# Patient Record
Sex: MALE | Race: BLACK OR AFRICAN AMERICAN | NOT HISPANIC OR LATINO
[De-identification: names, ages, dates, MRNs, and addresses within clinical notes are randomized per-mention and may not be internally consistent; named-entity substitution may affect disease eponyms.]

---

## 2017-09-05 ENCOUNTER — APPOINTMENT (OUTPATIENT)
Dept: CARDIOTHORACIC SURGERY | Facility: CLINIC | Age: 57
End: 2017-09-05

## 2017-09-05 DIAGNOSIS — J90 PLEURAL EFFUSION, NOT ELSEWHERE CLASSIFIED: ICD-10-CM

## 2017-09-19 ENCOUNTER — APPOINTMENT (OUTPATIENT)
Dept: CARDIOTHORACIC SURGERY | Facility: CLINIC | Age: 57
End: 2017-09-19

## 2017-09-19 ENCOUNTER — APPOINTMENT (OUTPATIENT)
Dept: CARDIOTHORACIC SURGERY | Facility: CLINIC | Age: 57
End: 2017-09-19
Payer: COMMERCIAL

## 2017-09-26 ENCOUNTER — APPOINTMENT (OUTPATIENT)
Dept: CARDIOTHORACIC SURGERY | Facility: CLINIC | Age: 57
End: 2017-09-26
Payer: COMMERCIAL

## 2017-10-02 ENCOUNTER — FORM ENCOUNTER (OUTPATIENT)
Age: 57
End: 2017-10-02

## 2017-10-03 ENCOUNTER — OUTPATIENT (OUTPATIENT)
Dept: OUTPATIENT SERVICES | Facility: HOSPITAL | Age: 57
LOS: 1 days | End: 2017-10-03
Payer: COMMERCIAL

## 2017-10-03 ENCOUNTER — APPOINTMENT (OUTPATIENT)
Dept: CARDIOTHORACIC SURGERY | Facility: CLINIC | Age: 57
End: 2017-10-03
Payer: COMMERCIAL

## 2017-10-03 VITALS
DIASTOLIC BLOOD PRESSURE: 84 MMHG | BODY MASS INDEX: 26.65 KG/M2 | RESPIRATION RATE: 19 BRPM | SYSTOLIC BLOOD PRESSURE: 147 MMHG | TEMPERATURE: 97.5 F | HEART RATE: 73 BPM | OXYGEN SATURATION: 99 % | WEIGHT: 170 LBS

## 2017-10-03 DIAGNOSIS — I34.9 NONRHEUMATIC MITRAL VALVE DISORDER, UNSPECIFIED: ICD-10-CM

## 2017-10-03 DIAGNOSIS — Z98.890 OTHER SPECIFIED POSTPROCEDURAL STATES: Chronic | ICD-10-CM

## 2017-10-03 PROCEDURE — 99214 OFFICE O/P EST MOD 30 MIN: CPT

## 2017-10-03 PROCEDURE — 93306 TTE W/DOPPLER COMPLETE: CPT | Mod: 26

## 2017-10-03 PROCEDURE — 93306 TTE W/DOPPLER COMPLETE: CPT

## 2017-10-03 RX ORDER — ATORVASTATIN CALCIUM 20 MG/1
20 TABLET, FILM COATED ORAL
Qty: 30 | Refills: 3 | Status: ACTIVE | COMMUNITY

## 2017-10-18 ENCOUNTER — NON-APPOINTMENT (OUTPATIENT)
Age: 57
End: 2017-10-18

## 2017-10-18 ENCOUNTER — APPOINTMENT (OUTPATIENT)
Dept: CARDIOLOGY | Facility: CLINIC | Age: 57
End: 2017-10-18
Payer: MEDICAID

## 2017-10-18 VITALS
OXYGEN SATURATION: 98 % | HEART RATE: 83 BPM | WEIGHT: 174 LBS | DIASTOLIC BLOOD PRESSURE: 60 MMHG | SYSTOLIC BLOOD PRESSURE: 120 MMHG | HEIGHT: 67 IN | BODY MASS INDEX: 27.31 KG/M2

## 2017-10-18 PROCEDURE — 93000 ELECTROCARDIOGRAM COMPLETE: CPT

## 2017-10-18 PROCEDURE — 99204 OFFICE O/P NEW MOD 45 MIN: CPT

## 2017-11-02 ENCOUNTER — APPOINTMENT (OUTPATIENT)
Dept: CARDIOLOGY | Facility: CLINIC | Age: 57
End: 2017-11-02
Payer: MEDICAID

## 2017-11-02 PROCEDURE — 93015 CV STRESS TEST SUPVJ I&R: CPT

## 2017-11-06 ENCOUNTER — APPOINTMENT (OUTPATIENT)
Dept: CARDIOLOGY | Facility: CLINIC | Age: 57
End: 2017-11-06
Payer: MEDICAID

## 2017-11-06 PROCEDURE — 93224 XTRNL ECG REC UP TO 48 HRS: CPT

## 2017-11-08 ENCOUNTER — NON-APPOINTMENT (OUTPATIENT)
Age: 57
End: 2017-11-08

## 2017-11-21 ENCOUNTER — APPOINTMENT (OUTPATIENT)
Dept: CARDIOLOGY | Facility: CLINIC | Age: 57
End: 2017-11-21
Payer: MEDICAID

## 2017-11-21 ENCOUNTER — NON-APPOINTMENT (OUTPATIENT)
Age: 57
End: 2017-11-21

## 2017-11-21 VITALS
BODY MASS INDEX: 26.84 KG/M2 | WEIGHT: 171 LBS | SYSTOLIC BLOOD PRESSURE: 120 MMHG | HEIGHT: 67 IN | DIASTOLIC BLOOD PRESSURE: 70 MMHG | HEART RATE: 79 BPM | OXYGEN SATURATION: 98 %

## 2017-11-21 PROCEDURE — 93000 ELECTROCARDIOGRAM COMPLETE: CPT

## 2017-11-21 PROCEDURE — 99214 OFFICE O/P EST MOD 30 MIN: CPT

## 2017-12-04 ENCOUNTER — INPATIENT (INPATIENT)
Facility: HOSPITAL | Age: 57
LOS: 0 days | Discharge: ANOTHER HOSPITAL | End: 2017-12-05
Attending: HOSPITALIST | Admitting: HOSPITALIST
Payer: COMMERCIAL

## 2017-12-04 VITALS
DIASTOLIC BLOOD PRESSURE: 91 MMHG | RESPIRATION RATE: 20 BRPM | HEART RATE: 118 BPM | HEIGHT: 67 IN | SYSTOLIC BLOOD PRESSURE: 109 MMHG | TEMPERATURE: 98 F | WEIGHT: 171.08 LBS | OXYGEN SATURATION: 99 %

## 2017-12-04 DIAGNOSIS — Z98.890 OTHER SPECIFIED POSTPROCEDURAL STATES: Chronic | ICD-10-CM

## 2017-12-04 LAB
BASOPHILS # BLD AUTO: 0.1 K/UL — SIGNIFICANT CHANGE UP (ref 0–0.2)
BASOPHILS NFR BLD AUTO: 1.8 % — SIGNIFICANT CHANGE UP (ref 0–2)
D DIMER BLD IA.RAPID-MCNC: 164 NG/ML DDU — SIGNIFICANT CHANGE UP
EOSINOPHIL # BLD AUTO: 0.2 K/UL — SIGNIFICANT CHANGE UP (ref 0–0.5)
EOSINOPHIL NFR BLD AUTO: 3.1 % — SIGNIFICANT CHANGE UP (ref 0–6)
HCT VFR BLD CALC: 40.6 % — SIGNIFICANT CHANGE UP (ref 39–50)
HGB BLD-MCNC: 13.3 G/DL — SIGNIFICANT CHANGE UP (ref 13–17)
LACTATE SERPL-SCNC: 0.7 MMOL/L — SIGNIFICANT CHANGE UP (ref 0.7–2)
LYMPHOCYTES # BLD AUTO: 2.5 K/UL — SIGNIFICANT CHANGE UP (ref 1–3.3)
LYMPHOCYTES # BLD AUTO: 45.8 % — HIGH (ref 13–44)
MCHC RBC-ENTMCNC: 27.8 PG — SIGNIFICANT CHANGE UP (ref 27–34)
MCHC RBC-ENTMCNC: 32.8 GM/DL — SIGNIFICANT CHANGE UP (ref 32–36)
MCV RBC AUTO: 84.9 FL — SIGNIFICANT CHANGE UP (ref 80–100)
MONOCYTES # BLD AUTO: 0.5 K/UL — SIGNIFICANT CHANGE UP (ref 0–0.9)
MONOCYTES NFR BLD AUTO: 9.7 % — SIGNIFICANT CHANGE UP (ref 2–14)
NEUTROPHILS # BLD AUTO: 2.1 K/UL — SIGNIFICANT CHANGE UP (ref 1.8–7.4)
NEUTROPHILS NFR BLD AUTO: 39.6 % — LOW (ref 43–77)
PLATELET # BLD AUTO: 201 K/UL — SIGNIFICANT CHANGE UP (ref 150–400)
RBC # BLD: 4.78 M/UL — SIGNIFICANT CHANGE UP (ref 4.2–5.8)
RBC # FLD: 13.2 % — SIGNIFICANT CHANGE UP (ref 11–15)
WBC # BLD: 5.4 K/UL — SIGNIFICANT CHANGE UP (ref 3.8–10.5)
WBC # FLD AUTO: 5.4 K/UL — SIGNIFICANT CHANGE UP (ref 3.8–10.5)

## 2017-12-04 PROCEDURE — 99285 EMERGENCY DEPT VISIT HI MDM: CPT | Mod: 25

## 2017-12-04 PROCEDURE — 71010: CPT | Mod: 26

## 2017-12-04 NOTE — ED ADULT NURSE NOTE - NSHISCREENINGQ1_ED_A_ED
Graft Donor Site Bandage (Optional-Leave Blank If You Don't Want In Note): Steri-strips and a pressure bandage were applied to the donor site. No

## 2017-12-04 NOTE — ED PROVIDER NOTE - OBJECTIVE STATEMENT
Pertinent PMH/PSH/FHx/SHx and Review of Systems contained within:  57M hx htn, valve repair mitral and tricuspid last year pw tachycardia and palpitations. this afternoon without exertion pt developed tachycardia. no cp, sob, nausea, vomiting. took an extra extended release metop 50. doesn't feel symptoms now  Fh and Sh not otherwise contributory  ROS otherwise negative

## 2017-12-04 NOTE — ED PROVIDER NOTE - PHYSICAL EXAMINATION
Gen: Alert, NAD  Head: NC, AT   Eyes: PERRL, EOMI, normal lids/conjunctiva  ENT: normal hearing, patent oropharynx without erythema/exudate, uvula midline  Neck: supple, no tenderness, Trachea midline  Pulm: Bilateral BS, normal resp effort, no wheeze/stridor/retractions  CV: tachycardia, no M/R/G, 2+ radial and dp pulses bl, no edema  Abd: soft, NT/ND, +BS, no hepatosplenomegaly  Mskel: extremities x4 with normal ROM and no joint effusions. no ctl spine ttp.   Skin: no rash, no bruising   Neuro: AAOx3, no sensory/motor deficits, CN 2-12 intact

## 2017-12-04 NOTE — ED PROVIDER NOTE - MEDICAL DECISION MAKING DETAILS
patient pw palpitations. patient pw palpitations. ekg shows sinus tach with twave inversions laterally. no abnormal intervals. suspect pe vs valve damage vs acs. will likely need admission for echo. patient pw palpitations. ekg shows sinus tach with twave inversions laterally. no abnormal intervals. suspect pe vs valve damage vs acs. will likely need admission for echo. trop elevated slightly, suspect type 2 event. will give asa and iv metop. will admit. consult blanco in the AM

## 2017-12-04 NOTE — ED ADULT NURSE NOTE - OBJECTIVE STATEMENT
Pt c/o feeling palpitations earlier today. Hx HTN, Cardiac (mitral & tricuspid) valve repair last year. Pt denies SOB, N/V, dizziness. Denies heavy lifting. Denies feeling palpitations at this time. -120 at rest. No distress noted.

## 2017-12-04 NOTE — ED PROVIDER NOTE - PMH
Atrial fibrillation    BPH (benign prostatic hyperplasia)    Hernia, femoral    Mitral insufficiency    Systolic CHF, acute on chronic

## 2017-12-05 ENCOUNTER — INPATIENT (INPATIENT)
Facility: HOSPITAL | Age: 57
LOS: 1 days | Discharge: HOME | DRG: 274 | End: 2017-12-07
Attending: INTERNAL MEDICINE | Admitting: INTERNAL MEDICINE
Payer: COMMERCIAL

## 2017-12-05 VITALS
TEMPERATURE: 98 F | HEART RATE: 105 BPM | OXYGEN SATURATION: 98 % | DIASTOLIC BLOOD PRESSURE: 69 MMHG | SYSTOLIC BLOOD PRESSURE: 111 MMHG | HEIGHT: 67 IN | WEIGHT: 167.55 LBS | RESPIRATION RATE: 18 BRPM

## 2017-12-05 VITALS
DIASTOLIC BLOOD PRESSURE: 85 MMHG | HEART RATE: 120 BPM | TEMPERATURE: 98 F | OXYGEN SATURATION: 96 % | RESPIRATION RATE: 18 BRPM | SYSTOLIC BLOOD PRESSURE: 126 MMHG

## 2017-12-05 DIAGNOSIS — Z98.890 OTHER SPECIFIED POSTPROCEDURAL STATES: Chronic | ICD-10-CM

## 2017-12-05 DIAGNOSIS — I49.9 CARDIAC ARRHYTHMIA, UNSPECIFIED: ICD-10-CM

## 2017-12-05 DIAGNOSIS — I34.0 NONRHEUMATIC MITRAL (VALVE) INSUFFICIENCY: ICD-10-CM

## 2017-12-05 DIAGNOSIS — N40.1 BENIGN PROSTATIC HYPERPLASIA WITH LOWER URINARY TRACT SYMPTOMS: ICD-10-CM

## 2017-12-05 DIAGNOSIS — R00.2 PALPITATIONS: ICD-10-CM

## 2017-12-05 DIAGNOSIS — R74.8 ABNORMAL LEVELS OF OTHER SERUM ENZYMES: ICD-10-CM

## 2017-12-05 LAB
ALBUMIN SERPL ELPH-MCNC: 3.8 G/DL — SIGNIFICANT CHANGE UP (ref 3.3–5)
ALP SERPL-CCNC: 69 U/L — SIGNIFICANT CHANGE UP (ref 40–120)
ALT FLD-CCNC: 26 U/L — SIGNIFICANT CHANGE UP (ref 12–78)
ANION GAP SERPL CALC-SCNC: 7 MMOL/L — SIGNIFICANT CHANGE UP (ref 5–17)
ANION GAP SERPL CALC-SCNC: 9 MMOL/L — SIGNIFICANT CHANGE UP (ref 5–17)
APPEARANCE UR: CLEAR — SIGNIFICANT CHANGE UP
APTT BLD: 146.2 SEC — CRITICAL HIGH (ref 27.5–37.4)
APTT BLD: 33.3 SEC — SIGNIFICANT CHANGE UP (ref 27.5–37.4)
APTT BLD: 57.3 SEC — HIGH (ref 27.5–37.4)
APTT BLD: > 200 SEC (ref 27.5–37.4)
AST SERPL-CCNC: 21 U/L — SIGNIFICANT CHANGE UP (ref 15–37)
BACTERIA # UR AUTO: ABNORMAL
BASOPHILS # BLD AUTO: 0.1 K/UL — SIGNIFICANT CHANGE UP (ref 0–0.2)
BASOPHILS NFR BLD AUTO: 1.5 % — SIGNIFICANT CHANGE UP (ref 0–2)
BILIRUB SERPL-MCNC: 1 MG/DL — SIGNIFICANT CHANGE UP (ref 0.2–1.2)
BILIRUB UR-MCNC: NEGATIVE — SIGNIFICANT CHANGE UP
BUN SERPL-MCNC: 17 MG/DL — SIGNIFICANT CHANGE UP (ref 7–23)
BUN SERPL-MCNC: 18 MG/DL — SIGNIFICANT CHANGE UP (ref 7–23)
CALCIUM SERPL-MCNC: 8.3 MG/DL — LOW (ref 8.5–10.1)
CALCIUM SERPL-MCNC: 8.7 MG/DL — SIGNIFICANT CHANGE UP (ref 8.5–10.1)
CHLORIDE SERPL-SCNC: 106 MMOL/L — SIGNIFICANT CHANGE UP (ref 96–108)
CHLORIDE SERPL-SCNC: 107 MMOL/L — SIGNIFICANT CHANGE UP (ref 96–108)
CK MB CFR SERPL CALC: 3.4 NG/ML — SIGNIFICANT CHANGE UP (ref 0.5–3.6)
CO2 SERPL-SCNC: 27 MMOL/L — SIGNIFICANT CHANGE UP (ref 22–31)
CO2 SERPL-SCNC: 28 MMOL/L — SIGNIFICANT CHANGE UP (ref 22–31)
COLOR SPEC: YELLOW — SIGNIFICANT CHANGE UP
CREAT SERPL-MCNC: 1.14 MG/DL — SIGNIFICANT CHANGE UP (ref 0.5–1.3)
CREAT SERPL-MCNC: 1.2 MG/DL — SIGNIFICANT CHANGE UP (ref 0.5–1.3)
DIFF PNL FLD: ABNORMAL
EOSINOPHIL # BLD AUTO: 0.3 K/UL — SIGNIFICANT CHANGE UP (ref 0–0.5)
EOSINOPHIL NFR BLD AUTO: 4.6 % — SIGNIFICANT CHANGE UP (ref 0–6)
GLUCOSE SERPL-MCNC: 97 MG/DL — SIGNIFICANT CHANGE UP (ref 70–99)
GLUCOSE SERPL-MCNC: 99 MG/DL — SIGNIFICANT CHANGE UP (ref 70–99)
GLUCOSE UR QL: NEGATIVE MG/DL — SIGNIFICANT CHANGE UP
HCT VFR BLD CALC: 38 % — LOW (ref 39–50)
HCT VFR BLD CALC: 40 % — SIGNIFICANT CHANGE UP (ref 39–50)
HGB BLD-MCNC: 12.3 G/DL — LOW (ref 13–17)
HGB BLD-MCNC: 13.2 G/DL — SIGNIFICANT CHANGE UP (ref 13–17)
INR BLD: 1.06 RATIO — SIGNIFICANT CHANGE UP (ref 0.88–1.16)
INR BLD: 1.2 RATIO — HIGH (ref 0.88–1.16)
KETONES UR-MCNC: NEGATIVE — SIGNIFICANT CHANGE UP
LEUKOCYTE ESTERASE UR-ACNC: NEGATIVE — SIGNIFICANT CHANGE UP
LYMPHOCYTES # BLD AUTO: 3.8 K/UL — HIGH (ref 1–3.3)
LYMPHOCYTES # BLD AUTO: 58.2 % — HIGH (ref 13–44)
MAGNESIUM SERPL-MCNC: 2.4 MG/DL — SIGNIFICANT CHANGE UP (ref 1.6–2.6)
MCHC RBC-ENTMCNC: 27.8 PG — SIGNIFICANT CHANGE UP (ref 27–34)
MCHC RBC-ENTMCNC: 28.3 PG — SIGNIFICANT CHANGE UP (ref 27–34)
MCHC RBC-ENTMCNC: 32.3 GM/DL — SIGNIFICANT CHANGE UP (ref 32–36)
MCHC RBC-ENTMCNC: 32.9 GM/DL — SIGNIFICANT CHANGE UP (ref 32–36)
MCV RBC AUTO: 85.9 FL — SIGNIFICANT CHANGE UP (ref 80–100)
MCV RBC AUTO: 86 FL — SIGNIFICANT CHANGE UP (ref 80–100)
MONOCYTES # BLD AUTO: 0.4 K/UL — SIGNIFICANT CHANGE UP (ref 0–0.9)
MONOCYTES NFR BLD AUTO: 5.9 % — SIGNIFICANT CHANGE UP (ref 2–14)
NEUTROPHILS # BLD AUTO: 2 K/UL — SIGNIFICANT CHANGE UP (ref 1.8–7.4)
NEUTROPHILS NFR BLD AUTO: 29.8 % — LOW (ref 43–77)
NITRITE UR-MCNC: NEGATIVE — SIGNIFICANT CHANGE UP
PH UR: 7 — SIGNIFICANT CHANGE UP (ref 5–8)
PLATELET # BLD AUTO: 194 K/UL — SIGNIFICANT CHANGE UP (ref 150–400)
PLATELET # BLD AUTO: 194 K/UL — SIGNIFICANT CHANGE UP (ref 150–400)
POTASSIUM SERPL-MCNC: 3.6 MMOL/L — SIGNIFICANT CHANGE UP (ref 3.5–5.3)
POTASSIUM SERPL-MCNC: 4.3 MMOL/L — SIGNIFICANT CHANGE UP (ref 3.5–5.3)
POTASSIUM SERPL-SCNC: 3.6 MMOL/L — SIGNIFICANT CHANGE UP (ref 3.5–5.3)
POTASSIUM SERPL-SCNC: 4.3 MMOL/L — SIGNIFICANT CHANGE UP (ref 3.5–5.3)
PROT SERPL-MCNC: 7.9 GM/DL — SIGNIFICANT CHANGE UP (ref 6–8.3)
PROT UR-MCNC: NEGATIVE MG/DL — SIGNIFICANT CHANGE UP
PROTHROM AB SERPL-ACNC: 11.6 SEC — SIGNIFICANT CHANGE UP (ref 9.8–12.7)
PROTHROM AB SERPL-ACNC: 13 SEC — HIGH (ref 9.8–12.7)
RBC # BLD: 4.42 M/UL — SIGNIFICANT CHANGE UP (ref 4.2–5.8)
RBC # BLD: 4.65 M/UL — SIGNIFICANT CHANGE UP (ref 4.2–5.8)
RBC # FLD: 13.7 % — SIGNIFICANT CHANGE UP (ref 11–15)
RBC # FLD: 13.8 % — SIGNIFICANT CHANGE UP (ref 11–15)
RBC CASTS # UR COMP ASSIST: ABNORMAL /HPF (ref 0–4)
SODIUM SERPL-SCNC: 142 MMOL/L — SIGNIFICANT CHANGE UP (ref 135–145)
SODIUM SERPL-SCNC: 142 MMOL/L — SIGNIFICANT CHANGE UP (ref 135–145)
SP GR SPEC: 1.01 — SIGNIFICANT CHANGE UP (ref 1.01–1.02)
TROPONIN I SERPL-MCNC: 0.03 NG/ML — SIGNIFICANT CHANGE UP (ref 0.01–0.04)
TROPONIN I SERPL-MCNC: 0.06 NG/ML — HIGH (ref 0.01–0.04)
TROPONIN I SERPL-MCNC: 0.07 NG/ML — HIGH (ref 0.01–0.04)
UROBILINOGEN FLD QL: NEGATIVE MG/DL — SIGNIFICANT CHANGE UP
WBC # BLD: 5.8 K/UL — SIGNIFICANT CHANGE UP (ref 3.8–10.5)
WBC # BLD: 6.5 K/UL — SIGNIFICANT CHANGE UP (ref 3.8–10.5)
WBC # FLD AUTO: 5.8 K/UL — SIGNIFICANT CHANGE UP (ref 3.8–10.5)
WBC # FLD AUTO: 6.5 K/UL — SIGNIFICANT CHANGE UP (ref 3.8–10.5)
WBC UR QL: SIGNIFICANT CHANGE UP

## 2017-12-05 PROCEDURE — 99223 1ST HOSP IP/OBS HIGH 75: CPT

## 2017-12-05 PROCEDURE — 93613 INTRACARDIAC EPHYS 3D MAPG: CPT

## 2017-12-05 PROCEDURE — 93656 COMPRE EP EVAL ABLTJ ATR FIB: CPT

## 2017-12-05 PROCEDURE — 93010 ELECTROCARDIOGRAM REPORT: CPT | Mod: 76

## 2017-12-05 PROCEDURE — 93655 ICAR CATH ABLTJ DSCRT ARRHYT: CPT

## 2017-12-05 PROCEDURE — 93662 INTRACARDIAC ECG (ICE): CPT | Mod: 26

## 2017-12-05 PROCEDURE — 93010 ELECTROCARDIOGRAM REPORT: CPT

## 2017-12-05 PROCEDURE — 12345: CPT | Mod: NC

## 2017-12-05 RX ORDER — ASPIRIN/CALCIUM CARB/MAGNESIUM 324 MG
81 TABLET ORAL DAILY
Qty: 0 | Refills: 0 | Status: DISCONTINUED | OUTPATIENT
Start: 2017-12-05 | End: 2017-12-07

## 2017-12-05 RX ORDER — TAMSULOSIN HYDROCHLORIDE 0.4 MG/1
0.4 CAPSULE ORAL AT BEDTIME
Qty: 0 | Refills: 0 | Status: DISCONTINUED | OUTPATIENT
Start: 2017-12-05 | End: 2017-12-05

## 2017-12-05 RX ORDER — METOPROLOL TARTRATE 50 MG
25 TABLET ORAL
Qty: 0 | Refills: 0 | Status: DISCONTINUED | OUTPATIENT
Start: 2017-12-05 | End: 2017-12-05

## 2017-12-05 RX ORDER — ACETAMINOPHEN 500 MG
650 TABLET ORAL EVERY 6 HOURS
Qty: 0 | Refills: 0 | Status: DISCONTINUED | OUTPATIENT
Start: 2017-12-05 | End: 2017-12-07

## 2017-12-05 RX ORDER — ASPIRIN/CALCIUM CARB/MAGNESIUM 324 MG
81 TABLET ORAL DAILY
Qty: 0 | Refills: 0 | Status: DISCONTINUED | OUTPATIENT
Start: 2017-12-06 | End: 2017-12-05

## 2017-12-05 RX ORDER — HEPARIN SODIUM 5000 [USP'U]/ML
0 INJECTION INTRAVENOUS; SUBCUTANEOUS
Qty: 0 | Refills: 0 | COMMUNITY
Start: 2017-12-05

## 2017-12-05 RX ORDER — HEPARIN SODIUM 5000 [USP'U]/ML
6500 INJECTION INTRAVENOUS; SUBCUTANEOUS EVERY 6 HOURS
Qty: 0 | Refills: 0 | Status: DISCONTINUED | OUTPATIENT
Start: 2017-12-05 | End: 2017-12-06

## 2017-12-05 RX ORDER — TAMSULOSIN HYDROCHLORIDE 0.4 MG/1
1 CAPSULE ORAL
Qty: 0 | Refills: 0 | DISCHARGE
Start: 2017-12-05

## 2017-12-05 RX ORDER — ATORVASTATIN CALCIUM 80 MG/1
20 TABLET, FILM COATED ORAL AT BEDTIME
Qty: 0 | Refills: 0 | Status: DISCONTINUED | OUTPATIENT
Start: 2017-12-05 | End: 2017-12-05

## 2017-12-05 RX ORDER — ASPIRIN/CALCIUM CARB/MAGNESIUM 324 MG
1 TABLET ORAL
Qty: 0 | Refills: 0 | COMMUNITY
Start: 2017-12-05

## 2017-12-05 RX ORDER — PANTOPRAZOLE SODIUM 20 MG/1
40 TABLET, DELAYED RELEASE ORAL
Qty: 0 | Refills: 0 | Status: DISCONTINUED | OUTPATIENT
Start: 2017-12-05 | End: 2017-12-07

## 2017-12-05 RX ORDER — AMLODIPINE BESYLATE 2.5 MG/1
1 TABLET ORAL
Qty: 0 | Refills: 0 | COMMUNITY

## 2017-12-05 RX ORDER — HEPARIN SODIUM 5000 [USP'U]/ML
1200 INJECTION INTRAVENOUS; SUBCUTANEOUS
Qty: 25000 | Refills: 0 | Status: DISCONTINUED | OUTPATIENT
Start: 2017-12-05 | End: 2017-12-05

## 2017-12-05 RX ORDER — HEPARIN SODIUM 5000 [USP'U]/ML
6500 INJECTION INTRAVENOUS; SUBCUTANEOUS EVERY 6 HOURS
Qty: 0 | Refills: 0 | Status: DISCONTINUED | OUTPATIENT
Start: 2017-12-05 | End: 2017-12-05

## 2017-12-05 RX ORDER — METOPROLOL TARTRATE 50 MG
25 TABLET ORAL EVERY 12 HOURS
Qty: 0 | Refills: 0 | Status: DISCONTINUED | OUTPATIENT
Start: 2017-12-05 | End: 2017-12-07

## 2017-12-05 RX ORDER — TAMSULOSIN HYDROCHLORIDE 0.4 MG/1
0.4 CAPSULE ORAL AT BEDTIME
Qty: 0 | Refills: 0 | Status: DISCONTINUED | OUTPATIENT
Start: 2017-12-05 | End: 2017-12-07

## 2017-12-05 RX ORDER — HEPARIN SODIUM 5000 [USP'U]/ML
900 INJECTION INTRAVENOUS; SUBCUTANEOUS
Qty: 25000 | Refills: 0 | Status: DISCONTINUED | OUTPATIENT
Start: 2017-12-05 | End: 2017-12-06

## 2017-12-05 RX ORDER — ASPIRIN/CALCIUM CARB/MAGNESIUM 324 MG
325 TABLET ORAL ONCE
Qty: 0 | Refills: 0 | Status: COMPLETED | OUTPATIENT
Start: 2017-12-05 | End: 2017-12-05

## 2017-12-05 RX ORDER — INFLUENZA VIRUS VACCINE 15; 15; 15; 15 UG/.5ML; UG/.5ML; UG/.5ML; UG/.5ML
0.5 SUSPENSION INTRAMUSCULAR ONCE
Qty: 0 | Refills: 0 | Status: DISCONTINUED | OUTPATIENT
Start: 2017-12-05 | End: 2017-12-05

## 2017-12-05 RX ORDER — METOPROLOL TARTRATE 50 MG
1 TABLET ORAL
Qty: 0 | Refills: 0 | COMMUNITY
Start: 2017-12-05

## 2017-12-05 RX ORDER — SODIUM CHLORIDE 9 MG/ML
1000 INJECTION INTRAMUSCULAR; INTRAVENOUS; SUBCUTANEOUS ONCE
Qty: 0 | Refills: 0 | Status: COMPLETED | OUTPATIENT
Start: 2017-12-05 | End: 2017-12-05

## 2017-12-05 RX ORDER — HEPARIN SODIUM 5000 [USP'U]/ML
6500 INJECTION INTRAVENOUS; SUBCUTANEOUS ONCE
Qty: 0 | Refills: 0 | Status: COMPLETED | OUTPATIENT
Start: 2017-12-05 | End: 2017-12-05

## 2017-12-05 RX ORDER — ATORVASTATIN CALCIUM 80 MG/1
20 TABLET, FILM COATED ORAL AT BEDTIME
Qty: 0 | Refills: 0 | Status: DISCONTINUED | OUTPATIENT
Start: 2017-12-05 | End: 2017-12-07

## 2017-12-05 RX ORDER — METOPROLOL TARTRATE 50 MG
5 TABLET ORAL ONCE
Qty: 0 | Refills: 0 | Status: COMPLETED | OUTPATIENT
Start: 2017-12-05 | End: 2017-12-05

## 2017-12-05 RX ORDER — HEPARIN SODIUM 5000 [USP'U]/ML
3000 INJECTION INTRAVENOUS; SUBCUTANEOUS EVERY 6 HOURS
Qty: 0 | Refills: 0 | Status: DISCONTINUED | OUTPATIENT
Start: 2017-12-05 | End: 2017-12-06

## 2017-12-05 RX ORDER — INFLUENZA VIRUS VACCINE 15; 15; 15; 15 UG/.5ML; UG/.5ML; UG/.5ML; UG/.5ML
0 SUSPENSION INTRAMUSCULAR
Qty: 0 | Refills: 0 | COMMUNITY
Start: 2017-12-05

## 2017-12-05 RX ORDER — HEPARIN SODIUM 5000 [USP'U]/ML
INJECTION INTRAVENOUS; SUBCUTANEOUS
Qty: 25000 | Refills: 0 | Status: DISCONTINUED | OUTPATIENT
Start: 2017-12-05 | End: 2017-12-05

## 2017-12-05 RX ORDER — ATORVASTATIN CALCIUM 80 MG/1
1 TABLET, FILM COATED ORAL
Qty: 0 | Refills: 0 | DISCHARGE
Start: 2017-12-05

## 2017-12-05 RX ORDER — HEPARIN SODIUM 5000 [USP'U]/ML
12 INJECTION INTRAVENOUS; SUBCUTANEOUS
Qty: 0 | Refills: 0 | COMMUNITY
Start: 2017-12-05

## 2017-12-05 RX ORDER — FINASTERIDE 5 MG/1
5 TABLET, FILM COATED ORAL DAILY
Qty: 0 | Refills: 0 | Status: DISCONTINUED | OUTPATIENT
Start: 2017-12-05 | End: 2017-12-07

## 2017-12-05 RX ORDER — INFLUENZA VIRUS VACCINE 15; 15; 15; 15 UG/.5ML; UG/.5ML; UG/.5ML; UG/.5ML
0.5 SUSPENSION INTRAMUSCULAR ONCE
Qty: 0 | Refills: 0 | Status: COMPLETED | OUTPATIENT
Start: 2017-12-05 | End: 2017-12-07

## 2017-12-05 RX ORDER — METOPROLOL TARTRATE 50 MG
1 TABLET ORAL
Qty: 0 | Refills: 0 | COMMUNITY

## 2017-12-05 RX ORDER — HEPARIN SODIUM 5000 [USP'U]/ML
3000 INJECTION INTRAVENOUS; SUBCUTANEOUS EVERY 6 HOURS
Qty: 0 | Refills: 0 | Status: DISCONTINUED | OUTPATIENT
Start: 2017-12-05 | End: 2017-12-05

## 2017-12-05 RX ORDER — PANTOPRAZOLE SODIUM 20 MG/1
40 TABLET, DELAYED RELEASE ORAL
Qty: 0 | Refills: 0 | Status: DISCONTINUED | OUTPATIENT
Start: 2017-12-05 | End: 2017-12-05

## 2017-12-05 RX ORDER — PANTOPRAZOLE SODIUM 20 MG/1
1 TABLET, DELAYED RELEASE ORAL
Qty: 0 | Refills: 0 | DISCHARGE
Start: 2017-12-05

## 2017-12-05 RX ADMIN — TAMSULOSIN HYDROCHLORIDE 0.4 MILLIGRAM(S): 0.4 CAPSULE ORAL at 21:27

## 2017-12-05 RX ADMIN — HEPARIN SODIUM 1200 UNIT(S)/HR: 5000 INJECTION INTRAVENOUS; SUBCUTANEOUS at 07:43

## 2017-12-05 RX ADMIN — PANTOPRAZOLE SODIUM 40 MILLIGRAM(S): 20 TABLET, DELAYED RELEASE ORAL at 06:14

## 2017-12-05 RX ADMIN — HEPARIN SODIUM 0 UNIT(S)/HR: 5000 INJECTION INTRAVENOUS; SUBCUTANEOUS at 14:32

## 2017-12-05 RX ADMIN — HEPARIN SODIUM 900 UNIT(S)/HR: 5000 INJECTION INTRAVENOUS; SUBCUTANEOUS at 15:36

## 2017-12-05 RX ADMIN — Medication 325 MILLIGRAM(S): at 00:58

## 2017-12-05 RX ADMIN — HEPARIN SODIUM 3000 UNIT(S): 5000 INJECTION INTRAVENOUS; SUBCUTANEOUS at 21:38

## 2017-12-05 RX ADMIN — HEPARIN SODIUM 6500 UNIT(S): 5000 INJECTION INTRAVENOUS; SUBCUTANEOUS at 02:41

## 2017-12-05 RX ADMIN — ATORVASTATIN CALCIUM 20 MILLIGRAM(S): 80 TABLET, FILM COATED ORAL at 21:27

## 2017-12-05 RX ADMIN — HEPARIN SODIUM 900 UNIT(S)/HR: 5000 INJECTION INTRAVENOUS; SUBCUTANEOUS at 21:33

## 2017-12-05 RX ADMIN — SODIUM CHLORIDE 1000 MILLILITER(S): 9 INJECTION INTRAMUSCULAR; INTRAVENOUS; SUBCUTANEOUS at 00:57

## 2017-12-05 RX ADMIN — Medication 25 MILLIGRAM(S): at 05:36

## 2017-12-05 RX ADMIN — Medication 5 MILLIGRAM(S): at 00:59

## 2017-12-05 RX ADMIN — HEPARIN SODIUM 1400 UNIT(S)/HR: 5000 INJECTION INTRAVENOUS; SUBCUTANEOUS at 02:41

## 2017-12-05 RX ADMIN — Medication 25 MILLIGRAM(S): at 17:08

## 2017-12-05 NOTE — DISCHARGE NOTE ADULT - CARE PROVIDER_API CALL
Bridger Floyd), Cardiovascular Disease; Internal Medicine; Nuclear Cardiology  [address] [place] [address]  Phone: [phone]  Fax: [phone]

## 2017-12-05 NOTE — H&P ADULT - ASSESSMENT
56 y/o man with PMH atrial fibrillation, recent history of mitral and tricuspid valve repair, presents with palpitations, but no CP, SOB or dizziness. He has mild elevation of troponin.  Pt will be admitted to telemetry, on beta blockers and anticoagulation.

## 2017-12-05 NOTE — H&P CARDIOLOGY - HISTORY OF PRESENT ILLNESS
56 y/o Man PMH htn, valve repair of mitral and tricuspid last year on coumadin, admitted to [facility] for tachycardia and palpitations, and found to be Aflutter and treated with BB an extra extended release metoprolol 50 mg, which relieved symptoms and started Heparin drip. Now is transferred to [facility] for Aflutter ablation by Dr. Holguin A

## 2017-12-05 NOTE — H&P ADULT - NSHPREVIEWOFSYSTEMS_GEN_ALL_CORE
REVIEW OF SYSTEMS:  CONSTITUTIONAL: No fever, weight loss, or fatigue  EYES: No eye pain, visual disturbances, or discharge  ENMT:  No difficulty hearing, tinnitus, vertigo; No sinus or throat pain  NECK: No pain or stiffness  RESPIRATORY: No cough, wheezing, chills or hemoptysis; No shortness of breath  CARDIOVASCULAR: No chest pain, +palpitations, no dizziness, or leg swelling  GASTROINTESTINAL: No abdominal or epigastric pain. No nausea, vomiting, or hematemesis; No diarrhea or constipation. No melena or hematochezia.  GENITOURINARY: No dysuria, frequency, hematuria, or incontinence  NEUROLOGICAL: No headaches, memory loss, loss of strength, numbness, or tremors  SKIN: No itching, burning, rashes, or lesions   LYMPH NODES: No enlarged glands  ENDOCRINE: No heat or cold intolerance; No hair loss  MUSCULOSKELETAL: No joint pain or swelling; No muscle, back, or extremity pain  PSYCHIATRIC: No depression, anxiety, mood swings, or difficulty sleeping  HEME/LYMPH: No easy bruising, or bleeding gums  ALLERGY AND IMMUNOLOGIC: No hives or eczema

## 2017-12-05 NOTE — DISCHARGE NOTE ADULT - CARE PLAN
Principal Discharge DX:	Atrial fibrillation  Goal:	transfer for EPS  Instructions for follow-up, activity and diet:	c/w meds

## 2017-12-05 NOTE — CONSULT NOTE ADULT - ASSESSMENT
56 y/o Man PMH HTN, chronic systolic heart failure with LVEF 35-40%, MV and TV repair with UTE clip and MAZE 2016;    Admitted overnight with tachycardia and palpitations; found to be in aflutter.  Currently rate-controlled.  HD stable.    Transfer to NS/EPS for evaluation for ablation.  -cont heparin gtt and metoprolol

## 2017-12-05 NOTE — CONSULT NOTE ADULT - SUBJECTIVE AND OBJECTIVE BOX
CARDIOLOGY CONSULT NOTE    Patient is a 57y Male with a known history of :  Mitral valve insufficiency, unspecified etiology (I34.0)  Benign prostatic hyperplasia with lower urinary tract symptoms, symptom details unspecified (N40.1)  Troponin level elevated (R74.8)  Palpitations (R00.2)    HPI:  58 y/o Man PMH HTN, chronic systolic heart failure with LVEF 35-40%, KEVIN 2016 with EF 40%, with chordal rupture, moderate flail motion of posterior MV leaflet with severe MV regurgitation, and TR... s/p minimally invasive MV repair (#32 ring), TV repair (#28 ring), UTE ligation with atriclip, and extensive left sided MAZE.  Cardiac cath at that time with no evidence of CAD.      Admitted overnight with tachycardia and palpitations; denied CP, SOB, dizziness, nausea or diaphoresis. Symptoms began yesterday afternoon at rest, no recent travel or leg swelling. Pt  took an extra extended release metoprolol 50 mg, which relieved symptoms.  Currently resting comfortably in bed.        REVIEW OF SYSTEMS:    CONSTITUTIONAL: No fever, weight loss, or fatigue  EYES: No eye pain, visual disturbances, or discharge  ENMT:  No difficulty hearing, tinnitus, vertigo; No sinus or throat pain  NECK: No pain or stiffness  BREASTS: No pain, masses, or nipple discharge  RESPIRATORY: No cough, wheezing, chills or hemoptysis; No shortness of breath  CARDIOVASCULAR: No chest pain, or leg swelling; +palpitations  GASTROINTESTINAL: No abdominal or epigastric pain. No nausea, vomiting, or hematemesis; No diarrhea or constipation. No melena or hematochezia.  GENITOURINARY: No dysuria, frequency, hematuria, or incontinence  NEUROLOGICAL: No headaches, memory loss, loss of strength, numbness, or tremors  SKIN: No itching, burning, rashes, or lesions   LYMPH NODES: No enlarged glands  ENDOCRINE: No heat or cold intolerance; No hair loss  MUSCULOSKELETAL: No joint pain or swelling; No muscle, back, or extremity pain  PSYCHIATRIC: No depression, anxiety, mood swings, or difficulty sleeping  HEME/LYMPH: No easy bruising, or bleeding gums  ALLERGY AND IMMUNOLOGIC: No hives or eczema    MEDICATIONS  (STANDING):  atorvastatin 20 milliGRAM(s) Oral at bedtime  heparin  Infusion. 1200 Unit(s)/Hr (12 mL/Hr) IV Continuous <Continuous>  influenza   Vaccine 0.5 milliLiter(s) IntraMuscular once  metoprolol     tartrate 25 milliGRAM(s) Oral two times a day  pantoprazole    Tablet 40 milliGRAM(s) Oral before breakfast  tamsulosin 0.4 milliGRAM(s) Oral at bedtime    MEDICATIONS  (PRN):  heparin  Injectable 6500 Unit(s) IV Push every 6 hours PRN For aPTT less than 40  heparin  Injectable 3000 Unit(s) IV Push every 6 hours PRN For aPTT between 40 - 57      ALLERGIES: Allergy Status Unknown      FAMILY HISTORY:  No pertinent family history in first degree relatives      PHYSICAL EXAMINATION:  -----------------------------  T(C): 36.6 (12-05-17 @ 12:38), Max: 37 (12-05-17 @ 05:33)  HR: 89 (12-05-17 @ 12:38) (65 - 118)  BP: 106/71 (12-05-17 @ 12:38) (106/71 - 120/78)  RR: 18 (12-05-17 @ 12:38) (16 - 20)  SpO2: 99% (12-05-17 @ 12:38) (99% - 100%)  Wt(kg): --    12-05 @ 07:01  -  12-05 @ 12:58  --------------------------------------------------------  IN:  Total IN: 0 mL    OUT:    Voided: 300 mL  Total OUT: 300 mL    Total NET: -300 mL        Height (cm): 170.18 (12-05 @ 05:33)  Weight (kg): 78.5 (12-05 @ 05:33)  BMI (kg/m2): 27.1 (12-05 @ 05:33)  BSA (m2): 1.9 (12-05 @ 05:33)    Constitutional: well developed, normal appearance, well groomed, well nourished, no deformities and no acute distress.   Eyes: the conjunctiva exhibited no abnormalities and the eyelids demonstrated no xanthelasmas.   HEENT: normal oral mucosa, no oral pallor and no oral cyanosis.   Neck: normal jugular venous A waves present, normal jugular venous V waves present and no jugular venous horne A waves.   Pulmonary: no respiratory distress, normal respiratory rhythm and effort, no accessory muscle use and lungs were clear to auscultation bilaterally.   Cardiovascular: tachy irregular  Abdomen: soft, non-tender, no hepato-splenomegaly and no abdominal mass palpated.   Musculoskeletal: the gait could not be assessed..   Extremities: no clubbing of the fingernails, no localized cyanosis, no petechial hemorrhages and no ischemic changes.   Skin: normal skin color and pigmentation, no rash, no venous stasis, no skin lesions, no skin ulcer and no xanthoma was observed.   Psychiatric: oriented to person, place, and time, the affect was normal, the mood was normal and not feeling anxious.     LABS:   --------  12-05    142  |  107  |  18  ----------------------------<  97  4.3   |  28  |  1.14    Ca    8.3<L>      05 Dec 2017 05:05  Mg     2.4     12-04    TPro  7.9  /  Alb  3.8  /  TBili  1.0  /  DBili  x   /  AST  21  /  ALT  26  /  AlkPhos  69  12-04                         13.2   5.8   )-----------( 194      ( 05 Dec 2017 10:14 )             40.0     PT/INR - ( 05 Dec 2017 03:03 )   PT: 11.6 sec;   INR: 1.06 ratio         PTT - ( 05 Dec 2017 06:24 )  PTT:> 200 sec    12-05 @ 10:14 CPK total:--, CKMB --, Troponin I - .029 ng/mL  12-05 @ 05:05 CPK total:--, CKMB --, Troponin I - .057 ng/mL<H>  12-04 @ 23:31 CPK total:--, CKMB --, Troponin I - .074 ng/mL<H>

## 2017-12-05 NOTE — H&P ADULT - NSHPLABSRESULTS_GEN_ALL_CORE
LABS:                        13.3   5.4   )-----------( 201      ( 04 Dec 2017 23:31 )             40.6     12-04    142  |  106  |  17  ----------------------------<  99  3.6   |  27  |  1.20    Ca    8.7      04 Dec 2017 23:31  Mg     2.4     12-04    TPro  7.9  /  Alb  3.8  /  TBili  1.0  /  DBili  x   /  AST  21  /  ALT  26  /  AlkPhos  69  12-04  Troponin I, Serum: .074:  ng/mL (17 @ 23:31)      Urinalysis Basic - ( 05 Dec 2017 00:06 )    Color: Yellow / Appearance: Clear / S.010 / pH: x  Gluc: x / Ketone: Negative  / Bili: Negative / Urobili: Negative mg/dL   Blood: x / Protein: Negative mg/dL / Nitrite: Negative   Leuk Esterase: Negative / RBC: 6-10 /HPF / WBC 0-2   Sq Epi: x / Non Sq Epi: x / Bacteria: Occasional      CAPILLARY BLOOD GLUCOSE          RADIOLOGY & ADDITIONAL TESTS:  CXR: enlarged heart, no infiltrate or PVC  Imaging Personally Reviewed:  [x ] YES  [ ] NO  EKG: supraventricular tachycardia at 119, LVH

## 2017-12-05 NOTE — CONSULT NOTE ADULT - SUBJECTIVE AND OBJECTIVE BOX
Date of Admission: 12/5/17     Patient is a 57y old  Male who presents with a chief complaint of palpitations	    HISTORY OF PRESENT ILLNESS:     58 yo gentleman w/PMHx HTN, chronic systolic heart failure with LVEF 35-40%, MV leaflet with severe MV regurgitation, and TR s/p minimally invasive MV/TV repair and UTE ligation with atriclip, and extensive left sided MAZE presented to [facility] w/palpitationsfound to have evidence of atach transferred for EP evaluation and possible atrial ablation. He presented with palpitations/heart racing at rest without any associated CP, SOB, lightheadedness or nausea/vomiting. He checked his pulse at that time and thought he was tachycardic. At that time he took an additional Toprol 50mg with symptom improvement. He currently feels well at rest and is in good spirits denying any complaints at this time.     Allergies: NKDA     MEDICATIONS:    Home Meds:   Aspirin 81mg qd  Toprol 50mg qd   Finasteride 5mg qd   Atorvastatin 20mg qd  Amlodipine 5mg qd     aspirin enteric coated 81 milliGRAM(s) Oral daily  heparin  Infusion. 900 Unit(s)/Hr IV Continuous <Continuous>  heparin  Injectable 6500 Unit(s) IV Push every 6 hours PRN  heparin  Injectable 3000 Unit(s) IV Push every 6 hours PRN  metoprolol     tartrate 25 milliGRAM(s) Oral every 12 hours  tamsulosin 0.4 milliGRAM(s) Oral at bedtime        acetaminophen   Tablet. 650 milliGRAM(s) Oral every 6 hours PRN    pantoprazole    Tablet 40 milliGRAM(s) Oral before breakfast    atorvastatin 20 milliGRAM(s) Oral at bedtime  finasteride 5 milliGRAM(s) Oral daily    influenza   Vaccine 0.5 milliLiter(s) IntraMuscular once      PAST MEDICAL & SURGICAL HISTORY:  Hernia, femoral  Atrial fibrillation  BPH (benign prostatic hyperplasia)  Mitral insufficiency  Systolic CHF, acute on chronic  H/O tricuspid valve repair  H/O mitral valve repair      FAMILY HISTORY:  HTN--Mother and Father      SOCIAL HISTORY:  Nonsmoker; Denies alcohol use       REVIEW OF SYSTEMS:    CONSTITUTIONAL: No weakness, fevers or chills  EYES/ENT: No visual changes;  No dysphagia  NECK: No pain or stiffness  RESPIRATORY: No cough, wheezing, hemoptysis; No shortness of breath  CARDIOVASCULAR: No chest pain, No lower extremity edema  GASTROINTESTINAL: No abdominal or epigastric pain. No nausea, vomiting, or hematemesis; No diarrhea or constipation. No melena or hematochezia.  BACK: No back pain  GENITOURINARY: No dysuria, frequency or hematuria  NEUROLOGICAL: No numbness or weakness  SKIN: No itching, burning, rashes, or lesions   All other review of systems is negative unless indicated above.      PHYSICAL EXAM:  T(C): 36.4 (12-05-17 @ 19:59), Max: 37 (12-05-17 @ 05:33)  HR: 105 (12-05-17 @ 19:59) (65 - 120)  BP: 111/69 (12-05-17 @ 19:59) (106/71 - 126/85)  RR: 18 (12-05-17 @ 19:59) (16 - 20)  SpO2: 98% (12-05-17 @ 19:59) (96% - 100%)  Wt(kg): --  I&O's Summary    05 Dec 2017 07:01  -  05 Dec 2017 21:09  --------------------------------------------------------  IN: 120 mL / OUT: 300 mL / NET: -180 mL        Appearance: Well appearing, pleasant, in no distress   HEENT:   Normal oral mucosa, PERRL, EOMI	  Lymphatic: No lymphadenopathy  Cardiovascular:  Normal S1 S2, No JVD, No murmurs, No edema  Respiratory: Lungs clear to auscultation	  Psychiatry: A & O x 3, Mood & affect appropriate  Gastrointestinal:  Soft, Non-tender, + BS	  Skin: No rashes, No ecchymoses, No cyanosis	  Neurologic: Non-focal  Extremities: Normal range of motion, No clubbing, cyanosis or edema  Vascular: Peripheral pulses palpable 2+ bilaterally        LABS:	 	    CBC Full  -  ( 05 Dec 2017 10:14 )  WBC Count : 5.8 K/uL  Hemoglobin : 13.2 g/dL  Hematocrit : 40.0 %  Platelet Count - Automated : 194 K/uL  Mean Cell Volume : 85.9 fl  Mean Cell Hemoglobin : 28.3 pg  Mean Cell Hemoglobin Concentration : 32.9 gm/dL  Auto Neutrophil # : x  Auto Lymphocyte # : x  Auto Monocyte # : x  Auto Eosinophil # : x  Auto Basophil # : x  Auto Neutrophil % : x  Auto Lymphocyte % : x  Auto Monocyte % : x  Auto Eosinophil % : x  Auto Basophil % : x    12-05    142  |  107  |  18  ----------------------------<  97  4.3   |  28  |  1.14  12-04    142  |  106  |  17  ----------------------------<  99  3.6   |  27  |  1.20    Ca    8.3<L>      05 Dec 2017 05:05  Ca    8.7      04 Dec 2017 23:31  Mg     2.4     12-04    TPro  7.9  /  Alb  3.8  /  TBili  1.0  /  DBili  x   /  AST  21  /  ALT  26  /  AlkPhos  69  12-04    CARDIAC MARKERS:  Troponin I, Serum: .029 ng/mL (12-05 @ 10:14)  Troponin I, Serum: .057 ng/mL (12-05 @ 05:05)  Troponin I, Serum: .074 ng/mL (12-04 @ 23:31)     	    ECG:  	    RADIOLOGY:      PREVIOUS DIAGNOSTIC TESTING:    [ ] Echocardiogram:  [ ]  Catheterization:  [ ] Stress Test:  	  	  ASSESSMENT/PLAN: Date of Admission: 12/5/17     Patient is a 57y old  Male who presents with a chief complaint of palpitations	    HISTORY OF PRESENT ILLNESS:     58 yo gentleman w/PMHx HTN, chronic systolic heart failure with LVEF 35-40%, MV leaflet with severe MV regurgitation, and TR s/p minimally invasive MV/TV repair and UTE ligation with atriclip, and extensive left sided MAZE presented to [facility] w/palpitationsfound to have evidence of atach transferred for EP evaluation and possible atrial ablation. He presented with palpitations/heart racing at rest without any associated CP, SOB, lightheadedness or nausea/vomiting. He checked his pulse at that time and thought he was tachycardic. At that time he took an additional Toprol 50mg with symptom improvement. He currently feels well at rest and is in good spirits denying any complaints at this time. No recent medication changes, fevers/chills, recent travel or sick contacts.     Allergies: NKDA     MEDICATIONS:    Home Meds:   Aspirin 81mg qd  Toprol 50mg qd   Finasteride 5mg qd   Atorvastatin 20mg qd  Amlodipine 5mg qd     aspirin enteric coated 81 milliGRAM(s) Oral daily  heparin  Infusion. 900 Unit(s)/Hr IV Continuous <Continuous>  heparin  Injectable 6500 Unit(s) IV Push every 6 hours PRN  heparin  Injectable 3000 Unit(s) IV Push every 6 hours PRN  metoprolol     tartrate 25 milliGRAM(s) Oral every 12 hours  tamsulosin 0.4 milliGRAM(s) Oral at bedtime        acetaminophen   Tablet. 650 milliGRAM(s) Oral every 6 hours PRN    pantoprazole    Tablet 40 milliGRAM(s) Oral before breakfast    atorvastatin 20 milliGRAM(s) Oral at bedtime  finasteride 5 milliGRAM(s) Oral daily    influenza   Vaccine 0.5 milliLiter(s) IntraMuscular once      PAST MEDICAL & SURGICAL HISTORY:  Hernia, femoral  Atrial fibrillation  BPH (benign prostatic hyperplasia)  Mitral insufficiency  Systolic CHF, acute on chronic  H/O tricuspid valve repair  H/O mitral valve repair      FAMILY HISTORY:  HTN--Mother and Father      SOCIAL HISTORY:  Nonsmoker; Denies alcohol use       REVIEW OF SYSTEMS:    CONSTITUTIONAL: No weakness, fevers or chills  EYES/ENT: No visual changes;  No dysphagia  NECK: No pain or stiffness  RESPIRATORY: No cough, wheezing, hemoptysis; No shortness of breath  CARDIOVASCULAR: No chest pain, No lower extremity edema  GASTROINTESTINAL: No abdominal or epigastric pain. No nausea, vomiting, or hematemesis; No diarrhea or constipation. No melena or hematochezia.  BACK: No back pain  GENITOURINARY: No dysuria, frequency or hematuria  NEUROLOGICAL: No numbness or weakness  SKIN: No itching, burning, rashes, or lesions   All other review of systems is negative unless indicated above.      PHYSICAL EXAM:  T(C): 36.4 (12-05-17 @ 19:59), Max: 37 (12-05-17 @ 05:33)  HR: 105 (12-05-17 @ 19:59) (65 - 120)  BP: 111/69 (12-05-17 @ 19:59) (106/71 - 126/85)  RR: 18 (12-05-17 @ 19:59) (16 - 20)  SpO2: 98% (12-05-17 @ 19:59) (96% - 100%)  Wt(kg): --  I&O's Summary    05 Dec 2017 07:01  -  05 Dec 2017 21:09  --------------------------------------------------------  IN: 120 mL / OUT: 300 mL / NET: -180 mL        Appearance: Well appearing, pleasant, in no distress   HEENT:   Normal oral mucosa, PERRL, EOMI	  Lymphatic: No lymphadenopathy  Cardiovascular:  Normal S1 S2, No JVD, No murmurs, No edema  Respiratory: Lungs clear to auscultation	  Psychiatry: A & O x 3, Mood & affect appropriate  Gastrointestinal:  Soft, Non-tender, + BS	  Skin: No rashes, No ecchymoses, No cyanosis	  Neurologic: Non-focal  Extremities: Normal range of motion, No clubbing, cyanosis or edema  Vascular: Peripheral pulses palpable 2+ bilaterally        LABS:	 	    CBC Full  -  ( 05 Dec 2017 10:14 )  WBC Count : 5.8 K/uL  Hemoglobin : 13.2 g/dL  Hematocrit : 40.0 %  Platelet Count - Automated : 194 K/uL  Mean Cell Volume : 85.9 fl  Mean Cell Hemoglobin : 28.3 pg  Mean Cell Hemoglobin Concentration : 32.9 gm/dL      12-05    142  |  107  |  18  ----------------------------<  97  4.3   |  28  |  1.14  12-04    142  |  106  |  17  ----------------------------<  99  3.6   |  27  |  1.20    Ca    8.3<L>      05 Dec 2017 05:05  Ca    8.7      04 Dec 2017 23:31  Mg     2.4     12-04    TPro  7.9  /  Alb  3.8  /  TBili  1.0  /  DBili  x   /  AST  21  /  ALT  26  /  AlkPhos  69  12-04    CARDIAC MARKERS:  Troponin I, Serum: .029 ng/mL (12-05 @ 10:14)  Troponin I, Serum: .057 ng/mL (12-05 @ 05:05)  Troponin I, Serum: .074 ng/mL (12-04 @ 23:31)     	    ECG:  	Atrial flutter HR 97    PREVIOUS DIAGNOSTIC TESTING:    [ ] Echocardiogram: LA Size 5.0  Left Ventricle  Normal left ventricular size and wall thickness.  The left ventricular wall motion is normal.  The left ventricular ejection fraction is estimated to be 50-55%  The left ventricular ejection fraction is 54%.  Left Atrium  Unable to determine LV diastolic dysfunction in view of mitral ring.  The left atrium is severely dilated.  The interatrial septum is intact with no color Doppler evidence for  interatrial shunting  Right Atrium  The right atrium is severely dilated.  Right Ventricle  The right ventricle is borderline dilated.  The right ventricular systolic function is normal.  Aortic Valve  There is mild aortic valve thickening.  The aortic valve is trileaflet.  There is mild aortic regurgitation.  No hemodynamically significant valvular aortic stenosis.  Mitral Valve  An annuloplasty ring is noted in the mitral position.  There is mild mitral valve thickening.  There is mild to moderate mitral regurgitation.  The mean pressure gradient is 4 mmHg.  Tricuspid Valve  An annuloplasty ring is noted in the tricuspid position.  There is mild tricuspid regurgitation.  The mean gradient across the tricuspid valve is 2 mmHg  Pulmonic Valve  Structurally normal pulmonic valve.  No pulmonic regurgitation noted.  Arteries and Venous System  No aortic root dilatation.  The inferior vena cava is normal in size (<2.1 cm) with normal inspiratory  collapse (>50%) consistent with normal right atrial pressure.  Pericardium / Pleura  There is no pericardial effusion.      [ ]  Catheterization: Per [facility] chart review, patient underwent cath in 2016 prior to cardiac surgery with no obstructive CAD    	  ASSESSMENT/PLAN: 	  57M w/HTN, chronic systolic heart failure with LVEF 35-40%, MV leaflet with severe MV regurgitation, and TR s/p minimally invasive MV/TV repair and UTE ligation with atriclip, and extensive left sided MAZE p/w palpitations to an OSH found to atrial flutter  transferred for EP evaluation for aflutter ablation.     #Aflutter--CHADSVASC 2 (HTN and CHF)  - on heparin gtt for AC  - Lopressor 25mg BID and will uptitrate as needed; Currently rate controlled  - Telemetry  - check TTE  - Plan for consideration for AF ablation; will d/w Dr. Holguin in am     #Chronic systolic heart failure; euvolemic   - Monitor volume status   - c/w BB   - c/w ASA/statin     #HTN:  c/w antihypertensives       [id number]

## 2017-12-05 NOTE — DISCHARGE NOTE ADULT - HOSPITAL COURSE
58 y/o Man PMH HTN, chronic systolic heart failure with LVEF 35-40%, MV and TV repair with UTE clip and MAZE 2016;    Admitted overnight with tachycardia and palpitations; found to be in aflutter.  Currently rate-controlled.  HD stable.    Transfer to NS/EPS for evaluation for ablation.  -cont heparin gtt and metoprolol

## 2017-12-05 NOTE — DISCHARGE NOTE ADULT - MEDICATION SUMMARY - MEDICATIONS TO TAKE
I will START or STAY ON the medications listed below when I get home from the hospital:    finasteride 5 mg oral tablet  -- 1 tab(s) by mouth once a day  -- Indication: For Bph    aspirin 81 mg oral delayed release tablet  -- 1 tab(s) by mouth once a day  -- Indication: For cad    tamsulosin 0.4 mg oral capsule  -- 1 cap(s) by mouth once a day (at bedtime)  -- Indication: For Benign prostatic hyperp    heparin 100 units/mL-D5% intravenous solution  -- 12 milliliter(s) intravenous   -- Indication: For aflutter    heparin  -- Indication: For aflutter     heparin  -- Indication: For alfutter     atorvastatin 20 mg oral tablet  -- 1 tab(s) by mouth once a day (at bedtime)  -- Indication: For H/O mitral valve repair    metoprolol tartrate 25 mg oral tablet  -- 1 tab(s) by mouth 2 times a day  -- Indication: For aflutter     influenza virus vaccine, inactivated  -- Indication: For flu     pantoprazole 40 mg oral delayed release tablet  -- 1 tab(s) by mouth once a day (before a meal)  -- Indication: For italia d

## 2017-12-05 NOTE — H&P ADULT - HISTORY OF PRESENT ILLNESS
58 y/o Man PMH htn, valve repair of mitral and tricuspid last year, presents with  tachycardia and palpitations, but no CP, SOB, dizziness, nausea or diaphoresis. Symptoms began this afternoon at rest and without exertion, no recent travel or leg swelling. Pt  took an extra extended release metoprolol 50 mg, which relieved symptoms.

## 2017-12-05 NOTE — DISCHARGE NOTE ADULT - MEDICATION SUMMARY - MEDICATIONS TO STOP TAKING
I will STOP taking the medications listed below when I get home from the hospital:    Coumadin 5 mg oral tablet  -- 1 tab(s) by mouth once a day    amLODIPine 5 mg oral tablet  -- 1 tab(s) by mouth once a day

## 2017-12-05 NOTE — H&P ADULT - NSHPPHYSICALEXAM_GEN_ALL_CORE
T(C): 36.7 (05 Dec 2017 00:57), Max: 36.7 (05 Dec 2017 00:57)  T(F): 98 (05 Dec 2017 00:57), Max: 98 (05 Dec 2017 00:57)  HR: 65 (05 Dec 2017 01:50) (65 - 118)  BP: 109/76 (05 Dec 2017 01:50) (109/76 - 116/90)  BP(mean): --  RR: 16 (05 Dec 2017 01:50) (16 - 20)  SpO2: 100% (05 Dec 2017 01:50) (99% - 100%)    PHYSICAL EXAM:  GENERAL: NAD, well-groomed, well-developed  HEAD:  Atraumatic, Normocephalic  EYES: EOMI, PERRLA, conjunctiva and sclera clear  ENMT: No tonsillar erythema, exudates, or enlargement; Moist mucous membranes,  No lesions  NECK: Supple, No JVD, Normal thyroid  NERVOUS SYSTEM:  Alert & Oriented X3, Good concentration; Motor Strength 5/5 B/L upper and lower extremities; DTRs 2+ intact and symmetric  CHEST/LUNG: Clear to percussion bilaterally; No rales, rhonchi, wheezing, or rubs  HEART: Regular rate and rhythm, tachycardic; 2/6 holosystolic murmur, no rubs, or gallops  ABDOMEN: Soft, Nontender, Nondistended; Bowel sounds present  EXTREMITIES:  2+ Peripheral Pulses, No clubbing, cyanosis, or edema  LYMPH: No lymphadenopathy noted  SKIN: No rashes or lesions

## 2017-12-06 ENCOUNTER — TRANSCRIPTION ENCOUNTER (OUTPATIENT)
Age: 57
End: 2017-12-06

## 2017-12-06 LAB
ALBUMIN SERPL ELPH-MCNC: 3.4 G/DL — SIGNIFICANT CHANGE UP (ref 3.3–5)
ALP SERPL-CCNC: 40 U/L — SIGNIFICANT CHANGE UP (ref 40–120)
ALT FLD-CCNC: 15 U/L RC — SIGNIFICANT CHANGE UP (ref 10–45)
ANION GAP SERPL CALC-SCNC: 13 MMOL/L — SIGNIFICANT CHANGE UP (ref 5–17)
ANION GAP SERPL CALC-SCNC: 14 MMOL/L — SIGNIFICANT CHANGE UP (ref 5–17)
APTT BLD: 72.9 SEC — HIGH (ref 27.5–37.4)
APTT BLD: 76.4 SEC — HIGH (ref 27.5–37.4)
APTT BLD: > 200 SEC (ref 27.5–37.4)
AST SERPL-CCNC: 25 U/L — SIGNIFICANT CHANGE UP (ref 10–40)
BILIRUB SERPL-MCNC: 1.4 MG/DL — HIGH (ref 0.2–1.2)
BUN SERPL-MCNC: 15 MG/DL — SIGNIFICANT CHANGE UP (ref 7–23)
BUN SERPL-MCNC: 17 MG/DL — SIGNIFICANT CHANGE UP (ref 7–23)
CALCIUM SERPL-MCNC: 8.4 MG/DL — SIGNIFICANT CHANGE UP (ref 8.4–10.5)
CALCIUM SERPL-MCNC: 9 MG/DL — SIGNIFICANT CHANGE UP (ref 8.4–10.5)
CHLORIDE SERPL-SCNC: 102 MMOL/L — SIGNIFICANT CHANGE UP (ref 96–108)
CHLORIDE SERPL-SCNC: 107 MMOL/L — SIGNIFICANT CHANGE UP (ref 96–108)
CO2 SERPL-SCNC: 22 MMOL/L — SIGNIFICANT CHANGE UP (ref 22–31)
CO2 SERPL-SCNC: 26 MMOL/L — SIGNIFICANT CHANGE UP (ref 22–31)
CREAT SERPL-MCNC: 1.29 MG/DL — SIGNIFICANT CHANGE UP (ref 0.5–1.3)
CREAT SERPL-MCNC: 1.36 MG/DL — HIGH (ref 0.5–1.3)
CULTURE RESULTS: NO GROWTH — SIGNIFICANT CHANGE UP
GLUCOSE SERPL-MCNC: 70 MG/DL — SIGNIFICANT CHANGE UP (ref 70–99)
GLUCOSE SERPL-MCNC: 84 MG/DL — SIGNIFICANT CHANGE UP (ref 70–99)
HCT VFR BLD CALC: 39.5 % — SIGNIFICANT CHANGE UP (ref 39–50)
HCT VFR BLD CALC: 39.9 % — SIGNIFICANT CHANGE UP (ref 39–50)
HGB BLD-MCNC: 12.8 G/DL — LOW (ref 13–17)
HGB BLD-MCNC: 13.3 G/DL — SIGNIFICANT CHANGE UP (ref 13–17)
INR BLD: 1.41 RATIO — HIGH (ref 0.88–1.16)
MAGNESIUM SERPL-MCNC: 1.8 MG/DL — SIGNIFICANT CHANGE UP (ref 1.6–2.6)
MAGNESIUM SERPL-MCNC: 2 MG/DL — SIGNIFICANT CHANGE UP (ref 1.6–2.6)
MCHC RBC-ENTMCNC: 29 PG — SIGNIFICANT CHANGE UP (ref 27–34)
MCHC RBC-ENTMCNC: 29.4 PG — SIGNIFICANT CHANGE UP (ref 27–34)
MCHC RBC-ENTMCNC: 32.4 GM/DL — SIGNIFICANT CHANGE UP (ref 32–36)
MCHC RBC-ENTMCNC: 33.3 GM/DL — SIGNIFICANT CHANGE UP (ref 32–36)
MCV RBC AUTO: 88.5 FL — SIGNIFICANT CHANGE UP (ref 80–100)
MCV RBC AUTO: 89.5 FL — SIGNIFICANT CHANGE UP (ref 80–100)
PHOSPHATE SERPL-MCNC: 4.2 MG/DL — SIGNIFICANT CHANGE UP (ref 2.5–4.5)
PLATELET # BLD AUTO: 172 K/UL — SIGNIFICANT CHANGE UP (ref 150–400)
PLATELET # BLD AUTO: 183 K/UL — SIGNIFICANT CHANGE UP (ref 150–400)
POTASSIUM SERPL-MCNC: 3.7 MMOL/L — SIGNIFICANT CHANGE UP (ref 3.5–5.3)
POTASSIUM SERPL-MCNC: 3.9 MMOL/L — SIGNIFICANT CHANGE UP (ref 3.5–5.3)
POTASSIUM SERPL-SCNC: 3.7 MMOL/L — SIGNIFICANT CHANGE UP (ref 3.5–5.3)
POTASSIUM SERPL-SCNC: 3.9 MMOL/L — SIGNIFICANT CHANGE UP (ref 3.5–5.3)
PROT SERPL-MCNC: 6.1 G/DL — SIGNIFICANT CHANGE UP (ref 6–8.3)
PROTHROM AB SERPL-ACNC: 15.5 SEC — HIGH (ref 9.8–12.7)
RBC # BLD: 4.41 M/UL — SIGNIFICANT CHANGE UP (ref 4.2–5.8)
RBC # BLD: 4.5 M/UL — SIGNIFICANT CHANGE UP (ref 4.2–5.8)
RBC # FLD: 13.2 % — SIGNIFICANT CHANGE UP (ref 10.3–14.5)
RBC # FLD: 13.3 % — SIGNIFICANT CHANGE UP (ref 10.3–14.5)
SODIUM SERPL-SCNC: 141 MMOL/L — SIGNIFICANT CHANGE UP (ref 135–145)
SODIUM SERPL-SCNC: 143 MMOL/L — SIGNIFICANT CHANGE UP (ref 135–145)
SPECIMEN SOURCE: SIGNIFICANT CHANGE UP
WBC # BLD: 10.1 K/UL — SIGNIFICANT CHANGE UP (ref 3.8–10.5)
WBC # BLD: 5.6 K/UL — SIGNIFICANT CHANGE UP (ref 3.8–10.5)
WBC # FLD AUTO: 10.1 K/UL — SIGNIFICANT CHANGE UP (ref 3.8–10.5)
WBC # FLD AUTO: 5.6 K/UL — SIGNIFICANT CHANGE UP (ref 3.8–10.5)

## 2017-12-06 PROCEDURE — 93312 ECHO TRANSESOPHAGEAL: CPT | Mod: 26

## 2017-12-06 PROCEDURE — 93010 ELECTROCARDIOGRAM REPORT: CPT

## 2017-12-06 PROCEDURE — 93306 TTE W/DOPPLER COMPLETE: CPT | Mod: 26

## 2017-12-06 PROCEDURE — 99233 SBSQ HOSP IP/OBS HIGH 50: CPT

## 2017-12-06 PROCEDURE — 76376 3D RENDER W/INTRP POSTPROCES: CPT | Mod: 26

## 2017-12-06 RX ORDER — HEPARIN SODIUM 5000 [USP'U]/ML
900 INJECTION INTRAVENOUS; SUBCUTANEOUS
Qty: 25000 | Refills: 0 | Status: DISCONTINUED | OUTPATIENT
Start: 2017-12-07 | End: 2017-12-07

## 2017-12-06 RX ORDER — POTASSIUM CHLORIDE 20 MEQ
20 PACKET (EA) ORAL ONCE
Qty: 0 | Refills: 0 | Status: COMPLETED | OUTPATIENT
Start: 2017-12-06 | End: 2017-12-06

## 2017-12-06 RX ORDER — HEPARIN SODIUM 5000 [USP'U]/ML
6500 INJECTION INTRAVENOUS; SUBCUTANEOUS EVERY 6 HOURS
Qty: 0 | Refills: 0 | Status: DISCONTINUED | OUTPATIENT
Start: 2017-12-06 | End: 2017-12-07

## 2017-12-06 RX ORDER — SUCRALFATE 1 G
1 TABLET ORAL EVERY 6 HOURS
Qty: 0 | Refills: 0 | Status: DISCONTINUED | OUTPATIENT
Start: 2017-12-06 | End: 2017-12-07

## 2017-12-06 RX ORDER — HEPARIN SODIUM 5000 [USP'U]/ML
3000 INJECTION INTRAVENOUS; SUBCUTANEOUS EVERY 6 HOURS
Qty: 0 | Refills: 0 | Status: DISCONTINUED | OUTPATIENT
Start: 2017-12-06 | End: 2017-12-07

## 2017-12-06 RX ORDER — MAGNESIUM SULFATE 500 MG/ML
1 VIAL (ML) INJECTION ONCE
Qty: 0 | Refills: 0 | Status: COMPLETED | OUTPATIENT
Start: 2017-12-06 | End: 2017-12-06

## 2017-12-06 RX ADMIN — Medication 100 GRAM(S): at 22:33

## 2017-12-06 RX ADMIN — PANTOPRAZOLE SODIUM 40 MILLIGRAM(S): 20 TABLET, DELAYED RELEASE ORAL at 05:39

## 2017-12-06 RX ADMIN — Medication 25 MILLIGRAM(S): at 22:34

## 2017-12-06 RX ADMIN — HEPARIN SODIUM 900 UNIT(S)/HR: 5000 INJECTION INTRAVENOUS; SUBCUTANEOUS at 12:11

## 2017-12-06 RX ADMIN — Medication 81 MILLIGRAM(S): at 05:39

## 2017-12-06 RX ADMIN — HEPARIN SODIUM 900 UNIT(S)/HR: 5000 INJECTION INTRAVENOUS; SUBCUTANEOUS at 04:08

## 2017-12-06 RX ADMIN — Medication 1 GRAM(S): at 22:34

## 2017-12-06 RX ADMIN — FINASTERIDE 5 MILLIGRAM(S): 5 TABLET, FILM COATED ORAL at 22:34

## 2017-12-06 RX ADMIN — Medication 25 MILLIGRAM(S): at 05:39

## 2017-12-06 RX ADMIN — ATORVASTATIN CALCIUM 20 MILLIGRAM(S): 80 TABLET, FILM COATED ORAL at 22:34

## 2017-12-06 RX ADMIN — TAMSULOSIN HYDROCHLORIDE 0.4 MILLIGRAM(S): 0.4 CAPSULE ORAL at 22:34

## 2017-12-06 RX ADMIN — Medication 20 MILLIEQUIVALENT(S): at 22:34

## 2017-12-06 NOTE — PROGRESS NOTE ADULT - SUBJECTIVE AND OBJECTIVE BOX
Patient seen and examined at bedside.  doing well w/ no new/acute complaints.     Review Of Systems: No chest pain, shortness of breath, or palpitations            Medications:  acetaminophen   Tablet. 650 milliGRAM(s) Oral every 6 hours PRN  aspirin enteric coated 81 milliGRAM(s) Oral daily  atorvastatin 20 milliGRAM(s) Oral at bedtime  finasteride 5 milliGRAM(s) Oral daily  heparin  Infusion. 900 Unit(s)/Hr IV Continuous <Continuous>  heparin  Injectable 6500 Unit(s) IV Push every 6 hours PRN  heparin  Injectable 3000 Unit(s) IV Push every 6 hours PRN  influenza   Vaccine 0.5 milliLiter(s) IntraMuscular once  metoprolol     tartrate 25 milliGRAM(s) Oral every 12 hours  pantoprazole    Tablet 40 milliGRAM(s) Oral before breakfast  tamsulosin 0.4 milliGRAM(s) Oral at bedtime      PAST MEDICAL & SURGICAL HISTORY:  Hernia, femoral  Atrial fibrillation  BPH (benign prostatic hyperplasia)  Mitral insufficiency  Systolic CHF, acute on chronic  H/O tricuspid valve repair  H/O mitral valve repair      Vitals:  T(F): 98.1 (12-06), Max: 98.1 (12-06)  HR: 111 (12-06) (103 - 111)  BP: 110/90 (12-06) (109/84 - 110/90)  RR: 17 (12-06)  SpO2: 98% (12-06)  I&O's Summary    05 Dec 2017 07:01  -  06 Dec 2017 07:00  --------------------------------------------------------  IN: 300 mL / OUT: 300 mL / NET: 0 mL    06 Dec 2017 07:01  -  06 Dec 2017 20:13  --------------------------------------------------------  IN: 0 mL / OUT: 300 mL / NET: -300 mL        Physical Exam:  Appearance: No acute distress; well appearing  Eyes: PERRL, EOMI, pink conjunctiva  HENT: Normal oral muscosa  Cardiovascular: RRR, S1, S2, no murmurs, rubs, or gallops; no edema; no JVD  Respiratory: Clear to auscultation bilaterally  Gastrointestinal: soft, non-tender, non-distended with normal bowel sounds  Musculoskeletal: No clubbing; no joint deformity   Neurologic: Non-focal  Lymphatic: No lymphadenopathy  Psychiatry: AAOx3, mood & affect appropriate  Skin: No rashes, ecchymoses, or cyanosis                          13.3   5.6   )-----------( 183      ( 06 Dec 2017 03:29 )             39.9     12-06    141  |  102  |  17  ----------------------------<  84  3.7   |  26  |  1.36<H>    Ca    9.0      06 Dec 2017 05:38  Mg     2.0     12-06    TPro  7.9  /  Alb  3.8  /  TBili  1.0  /  DBili  x   /  AST  21  /  ALT  26  /  AlkPhos  69  12-04    PT/INR - ( 05 Dec 2017 21:03 )   PT: 13.0 sec;   INR: 1.20 ratio         PTT - ( 06 Dec 2017 10:30 )  PTT:76.4 sec  CARDIAC MARKERS ( 05 Dec 2017 10:14 )  .029 ng/mL / x     / x     / x     / x      CARDIAC MARKERS ( 05 Dec 2017 05:05 )  .057 ng/mL / x     / x     / x     / x      CARDIAC MARKERS ( 04 Dec 2017 23:31 )  .074 ng/mL / x     / x     / x     / 3.4 ng/mL    Interpretation of Telemetry:  a-flutter HR 90s-110s     57M w/HTN, HFpEF (50-55%), MVR/TVR s/p double valve repair, UTE ligation with atriclip, a-fib s/p left sided MAZE admitted for palpitations due to a-flutter.  Transferred to [facility] for aflutter ablation.  CHADS2 V     Plan  - proceed to A-flutter ablation today   - start hep GTT afterwards  - cont BB therapy   - no further interventions at the moment     Ari Torres MD   EPS [id number] before 5 pm   [id number] after 5 pm

## 2017-12-06 NOTE — CHART NOTE - NSCHARTNOTEFT_GEN_A_CORE
====================  NEW EVENTS:  ====================  No new events o/n    ====================  SUMMARY:  ====================  58 y/o Man PMH htn, valve repair of mitral and tricuspid last year on coumadin, admitted to [facility] for tachycardia and palpitations, and found to be Aflutter and treated with BB an extra extended release metoprolol 50 mg, which relieved symptoms and started Heparin drip. Now is transferred to [facility] for Aflutter ablation by Dr. Holguin,     ====================  VITALS:  ====================    ICU Vital Signs Last 24 Hrs  T(C): 37.2 (06 Dec 2017 21:00), Max: 37.2 (06 Dec 2017 21:00)  T(F): 98.9 (06 Dec 2017 21:00), Max: 98.9 (06 Dec 2017 21:00)  HR: 73 (06 Dec 2017 22:00) (73 - 111)  BP: 102/70 (06 Dec 2017 22:00) (102/70 - 110/90)  BP(mean): 80 (06 Dec 2017 22:00) (80 - 86)  ABP: --  ABP(mean): --  RR: 10 (06 Dec 2017 22:00) (10 - 18)  SpO2: 100% (06 Dec 2017 22:00) (97% - 100%)      I&O's Summary    05 Dec 2017 07:01  -  06 Dec 2017 07:00  --------------------------------------------------------  IN: 300 mL / OUT: 300 mL / NET: 0 mL    06 Dec 2017 07:01  -  06 Dec 2017 23:24  --------------------------------------------------------  IN: 100 mL / OUT: 900 mL / NET: -800 mL        ====================  LABS:  ====================                          12.8   10.1  )-----------( 172      ( 06 Dec 2017 21:37 )             39.5     12-06    143  |  107  |  15  ----------------------------<  70  3.9   |  22  |  1.29    Ca    8.4      06 Dec 2017 21:37  Phos  4.2     12-06  Mg     1.8     12-06    TPro  6.1  /  Alb  3.4  /  TBili  1.4<H>  /  DBili  x   /  AST  25  /  ALT  15  /  AlkPhos  40  12-06    PT/INR - ( 06 Dec 2017 21:37 )   PT: 15.5 sec;   INR: 1.41 ratio         PTT - ( 06 Dec 2017 21:37 )  PTT:> 200 sec        ====================  PLAN:  ====================  - start hep gtt at 1AM  - start protonix/carafate  - TTE in AM to r/o effusion

## 2017-12-07 VITALS
TEMPERATURE: 98 F | SYSTOLIC BLOOD PRESSURE: 103 MMHG | RESPIRATION RATE: 18 BRPM | OXYGEN SATURATION: 97 % | HEART RATE: 73 BPM | DIASTOLIC BLOOD PRESSURE: 58 MMHG

## 2017-12-07 DIAGNOSIS — N40.0 BENIGN PROSTATIC HYPERPLASIA WITHOUT LOWER URINARY TRACT SYMPTOMS: ICD-10-CM

## 2017-12-07 DIAGNOSIS — I48.92 UNSPECIFIED ATRIAL FLUTTER: ICD-10-CM

## 2017-12-07 DIAGNOSIS — E78.5 HYPERLIPIDEMIA, UNSPECIFIED: ICD-10-CM

## 2017-12-07 LAB
ALBUMIN SERPL ELPH-MCNC: 3.7 G/DL — SIGNIFICANT CHANGE UP (ref 3.3–5)
ALP SERPL-CCNC: 41 U/L — SIGNIFICANT CHANGE UP (ref 40–120)
ALT FLD-CCNC: 16 U/L RC — SIGNIFICANT CHANGE UP (ref 10–45)
ANION GAP SERPL CALC-SCNC: 14 MMOL/L — SIGNIFICANT CHANGE UP (ref 5–17)
APTT BLD: 64.1 SEC — HIGH (ref 27.5–37.4)
AST SERPL-CCNC: 34 U/L — SIGNIFICANT CHANGE UP (ref 10–40)
BILIRUB SERPL-MCNC: 1.7 MG/DL — HIGH (ref 0.2–1.2)
BUN SERPL-MCNC: 15 MG/DL — SIGNIFICANT CHANGE UP (ref 7–23)
CALCIUM SERPL-MCNC: 8.6 MG/DL — SIGNIFICANT CHANGE UP (ref 8.4–10.5)
CHLORIDE SERPL-SCNC: 105 MMOL/L — SIGNIFICANT CHANGE UP (ref 96–108)
CHOLEST SERPL-MCNC: 135 MG/DL — SIGNIFICANT CHANGE UP (ref 10–199)
CO2 SERPL-SCNC: 23 MMOL/L — SIGNIFICANT CHANGE UP (ref 22–31)
CREAT SERPL-MCNC: 1.25 MG/DL — SIGNIFICANT CHANGE UP (ref 0.5–1.3)
GLUCOSE SERPL-MCNC: 88 MG/DL — SIGNIFICANT CHANGE UP (ref 70–99)
HBA1C BLD-MCNC: 5.6 % — SIGNIFICANT CHANGE UP (ref 4–5.6)
HCT VFR BLD CALC: 36.6 % — LOW (ref 39–50)
HDLC SERPL-MCNC: 56 MG/DL — SIGNIFICANT CHANGE UP (ref 40–125)
HGB BLD-MCNC: 12.2 G/DL — LOW (ref 13–17)
LIPID PNL WITH DIRECT LDL SERPL: 70 MG/DL — SIGNIFICANT CHANGE UP
MAGNESIUM SERPL-MCNC: 1.9 MG/DL — SIGNIFICANT CHANGE UP (ref 1.6–2.6)
MCHC RBC-ENTMCNC: 29.6 PG — SIGNIFICANT CHANGE UP (ref 27–34)
MCHC RBC-ENTMCNC: 33.3 GM/DL — SIGNIFICANT CHANGE UP (ref 32–36)
MCV RBC AUTO: 89 FL — SIGNIFICANT CHANGE UP (ref 80–100)
PHOSPHATE SERPL-MCNC: 4.3 MG/DL — SIGNIFICANT CHANGE UP (ref 2.5–4.5)
PLATELET # BLD AUTO: 159 K/UL — SIGNIFICANT CHANGE UP (ref 150–400)
POTASSIUM SERPL-MCNC: 4.2 MMOL/L — SIGNIFICANT CHANGE UP (ref 3.5–5.3)
POTASSIUM SERPL-SCNC: 4.2 MMOL/L — SIGNIFICANT CHANGE UP (ref 3.5–5.3)
PROT SERPL-MCNC: 6.5 G/DL — SIGNIFICANT CHANGE UP (ref 6–8.3)
RBC # BLD: 4.12 M/UL — LOW (ref 4.2–5.8)
RBC # FLD: 13.3 % — SIGNIFICANT CHANGE UP (ref 10.3–14.5)
SODIUM SERPL-SCNC: 142 MMOL/L — SIGNIFICANT CHANGE UP (ref 135–145)
TOTAL CHOLESTEROL/HDL RATIO MEASUREMENT: 2.4 RATIO — LOW (ref 3.4–9.6)
TRIGL SERPL-MCNC: 45 MG/DL — SIGNIFICANT CHANGE UP (ref 10–149)
TSH SERPL-MCNC: 3.3 UIU/ML — SIGNIFICANT CHANGE UP (ref 0.27–4.2)
WBC # BLD: 9.1 K/UL — SIGNIFICANT CHANGE UP (ref 3.8–10.5)
WBC # FLD AUTO: 9.1 K/UL — SIGNIFICANT CHANGE UP (ref 3.8–10.5)

## 2017-12-07 PROCEDURE — 86850 RBC ANTIBODY SCREEN: CPT

## 2017-12-07 PROCEDURE — C1894: CPT

## 2017-12-07 PROCEDURE — C1893: CPT

## 2017-12-07 PROCEDURE — 83036 HEMOGLOBIN GLYCOSYLATED A1C: CPT

## 2017-12-07 PROCEDURE — 93308 TTE F-UP OR LMTD: CPT | Mod: 26

## 2017-12-07 PROCEDURE — 85027 COMPLETE CBC AUTOMATED: CPT

## 2017-12-07 PROCEDURE — 80053 COMPREHEN METABOLIC PANEL: CPT

## 2017-12-07 PROCEDURE — 93306 TTE W/DOPPLER COMPLETE: CPT

## 2017-12-07 PROCEDURE — 93312 ECHO TRANSESOPHAGEAL: CPT

## 2017-12-07 PROCEDURE — 84443 ASSAY THYROID STIM HORMONE: CPT

## 2017-12-07 PROCEDURE — 76376 3D RENDER W/INTRP POSTPROCES: CPT

## 2017-12-07 PROCEDURE — 90686 IIV4 VACC NO PRSV 0.5 ML IM: CPT

## 2017-12-07 PROCEDURE — 86900 BLOOD TYPING SEROLOGIC ABO: CPT

## 2017-12-07 PROCEDURE — 85730 THROMBOPLASTIN TIME PARTIAL: CPT

## 2017-12-07 PROCEDURE — C1730: CPT

## 2017-12-07 PROCEDURE — C1759: CPT

## 2017-12-07 PROCEDURE — 85610 PROTHROMBIN TIME: CPT

## 2017-12-07 PROCEDURE — C1766: CPT

## 2017-12-07 PROCEDURE — 99238 HOSP IP/OBS DSCHRG MGMT 30/<: CPT

## 2017-12-07 PROCEDURE — C1732: CPT

## 2017-12-07 PROCEDURE — 80048 BASIC METABOLIC PNL TOTAL CA: CPT

## 2017-12-07 PROCEDURE — 84100 ASSAY OF PHOSPHORUS: CPT

## 2017-12-07 PROCEDURE — 93321 DOPPLER ECHO F-UP/LMTD STD: CPT

## 2017-12-07 PROCEDURE — 93321 DOPPLER ECHO F-UP/LMTD STD: CPT | Mod: 26

## 2017-12-07 PROCEDURE — 93308 TTE F-UP OR LMTD: CPT

## 2017-12-07 PROCEDURE — 99233 SBSQ HOSP IP/OBS HIGH 50: CPT

## 2017-12-07 PROCEDURE — 80061 LIPID PANEL: CPT

## 2017-12-07 PROCEDURE — 83735 ASSAY OF MAGNESIUM: CPT

## 2017-12-07 PROCEDURE — 93010 ELECTROCARDIOGRAM REPORT: CPT

## 2017-12-07 PROCEDURE — 86901 BLOOD TYPING SEROLOGIC RH(D): CPT

## 2017-12-07 PROCEDURE — 93005 ELECTROCARDIOGRAM TRACING: CPT

## 2017-12-07 RX ORDER — MAGNESIUM SULFATE 500 MG/ML
2 VIAL (ML) INJECTION ONCE
Qty: 0 | Refills: 0 | Status: COMPLETED | OUTPATIENT
Start: 2017-12-07 | End: 2017-12-07

## 2017-12-07 RX ORDER — APIXABAN 2.5 MG/1
1 TABLET, FILM COATED ORAL
Qty: 60 | Refills: 2
Start: 2017-12-07 | End: 2018-03-06

## 2017-12-07 RX ORDER — APIXABAN 2.5 MG/1
5 TABLET, FILM COATED ORAL EVERY 12 HOURS
Qty: 0 | Refills: 0 | Status: DISCONTINUED | OUTPATIENT
Start: 2017-12-07 | End: 2017-12-07

## 2017-12-07 RX ADMIN — Medication 25 MILLIGRAM(S): at 09:32

## 2017-12-07 RX ADMIN — Medication 81 MILLIGRAM(S): at 11:19

## 2017-12-07 RX ADMIN — Medication 50 GRAM(S): at 10:59

## 2017-12-07 RX ADMIN — HEPARIN SODIUM 900 UNIT(S)/HR: 5000 INJECTION INTRAVENOUS; SUBCUTANEOUS at 02:14

## 2017-12-07 RX ADMIN — PANTOPRAZOLE SODIUM 40 MILLIGRAM(S): 20 TABLET, DELAYED RELEASE ORAL at 05:58

## 2017-12-07 RX ADMIN — INFLUENZA VIRUS VACCINE 0.5 MILLILITER(S): 15; 15; 15; 15 SUSPENSION INTRAMUSCULAR at 09:32

## 2017-12-07 RX ADMIN — APIXABAN 5 MILLIGRAM(S): 2.5 TABLET, FILM COATED ORAL at 11:19

## 2017-12-07 RX ADMIN — Medication 1 GRAM(S): at 11:19

## 2017-12-07 RX ADMIN — HEPARIN SODIUM 900 UNIT(S)/HR: 5000 INJECTION INTRAVENOUS; SUBCUTANEOUS at 08:55

## 2017-12-07 RX ADMIN — Medication 1 GRAM(S): at 05:58

## 2017-12-07 NOTE — CHART NOTE - NSCHARTNOTEFT_GEN_A_CORE
Seen and examined patient.  Doing well in am.  Remains in NSR.     Plan  - cleared for discharge from an EP standpoint   - start eliquis 5 mg BID   - cont rest of meds as is   - will f/u w/ Dr. Hemant Holguin as outpatient     Ari Torres MD   EPS [phone] before 5pm  [phone] after 5 pm

## 2017-12-07 NOTE — DISCHARGE NOTE ADULT - PATIENT PORTAL LINK FT
----- Message from Fior Cohen RT sent at 10/16/2017  2:50 PM CDT -----  Contact: Carrie (mother)  [phone]   Carrie (mother)  [phone], requesting flu vaccine appt for pt, thanks.  
Left message to return call.  
“You can access the Follow[employer] Patient Portal, offered by [facility], by registering with the following website: [email or web address]”

## 2017-12-07 NOTE — PROGRESS NOTE ADULT - ATTENDING COMMENTS
Patient is seen and examined with fellow, NP and the CCU house-staff. I agree with the history, physical and the assessment and plan.  s/p Afib ablation in NSR  awating TTE to rule out an effusion - if negative for effusion, will start oral AC  continue with current medical therapy

## 2017-12-07 NOTE — DISCHARGE NOTE ADULT - PLAN OF CARE
Your heart rate and rhythm will be controlled. You have undergone an atrial flutter ablation. Please follow up with Dr. Holguin within 2 weeks. Do not lift heavy objects for 1 week. Do not exercise for 2 weeks. Occasional skipped beats or palpations which last a few hours a common after the procedure, but generally resolve after 3 months. If this occurs, do not be alarmed, just let your cardiologist know. Please call the office or go to the ER if you have chest pain, discharge, redness, swelling at the insertion site, dizziness or fainting.

## 2017-12-07 NOTE — DISCHARGE NOTE ADULT - HOSPITAL COURSE
58 y/o Man PMH HTN, chronic systolic heart failure with LVEF 35-40%, MV leaflet with severe MV regurgitation, and TR s/p minimally invasive MV/TV repair and UTE ligation with atriclip, and extensive left sided MAZE on coumadin, admitted to [facility] for tachycardia and palpitations, and found to be in Aflutter and treated with BB transferred to [facility] for Aflutter ablation by Dr. Holguin, 58 y/o Man PMH HTN, chronic systolic heart failure with LVEF 35-40%, MV leaflet with severe MV regurgitation, and TR s/p minimally invasive MV/TV repair and UTE ligation with atriclip, and extensive left sided MAZE on coumadin, admitted to [facility] for tachycardia and palpitations, and found to be in Aflutter and treated with BB transferred to [facility] for Aflutter ablation by Dr. Holguin.

## 2017-12-07 NOTE — DISCHARGE NOTE ADULT - MEDICATION SUMMARY - MEDICATIONS TO TAKE
I will START or STAY ON the medications listed below when I get home from the hospital:    finasteride 5 mg oral tablet  -- 1 tab(s) by mouth once a day  -- Indication: For BPH    tamsulosin 0.4 mg oral capsule  -- 1 cap(s) by mouth once a day (at bedtime)  -- Indication: For BPH    Eliquis 5 mg oral tablet  -- 1 tab(s) by mouth 2 times a day  -- Indication: For Atrial fibrillation    atorvastatin 20 mg oral tablet  -- 1 tab(s) by mouth once a day (at bedtime)  -- Indication: For HLD    metoprolol tartrate 25 mg oral tablet  -- 1 tab(s) by mouth 2 times a day  -- Indication: For Atrial fibrillation    influenza virus vaccine, inactivated  -- Indication: For Prophylaxis    pantoprazole 40 mg oral delayed release tablet  -- 1 tab(s) by mouth once a day (before a meal)  -- Indication: For GI Prophylaxis

## 2017-12-07 NOTE — DISCHARGE NOTE ADULT - MEDICATION SUMMARY - MEDICATIONS TO STOP TAKING
I will STOP taking the medications listed below when I get home from the hospital:    heparin 100 units/mL-D5% intravenous solution  -- 12 milliliter(s) intravenous    aspirin 81 mg oral delayed release tablet  -- 1 tab(s) by mouth once a day    heparin    heparin

## 2017-12-07 NOTE — DISCHARGE NOTE ADULT - CARE PLAN
Principal Discharge DX:	Atrial fibrillation  Goal:	Your heart rate and rhythm will be controlled.  Instructions for follow-up, activity and diet:	You have undergone an atrial flutter ablation. Please follow up with Dr. Holguin within 2 weeks. Do not lift heavy objects for 1 week. Do not exercise for 2 weeks. Occasional skipped beats or palpations which last a few hours a common after the procedure, but generally resolve after 3 months. If this occurs, do not be alarmed, just let your cardiologist know. Please call the office or go to the ER if you have chest pain, discharge, redness, swelling at the insertion site, dizziness or fainting. Principal Discharge DX:	Atrial flutter  Goal:	Your heart rate and rhythm will be controlled.  Instructions for follow-up, activity and diet:	You have undergone an atrial flutter ablation. Please follow up with Dr. Holguin within 2 weeks. Do not lift heavy objects for 1 week. Do not exercise for 2 weeks. Occasional skipped beats or palpations which last a few hours a common after the procedure, but generally resolve after 3 months. If this occurs, do not be alarmed, just let your cardiologist know. Please call the office or go to the ER if you have chest pain, discharge, redness, swelling at the insertion site, dizziness or fainting.

## 2017-12-07 NOTE — DISCHARGE NOTE ADULT - INSTRUCTIONS
Please maintain a heart healthy diet low in sodium and fat Please maintain a heart healthy diet low in sodium and fat and a mechanically soft diet

## 2017-12-07 NOTE — PROGRESS NOTE ADULT - ASSESSMENT
56 y/o Man PMH htn, valve repair of mitral and tricuspid last year on coumadin, admitted to [facility] for tachycardia and palpitations, and found to be Aflutter started on ER  metoprolol 50 mg, which relieved symptoms and started Heparin drip. Now is transferred to [facility] for Aflutter ablation, completed on 12/6 .By DEJAN Hendricks

## 2017-12-07 NOTE — PROGRESS NOTE ADULT - SUBJECTIVE AND OBJECTIVE BOX
Admission date:  CHIEF COMPLAINT:  HPI:  56 y/o Man PMH htn, valve repair of mitral and tricuspid last year on coumadin, admitted to [facility] for tachycardia and palpitations, and found to be Aflutter and treated with BB an extra extended release metoprolol 50 mg, which relieved symptoms and started Heparin drip. Now is transferred to [facility] for Aflutter ablation by DEJAN Hendricks (05 Dec 2017 20:26)    INTERVAL HISTORY:    REVIEW OF SYSTEMS: Denies xxxxxx; all others negative    MEDICATIONS  (STANDING):  aspirin enteric coated 81 milliGRAM(s) Oral daily  atorvastatin 20 milliGRAM(s) Oral at bedtime  finasteride 5 milliGRAM(s) Oral daily  heparin  Infusion. 900 Unit(s)/Hr (9 mL/Hr) IV Continuous <Continuous>  influenza   Vaccine 0.5 milliLiter(s) IntraMuscular once  metoprolol     tartrate 25 milliGRAM(s) Oral every 12 hours  pantoprazole    Tablet 40 milliGRAM(s) Oral before breakfast  sucralfate 1 Gram(s) Oral every 6 hours  tamsulosin 0.4 milliGRAM(s) Oral at bedtime    MEDICATIONS  (PRN):  acetaminophen   Tablet. 650 milliGRAM(s) Oral every 6 hours PRN Mild Pain (1 - 3)  heparin  Injectable 6500 Unit(s) IV Push every 6 hours PRN For aPTT less than 40  heparin  Injectable 3000 Unit(s) IV Push every 6 hours PRN For aPTT between 40 - 57      Objective:  ICU Vital Signs Last 24 Hrs  T(C): 37.2 (07 Dec 2017 07:00), Max: 37.2 (06 Dec 2017 21:00)  T(F): 98.9 (07 Dec 2017 07:00), Max: 98.9 (06 Dec 2017 21:00)  HR: 77 (07 Dec 2017 08:00) (73 - 111)  BP: 98/58 (07 Dec 2017 08:00) (97/66 - 118/76)  BP(mean): 70 (07 Dec 2017 08:00) (70 - 88)  ABP: --  ABP(mean): --  RR: 16 (07 Dec 2017 08:00) (10 - 18)  SpO2: 98% (07 Dec 2017 07:00) (98% - 100%)      Adult Advanced Hemodynamics Last 24 Hrs  CVP(mm Hg): --  CVP(cm H2O): --  CO: --  CI: --  PA: --  PA(mean): --  PCWP: --  SVR: --  SVRI: --  PVR: --  PVRI: --       @ 07:01  -   @ 07:00  --------------------------------------------------------  IN: 816 mL / OUT: 1600 mL / NET: -784 mL      Daily     Daily Weight in k.7 (07 Dec 2017 04:36)    PHYSICAL EXAM:      Constitutional:    HEENT:    Respiratory:    Cardiovascular:    Gastrointestinal:    Genitourinary:    Extremities:    Vascular:    Neurological:    Skin:      TELEMETRY:     EKG:     IMAGING:    Labs:                          12.8   10.1  )-----------( 172      ( 06 Dec 2017 21:37 )             39.5         143  |  107  |  15  ----------------------------<  70  3.9   |  22  |  1.29    Ca    8.4      06 Dec 2017 21:37  Phos  4.2       Mg     1.8         TPro  6.1  /  Alb  3.4  /  TBili  1.4<H>  /  DBili  x   /  AST  25  /  ALT  15  /  AlkPhos  40      LIVER FUNCTIONS - ( 06 Dec 2017 21:37 )  Alb: 3.4 g/dL / Pro: 6.1 g/dL / ALK PHOS: 40 U/L / ALT: 15 U/L RC / AST: 25 U/L / GGT: x           PT/INR - ( 06 Dec 2017 21:37 )   PT: 15.5 sec;   INR: 1.41 ratio         PTT - ( 06 Dec 2017 21:37 )  PTT:> 200 sec        HEALTH ISSUES - PROBLEM Dx: Admission date: 2017  CHIEF COMPLAINT:  HPI:  58 y/o Man PMH htn, valve repair of mitral and tricuspid last year on coumadin, admitted to [facility] for tachycardia and palpitations, and found to be Aflutter and treated with BB an extra extended release metoprolol 50 mg, which relieved symptoms and started Heparin drip. Now is transferred to [facility] for Aflutter ablation by DEJAN Hendricks (05 Dec 2017 20:26)    INTERVAL HISTORY: Pt feels well this AM, has ambulated without issue.    REVIEW OF SYSTEMS:  CONSTITUTIONAL: No weakness, fevers   EYES/ENT: No visual changes;  No vertigo or throat pain   NECK: No pain or stiffness  RESPIRATORY:  No shortness of breath  CARDIOVASCULAR: No chest pain or palpitations  GASTROINTESTINAL: No abdominal or epigastric pain. No nausea, vomiting  GENITOURINARY: No dysuria  NEUROLOGICAL: No numbness or weakness  SKIN: No itching, rashes      MEDICATIONS  (STANDING):  aspirin enteric coated 81 milliGRAM(s) Oral daily  atorvastatin 20 milliGRAM(s) Oral at bedtime  finasteride 5 milliGRAM(s) Oral daily  heparin  Infusion. 900 Unit(s)/Hr (9 mL/Hr) IV Continuous <Continuous>  influenza   Vaccine 0.5 milliLiter(s) IntraMuscular once  metoprolol     tartrate 25 milliGRAM(s) Oral every 12 hours  pantoprazole    Tablet 40 milliGRAM(s) Oral before breakfast  sucralfate 1 Gram(s) Oral every 6 hours  tamsulosin 0.4 milliGRAM(s) Oral at bedtime    MEDICATIONS  (PRN):  acetaminophen   Tablet. 650 milliGRAM(s) Oral every 6 hours PRN Mild Pain (1 - 3)  heparin  Injectable 6500 Unit(s) IV Push every 6 hours PRN For aPTT less than 40  heparin  Injectable 3000 Unit(s) IV Push every 6 hours PRN For aPTT between 40 - 57      Objective:  ICU Vital Signs Last 24 Hrs  T(C): 37.2 (07 Dec 2017 07:00), Max: 37.2 (06 Dec 2017 21:00)  T(F): 98.9 (07 Dec 2017 07:00), Max: 98.9 (06 Dec 2017 21:00)  HR: 77 (07 Dec 2017 08:00) (73 - 111)  BP: 98/58 (07 Dec 2017 08:00) (97/66 - 118/76)  BP(mean): 70 (07 Dec 2017 08:00) (70 - 88)  ABP: --  ABP(mean): --  RR: 16 (07 Dec 2017 08:00) (10 - 18)  SpO2: 98% (07 Dec 2017 07:00) (98% - 100%)         @ 07:01  -   @ 07:00  --------------------------------------------------------  IN: 816 mL / OUT: 1600 mL / NET: -784 mL      Daily     Daily Weight in k.7 (07 Dec 2017 04:36)    PHYSICAL EXAM:  Constitutional: WD, WN in NAD  HEENT: PERRLA; EOMI; no carotid bruits  Respiratory: CTA Anselmo;  Cardiovascular: S1S2 no murmurs; RRR  Gastrointestinal: +BS non tender  Genitourinary: Voids on own  Extremities: HENDERSON equally; (-) LE edema anselmo  Vascular: Rt femoral access site without hematoma or bleeding; fem pulses 2+ anselmo, pedal pulses 2+ anselmo  Neurological: A & O X3; no gross deficits noted  Skin: Dry & intact    TELEMETRY: NSR with occ. PVC's  EKG: NSR @ 75 bpm  IMAGING: < from: Limited Transthoracic Echo (17 @ 06:00) >  Limited TTE to r/o pericardial effusion.  No pericardial effusion    Labs:                       12.8   10.1  )-----------( 172      ( 06 Dec 2017 21:37 )             39.5     12-    143  |  107  |  15  ----------------------------<  70  3.9   |  22  |  1.29    Ca    8.4      06 Dec 2017 21:37  Phos  4.2     12-  Mg     1.8         TPro  6.1  /  Alb  3.4  /  TBili  1.4<H>  /  DBili  x   /  AST  25  /  ALT  15  /  AlkPhos  40  12    LIVER FUNCTIONS - ( 06 Dec 2017 21:37 )  Alb: 3.4 g/dL / Pro: 6.1 g/dL / ALK PHOS: 40 U/L / ALT: 15 U/L RC / AST: 25 U/L / GGT: x           PT/INR - ( 06 Dec 2017 21:37 )   PT: 15.5 sec;   INR: 1.41 ratio    PTT - ( 06 Dec 2017 21:37 )  PTT:> 200 sec        HEALTH ISSUES - PROBLEM Dx:

## 2017-12-07 NOTE — PROGRESS NOTE ADULT - PROBLEM SELECTOR PLAN 1
Ablation completed 12/6  D/C Heparin gtt, start Eliquis 5 mg PO BID  Continue Metoprolol 25 mg PO BID  Continue PPI & Carafate X 1 mos.   Soft mechanical diet  F/U with MD Holguin as outpt.  D/C home today

## 2017-12-07 NOTE — DISCHARGE NOTE ADULT - CARE PROVIDER_API CALL
Hemant Holguin), Cardiac Electrophysiology; Cardiovascular Disease  [address], [place] [id number]  Phone: [phone]  Fax: [phone]

## 2017-12-08 DIAGNOSIS — Z98.890 OTHER SPECIFIED POSTPROCEDURAL STATES: ICD-10-CM

## 2017-12-08 DIAGNOSIS — I08.1 RHEUMATIC DISORDERS OF BOTH MITRAL AND TRICUSPID VALVES: ICD-10-CM

## 2017-12-08 DIAGNOSIS — I48.92 UNSPECIFIED ATRIAL FLUTTER: ICD-10-CM

## 2017-12-08 DIAGNOSIS — I48.91 UNSPECIFIED ATRIAL FIBRILLATION: ICD-10-CM

## 2017-12-08 DIAGNOSIS — I47.1 SUPRAVENTRICULAR TACHYCARDIA: ICD-10-CM

## 2017-12-08 DIAGNOSIS — Z79.82 LONG TERM (CURRENT) USE OF ASPIRIN: ICD-10-CM

## 2017-12-08 DIAGNOSIS — I25.10 ATHEROSCLEROTIC HEART DISEASE OF NATIVE CORONARY ARTERY WITHOUT ANGINA PECTORIS: ICD-10-CM

## 2017-12-08 DIAGNOSIS — I50.22 CHRONIC SYSTOLIC (CONGESTIVE) HEART FAILURE: ICD-10-CM

## 2017-12-08 DIAGNOSIS — N40.0 BENIGN PROSTATIC HYPERPLASIA WITHOUT LOWER URINARY TRACT SYMPTOMS: ICD-10-CM

## 2017-12-08 DIAGNOSIS — K21.9 GASTRO-ESOPHAGEAL REFLUX DISEASE WITHOUT ESOPHAGITIS: ICD-10-CM

## 2017-12-08 DIAGNOSIS — R00.2 PALPITATIONS: ICD-10-CM

## 2017-12-08 DIAGNOSIS — Z79.01 LONG TERM (CURRENT) USE OF ANTICOAGULANTS: ICD-10-CM

## 2017-12-08 DIAGNOSIS — I34.0 NONRHEUMATIC MITRAL (VALVE) INSUFFICIENCY: ICD-10-CM

## 2017-12-08 DIAGNOSIS — R74.8 ABNORMAL LEVELS OF OTHER SERUM ENZYMES: ICD-10-CM

## 2017-12-08 DIAGNOSIS — I11.0 HYPERTENSIVE HEART DISEASE WITH HEART FAILURE: ICD-10-CM

## 2017-12-19 ENCOUNTER — NON-APPOINTMENT (OUTPATIENT)
Age: 57
End: 2017-12-19

## 2017-12-19 ENCOUNTER — APPOINTMENT (OUTPATIENT)
Dept: CARDIOLOGY | Facility: CLINIC | Age: 57
End: 2017-12-19
Payer: MEDICAID

## 2017-12-19 VITALS
HEIGHT: 67 IN | HEART RATE: 65 BPM | DIASTOLIC BLOOD PRESSURE: 82 MMHG | SYSTOLIC BLOOD PRESSURE: 120 MMHG | BODY MASS INDEX: 26.84 KG/M2 | OXYGEN SATURATION: 99 % | WEIGHT: 171 LBS

## 2017-12-19 PROCEDURE — 99214 OFFICE O/P EST MOD 30 MIN: CPT

## 2017-12-19 PROCEDURE — 93000 ELECTROCARDIOGRAM COMPLETE: CPT

## 2017-12-19 RX ORDER — PSEUDOEPHEDRINE HCL 30 MG/1
30 TABLET, FILM COATED ORAL
Refills: 0 | Status: DISCONTINUED | COMMUNITY
End: 2017-12-19

## 2017-12-19 RX ORDER — WARFARIN 5 MG/1
5 TABLET ORAL DAILY
Qty: 30 | Refills: 0 | Status: DISCONTINUED | COMMUNITY
End: 2017-12-19

## 2018-01-08 ENCOUNTER — NON-APPOINTMENT (OUTPATIENT)
Age: 58
End: 2018-01-08

## 2018-01-08 ENCOUNTER — APPOINTMENT (OUTPATIENT)
Dept: ELECTROPHYSIOLOGY | Facility: CLINIC | Age: 58
End: 2018-01-08
Payer: MEDICAID

## 2018-01-08 VITALS — OXYGEN SATURATION: 97 % | SYSTOLIC BLOOD PRESSURE: 166 MMHG | DIASTOLIC BLOOD PRESSURE: 88 MMHG | HEART RATE: 62 BPM

## 2018-01-08 PROCEDURE — 99215 OFFICE O/P EST HI 40 MIN: CPT

## 2018-01-08 PROCEDURE — 93000 ELECTROCARDIOGRAM COMPLETE: CPT

## 2018-01-30 ENCOUNTER — APPOINTMENT (OUTPATIENT)
Dept: CARDIOLOGY | Facility: CLINIC | Age: 58
End: 2018-01-30
Payer: MEDICAID

## 2018-01-30 ENCOUNTER — NON-APPOINTMENT (OUTPATIENT)
Age: 58
End: 2018-01-30

## 2018-01-30 VITALS
OXYGEN SATURATION: 98 % | DIASTOLIC BLOOD PRESSURE: 80 MMHG | WEIGHT: 169 LBS | HEART RATE: 77 BPM | BODY MASS INDEX: 26.53 KG/M2 | HEIGHT: 67 IN | SYSTOLIC BLOOD PRESSURE: 140 MMHG

## 2018-01-30 DIAGNOSIS — I50.21 ACUTE SYSTOLIC (CONGESTIVE) HEART FAILURE: ICD-10-CM

## 2018-01-30 DIAGNOSIS — K46.9 UNSPECIFIED ABDOMINAL HERNIA W/OUT OBSTRUCTION OR GANGRENE: ICD-10-CM

## 2018-01-30 PROCEDURE — 99214 OFFICE O/P EST MOD 30 MIN: CPT

## 2018-01-30 PROCEDURE — 93000 ELECTROCARDIOGRAM COMPLETE: CPT

## 2018-04-30 ENCOUNTER — APPOINTMENT (OUTPATIENT)
Dept: CARDIOLOGY | Facility: CLINIC | Age: 58
End: 2018-04-30
Payer: MEDICAID

## 2018-04-30 ENCOUNTER — NON-APPOINTMENT (OUTPATIENT)
Age: 58
End: 2018-04-30

## 2018-04-30 VITALS
HEIGHT: 67 IN | WEIGHT: 169 LBS | OXYGEN SATURATION: 98 % | BODY MASS INDEX: 26.53 KG/M2 | SYSTOLIC BLOOD PRESSURE: 130 MMHG | HEART RATE: 59 BPM | DIASTOLIC BLOOD PRESSURE: 80 MMHG

## 2018-04-30 DIAGNOSIS — Z86.79 OTHER SPECIFIED POSTPROCEDURAL STATES: ICD-10-CM

## 2018-04-30 DIAGNOSIS — Z98.890 OTHER SPECIFIED POSTPROCEDURAL STATES: ICD-10-CM

## 2018-04-30 PROCEDURE — 99214 OFFICE O/P EST MOD 30 MIN: CPT

## 2018-04-30 PROCEDURE — 93000 ELECTROCARDIOGRAM COMPLETE: CPT

## 2018-04-30 RX ORDER — APIXABAN 5 MG/1
5 TABLET, FILM COATED ORAL
Qty: 180 | Refills: 3 | Status: DISCONTINUED | COMMUNITY
Start: 2017-12-19 | End: 2018-04-30

## 2018-04-30 RX ORDER — APIXABAN 5 MG/1
5 TABLET, FILM COATED ORAL
Qty: 180 | Refills: 3 | Status: DISCONTINUED | COMMUNITY
End: 2018-04-30

## 2018-04-30 RX ORDER — METOPROLOL TARTRATE 25 MG/1
25 TABLET, FILM COATED ORAL TWICE DAILY
Qty: 60 | Refills: 6 | Status: DISCONTINUED | COMMUNITY
Start: 2017-12-19 | End: 2018-04-30

## 2018-06-20 ENCOUNTER — INPATIENT (INPATIENT)
Facility: HOSPITAL | Age: 58
LOS: 4 days | Discharge: HOME | End: 2018-06-25
Attending: INTERNAL MEDICINE | Admitting: INTERNAL MEDICINE
Payer: COMMERCIAL

## 2018-06-20 VITALS
HEART RATE: 81 BPM | SYSTOLIC BLOOD PRESSURE: 122 MMHG | WEIGHT: 173.72 LBS | TEMPERATURE: 103 F | OXYGEN SATURATION: 97 % | RESPIRATION RATE: 16 BRPM | DIASTOLIC BLOOD PRESSURE: 57 MMHG

## 2018-06-20 DIAGNOSIS — Z98.890 OTHER SPECIFIED POSTPROCEDURAL STATES: Chronic | ICD-10-CM

## 2018-06-20 PROCEDURE — 99285 EMERGENCY DEPT VISIT HI MDM: CPT

## 2018-06-20 PROCEDURE — 71045 X-RAY EXAM CHEST 1 VIEW: CPT | Mod: 26

## 2018-06-20 RX ORDER — PIPERACILLIN AND TAZOBACTAM 4; .5 G/20ML; G/20ML
3.38 INJECTION, POWDER, LYOPHILIZED, FOR SOLUTION INTRAVENOUS ONCE
Qty: 0 | Refills: 0 | Status: COMPLETED | OUTPATIENT
Start: 2018-06-20 | End: 2018-06-20

## 2018-06-20 RX ORDER — SODIUM CHLORIDE 9 MG/ML
1000 INJECTION INTRAMUSCULAR; INTRAVENOUS; SUBCUTANEOUS ONCE
Qty: 0 | Refills: 0 | Status: COMPLETED | OUTPATIENT
Start: 2018-06-20 | End: 2018-06-20

## 2018-06-20 RX ADMIN — PIPERACILLIN AND TAZOBACTAM 200 GRAM(S): 4; .5 INJECTION, POWDER, LYOPHILIZED, FOR SOLUTION INTRAVENOUS at 23:59

## 2018-06-20 NOTE — ED ADULT TRIAGE NOTE - CHIEF COMPLAINT QUOTE
Pt is here for a fever that started yesterday. Pt had a biopsy on his prostate on Monday, pt states the fever started yesterday during the day. Pt states he has been using tylenol for his fevers, but the fever has persisted. Pt states he is concerned because he had a valve replacement in 2016. Pt denies any SOB, chest pain, difficulty breathing.

## 2018-06-21 DIAGNOSIS — N17.9 ACUTE KIDNEY FAILURE, UNSPECIFIED: ICD-10-CM

## 2018-06-21 DIAGNOSIS — N39.0 URINARY TRACT INFECTION, SITE NOT SPECIFIED: ICD-10-CM

## 2018-06-21 DIAGNOSIS — N40.1 BENIGN PROSTATIC HYPERPLASIA WITH LOWER URINARY TRACT SYMPTOMS: ICD-10-CM

## 2018-06-21 DIAGNOSIS — Z95.2 PRESENCE OF PROSTHETIC HEART VALVE: ICD-10-CM

## 2018-06-21 LAB
ALBUMIN SERPL ELPH-MCNC: 3.2 G/DL — LOW (ref 3.3–5)
ALBUMIN SERPL ELPH-MCNC: 3.5 G/DL — SIGNIFICANT CHANGE UP (ref 3.3–5)
ALP SERPL-CCNC: 51 U/L — SIGNIFICANT CHANGE UP (ref 40–120)
ALP SERPL-CCNC: 60 U/L — SIGNIFICANT CHANGE UP (ref 40–120)
ALT FLD-CCNC: 39 U/L — SIGNIFICANT CHANGE UP (ref 12–78)
ALT FLD-CCNC: 56 U/L — SIGNIFICANT CHANGE UP (ref 12–78)
ANION GAP SERPL CALC-SCNC: 11 MMOL/L — SIGNIFICANT CHANGE UP (ref 5–17)
ANION GAP SERPL CALC-SCNC: 6 MMOL/L — SIGNIFICANT CHANGE UP (ref 5–17)
APPEARANCE UR: CLEAR — SIGNIFICANT CHANGE UP
APTT BLD: 29.7 SEC — SIGNIFICANT CHANGE UP (ref 27.5–37.4)
AST SERPL-CCNC: 40 U/L — HIGH (ref 15–37)
AST SERPL-CCNC: 56 U/L — HIGH (ref 15–37)
BASOPHILS # BLD AUTO: 0.01 K/UL — SIGNIFICANT CHANGE UP (ref 0–0.2)
BASOPHILS # BLD AUTO: 0.01 K/UL — SIGNIFICANT CHANGE UP (ref 0–0.2)
BASOPHILS NFR BLD AUTO: 0.1 % — SIGNIFICANT CHANGE UP (ref 0–2)
BASOPHILS NFR BLD AUTO: 0.1 % — SIGNIFICANT CHANGE UP (ref 0–2)
BILIRUB DIRECT SERPL-MCNC: 0.87 MG/DL — HIGH (ref 0.05–0.2)
BILIRUB INDIRECT FLD-MCNC: 4.1 MG/DL — HIGH (ref 0.2–1)
BILIRUB SERPL-MCNC: 5 MG/DL — HIGH (ref 0.2–1.2)
BILIRUB SERPL-MCNC: 5 MG/DL — HIGH (ref 0.2–1.2)
BILIRUB UR-MCNC: NEGATIVE — SIGNIFICANT CHANGE UP
BUN SERPL-MCNC: 12 MG/DL — SIGNIFICANT CHANGE UP (ref 7–23)
BUN SERPL-MCNC: 15 MG/DL — SIGNIFICANT CHANGE UP (ref 7–23)
CALCIUM SERPL-MCNC: 8.2 MG/DL — LOW (ref 8.5–10.1)
CALCIUM SERPL-MCNC: 8.2 MG/DL — LOW (ref 8.5–10.1)
CHLORIDE SERPL-SCNC: 101 MMOL/L — SIGNIFICANT CHANGE UP (ref 96–108)
CHLORIDE SERPL-SCNC: 95 MMOL/L — LOW (ref 96–108)
CK MB CFR SERPL CALC: 1.6 NG/ML — SIGNIFICANT CHANGE UP (ref 0.5–3.6)
CO2 SERPL-SCNC: 26 MMOL/L — SIGNIFICANT CHANGE UP (ref 22–31)
CO2 SERPL-SCNC: 30 MMOL/L — SIGNIFICANT CHANGE UP (ref 22–31)
COLOR SPEC: YELLOW — SIGNIFICANT CHANGE UP
CREAT SERPL-MCNC: 1.17 MG/DL — SIGNIFICANT CHANGE UP (ref 0.5–1.3)
CREAT SERPL-MCNC: 1.42 MG/DL — HIGH (ref 0.5–1.3)
DIFF PNL FLD: ABNORMAL
EOSINOPHIL # BLD AUTO: 0 K/UL — SIGNIFICANT CHANGE UP (ref 0–0.5)
EOSINOPHIL # BLD AUTO: 0 K/UL — SIGNIFICANT CHANGE UP (ref 0–0.5)
EOSINOPHIL NFR BLD AUTO: 0 % — SIGNIFICANT CHANGE UP (ref 0–6)
EOSINOPHIL NFR BLD AUTO: 0 % — SIGNIFICANT CHANGE UP (ref 0–6)
GLUCOSE SERPL-MCNC: 123 MG/DL — HIGH (ref 70–99)
GLUCOSE SERPL-MCNC: 135 MG/DL — HIGH (ref 70–99)
GLUCOSE UR QL: NEGATIVE MG/DL — SIGNIFICANT CHANGE UP
HAPTOGLOB SERPL-MCNC: 172 MG/DL — SIGNIFICANT CHANGE UP (ref 34–200)
HCT VFR BLD CALC: 37.2 % — LOW (ref 39–50)
HCT VFR BLD CALC: 38 % — LOW (ref 39–50)
HGB BLD-MCNC: 11.9 G/DL — LOW (ref 13–17)
HGB BLD-MCNC: 12.3 G/DL — LOW (ref 13–17)
IMM GRANULOCYTES NFR BLD AUTO: 0.5 % — SIGNIFICANT CHANGE UP (ref 0–1.5)
IMM GRANULOCYTES NFR BLD AUTO: 0.5 % — SIGNIFICANT CHANGE UP (ref 0–1.5)
INR BLD: 1.42 RATIO — HIGH (ref 0.88–1.16)
KETONES UR-MCNC: NEGATIVE — SIGNIFICANT CHANGE UP
LACTATE SERPL-SCNC: 0.7 MMOL/L — SIGNIFICANT CHANGE UP (ref 0.7–2)
LACTATE SERPL-SCNC: 2.5 MMOL/L — HIGH (ref 0.7–2)
LDH SERPL L TO P-CCNC: 248 U/L — HIGH (ref 50–242)
LEUKOCYTE ESTERASE UR-ACNC: ABNORMAL
LYMPHOCYTES # BLD AUTO: 0.68 K/UL — LOW (ref 1–3.3)
LYMPHOCYTES # BLD AUTO: 0.75 K/UL — LOW (ref 1–3.3)
LYMPHOCYTES # BLD AUTO: 5.8 % — LOW (ref 13–44)
LYMPHOCYTES # BLD AUTO: 7.4 % — LOW (ref 13–44)
MCHC RBC-ENTMCNC: 27.2 PG — SIGNIFICANT CHANGE UP (ref 27–34)
MCHC RBC-ENTMCNC: 27.2 PG — SIGNIFICANT CHANGE UP (ref 27–34)
MCHC RBC-ENTMCNC: 32 GM/DL — SIGNIFICANT CHANGE UP (ref 32–36)
MCHC RBC-ENTMCNC: 32.4 GM/DL — SIGNIFICANT CHANGE UP (ref 32–36)
MCV RBC AUTO: 83.9 FL — SIGNIFICANT CHANGE UP (ref 80–100)
MCV RBC AUTO: 84.9 FL — SIGNIFICANT CHANGE UP (ref 80–100)
MONOCYTES # BLD AUTO: 0.69 K/UL — SIGNIFICANT CHANGE UP (ref 0–0.9)
MONOCYTES # BLD AUTO: 0.89 K/UL — SIGNIFICANT CHANGE UP (ref 0–0.9)
MONOCYTES NFR BLD AUTO: 5.9 % — SIGNIFICANT CHANGE UP (ref 2–14)
MONOCYTES NFR BLD AUTO: 8.8 % — SIGNIFICANT CHANGE UP (ref 2–14)
NEUTROPHILS # BLD AUTO: 10.28 K/UL — HIGH (ref 1.8–7.4)
NEUTROPHILS # BLD AUTO: 8.45 K/UL — HIGH (ref 1.8–7.4)
NEUTROPHILS NFR BLD AUTO: 83.2 % — HIGH (ref 43–77)
NEUTROPHILS NFR BLD AUTO: 87.7 % — HIGH (ref 43–77)
NITRITE UR-MCNC: NEGATIVE — SIGNIFICANT CHANGE UP
NRBC # BLD: 0 /100 WBCS — SIGNIFICANT CHANGE UP (ref 0–0)
PH UR: 6 — SIGNIFICANT CHANGE UP (ref 5–8)
PLATELET # BLD AUTO: 101 K/UL — LOW (ref 150–400)
PLATELET # BLD AUTO: 114 K/UL — LOW (ref 150–400)
POTASSIUM SERPL-MCNC: 3 MMOL/L — LOW (ref 3.5–5.3)
POTASSIUM SERPL-MCNC: 3.8 MMOL/L — SIGNIFICANT CHANGE UP (ref 3.5–5.3)
POTASSIUM SERPL-SCNC: 3 MMOL/L — LOW (ref 3.5–5.3)
POTASSIUM SERPL-SCNC: 3.8 MMOL/L — SIGNIFICANT CHANGE UP (ref 3.5–5.3)
PROT SERPL-MCNC: 7.1 GM/DL — SIGNIFICANT CHANGE UP (ref 6–8.3)
PROT SERPL-MCNC: 7.6 GM/DL — SIGNIFICANT CHANGE UP (ref 6–8.3)
PROT UR-MCNC: 30 MG/DL
PROTHROM AB SERPL-ACNC: 15.6 SEC — HIGH (ref 9.8–12.7)
RBC # BLD: 4.38 M/UL — SIGNIFICANT CHANGE UP (ref 4.2–5.8)
RBC # BLD: 4.41 M/UL — SIGNIFICANT CHANGE UP (ref 4.2–5.8)
RBC # BLD: 4.53 M/UL — SIGNIFICANT CHANGE UP (ref 4.2–5.8)
RBC # FLD: 14.6 % — HIGH (ref 10.3–14.5)
RBC # FLD: 14.7 % — HIGH (ref 10.3–14.5)
RETICS #: 46.3 K/UL — SIGNIFICANT CHANGE UP (ref 25–125)
RETICS/RBC NFR: 1.1 % — SIGNIFICANT CHANGE UP (ref 0.5–2.5)
SODIUM SERPL-SCNC: 132 MMOL/L — LOW (ref 135–145)
SODIUM SERPL-SCNC: 137 MMOL/L — SIGNIFICANT CHANGE UP (ref 135–145)
SP GR SPEC: 1.01 — SIGNIFICANT CHANGE UP (ref 1.01–1.02)
TROPONIN I SERPL-MCNC: 0.03 NG/ML — SIGNIFICANT CHANGE UP (ref 0.01–0.04)
UROBILINOGEN FLD QL: NEGATIVE MG/DL — SIGNIFICANT CHANGE UP
WBC # BLD: 10.15 K/UL — SIGNIFICANT CHANGE UP (ref 3.8–10.5)
WBC # BLD: 11.72 K/UL — HIGH (ref 3.8–10.5)
WBC # FLD AUTO: 10.15 K/UL — SIGNIFICANT CHANGE UP (ref 3.8–10.5)
WBC # FLD AUTO: 11.72 K/UL — HIGH (ref 3.8–10.5)

## 2018-06-21 PROCEDURE — 76700 US EXAM ABDOM COMPLETE: CPT | Mod: 26

## 2018-06-21 PROCEDURE — 99223 1ST HOSP IP/OBS HIGH 75: CPT

## 2018-06-21 PROCEDURE — 12345: CPT | Mod: NC

## 2018-06-21 RX ORDER — TAMSULOSIN HYDROCHLORIDE 0.4 MG/1
0.4 CAPSULE ORAL AT BEDTIME
Qty: 0 | Refills: 0 | Status: DISCONTINUED | OUTPATIENT
Start: 2018-06-21 | End: 2018-06-25

## 2018-06-21 RX ORDER — FINASTERIDE 5 MG/1
5 TABLET, FILM COATED ORAL DAILY
Qty: 0 | Refills: 0 | Status: DISCONTINUED | OUTPATIENT
Start: 2018-06-21 | End: 2018-06-25

## 2018-06-21 RX ORDER — LIDOCAINE HCL 20 MG/ML
10 VIAL (ML) INJECTION ONCE
Qty: 0 | Refills: 0 | Status: COMPLETED | OUTPATIENT
Start: 2018-06-21 | End: 2018-06-21

## 2018-06-21 RX ORDER — POTASSIUM CHLORIDE 20 MEQ
40 PACKET (EA) ORAL ONCE
Qty: 0 | Refills: 0 | Status: COMPLETED | OUTPATIENT
Start: 2018-06-21 | End: 2018-06-21

## 2018-06-21 RX ORDER — PANTOPRAZOLE SODIUM 20 MG/1
40 TABLET, DELAYED RELEASE ORAL
Qty: 0 | Refills: 0 | Status: DISCONTINUED | OUTPATIENT
Start: 2018-06-21 | End: 2018-06-25

## 2018-06-21 RX ORDER — SENNA PLUS 8.6 MG/1
2 TABLET ORAL AT BEDTIME
Qty: 0 | Refills: 0 | Status: DISCONTINUED | OUTPATIENT
Start: 2018-06-21 | End: 2018-06-25

## 2018-06-21 RX ORDER — SODIUM CHLORIDE 9 MG/ML
3 INJECTION INTRAMUSCULAR; INTRAVENOUS; SUBCUTANEOUS ONCE
Qty: 0 | Refills: 0 | Status: COMPLETED | OUTPATIENT
Start: 2018-06-21 | End: 2018-06-21

## 2018-06-21 RX ORDER — METOPROLOL TARTRATE 50 MG
25 TABLET ORAL
Qty: 0 | Refills: 0 | Status: DISCONTINUED | OUTPATIENT
Start: 2018-06-21 | End: 2018-06-25

## 2018-06-21 RX ORDER — ATORVASTATIN CALCIUM 80 MG/1
20 TABLET, FILM COATED ORAL AT BEDTIME
Qty: 0 | Refills: 0 | Status: DISCONTINUED | OUTPATIENT
Start: 2018-06-21 | End: 2018-06-25

## 2018-06-21 RX ORDER — PIPERACILLIN AND TAZOBACTAM 4; .5 G/20ML; G/20ML
3.38 INJECTION, POWDER, LYOPHILIZED, FOR SOLUTION INTRAVENOUS EVERY 8 HOURS
Qty: 0 | Refills: 0 | Status: DISCONTINUED | OUTPATIENT
Start: 2018-06-21 | End: 2018-06-25

## 2018-06-21 RX ORDER — SODIUM CHLORIDE 9 MG/ML
1000 INJECTION, SOLUTION INTRAVENOUS
Qty: 0 | Refills: 0 | Status: DISCONTINUED | OUTPATIENT
Start: 2018-06-21 | End: 2018-06-25

## 2018-06-21 RX ORDER — ACETAMINOPHEN 500 MG
650 TABLET ORAL EVERY 6 HOURS
Qty: 0 | Refills: 0 | Status: DISCONTINUED | OUTPATIENT
Start: 2018-06-21 | End: 2018-06-25

## 2018-06-21 RX ORDER — ACETAMINOPHEN 500 MG
650 TABLET ORAL ONCE
Qty: 0 | Refills: 0 | Status: COMPLETED | OUTPATIENT
Start: 2018-06-21 | End: 2018-06-21

## 2018-06-21 RX ORDER — DOCUSATE SODIUM 100 MG
100 CAPSULE ORAL
Qty: 0 | Refills: 0 | Status: DISCONTINUED | OUTPATIENT
Start: 2018-06-21 | End: 2018-06-25

## 2018-06-21 RX ADMIN — SODIUM CHLORIDE 2000 MILLILITER(S): 9 INJECTION INTRAMUSCULAR; INTRAVENOUS; SUBCUTANEOUS at 00:05

## 2018-06-21 RX ADMIN — SODIUM CHLORIDE 3 MILLILITER(S): 9 INJECTION INTRAMUSCULAR; INTRAVENOUS; SUBCUTANEOUS at 02:39

## 2018-06-21 RX ADMIN — Medication 25 MILLIGRAM(S): at 17:36

## 2018-06-21 RX ADMIN — TAMSULOSIN HYDROCHLORIDE 0.4 MILLIGRAM(S): 0.4 CAPSULE ORAL at 21:10

## 2018-06-21 RX ADMIN — Medication 650 MILLIGRAM(S): at 17:36

## 2018-06-21 RX ADMIN — Medication 10 MILLILITER(S): at 02:40

## 2018-06-21 RX ADMIN — PIPERACILLIN AND TAZOBACTAM 25 GRAM(S): 4; .5 INJECTION, POWDER, LYOPHILIZED, FOR SOLUTION INTRAVENOUS at 21:09

## 2018-06-21 RX ADMIN — PIPERACILLIN AND TAZOBACTAM 25 GRAM(S): 4; .5 INJECTION, POWDER, LYOPHILIZED, FOR SOLUTION INTRAVENOUS at 13:43

## 2018-06-21 RX ADMIN — Medication 40 MILLIEQUIVALENT(S): at 03:30

## 2018-06-21 RX ADMIN — PANTOPRAZOLE SODIUM 40 MILLIGRAM(S): 20 TABLET, DELAYED RELEASE ORAL at 07:59

## 2018-06-21 RX ADMIN — Medication 650 MILLIGRAM(S): at 02:29

## 2018-06-21 RX ADMIN — SODIUM CHLORIDE 100 MILLILITER(S): 9 INJECTION, SOLUTION INTRAVENOUS at 06:50

## 2018-06-21 RX ADMIN — PIPERACILLIN AND TAZOBACTAM 25 GRAM(S): 4; .5 INJECTION, POWDER, LYOPHILIZED, FOR SOLUTION INTRAVENOUS at 06:44

## 2018-06-21 RX ADMIN — FINASTERIDE 5 MILLIGRAM(S): 5 TABLET, FILM COATED ORAL at 11:46

## 2018-06-21 RX ADMIN — ATORVASTATIN CALCIUM 20 MILLIGRAM(S): 80 TABLET, FILM COATED ORAL at 21:10

## 2018-06-21 RX ADMIN — Medication 100 MILLIGRAM(S): at 19:38

## 2018-06-21 NOTE — CONSULT NOTE ADULT - SUBJECTIVE AND OBJECTIVE BOX
HPI:  57 y/o male PMH bph, htn, ?afib c/o fever for 2 days after prostate biopsy 3 days ago. Biopsy for enlarged prostate, doesn't know results. Pt with dysuria/frequency, has decreased volume urination with hematuria. No back pain, no cp, palpitations, sob, abd/ flank pain, photophobia or cough. Taking tylenol at home, started on levofloxacin by PMD has taken 4 days.. (2018 02:40)    Has h/o LUTS with BPH on Flomax and Finasteride.  PSA was around 9      PAST MEDICAL & SURGICAL HISTORY:  Hernia, femoral  Atrial fibrillation  BPH (benign prostatic hyperplasia)  Mitral insufficiency  Systolic CHF, acute on chronic  H/O tricuspid valve repair  H/O mitral valve repair  cardiac ablation procedure  TRUS/NBP 2018      Allergies    No Known Allergies          SOCIAL HISTORY  FAMILY HISTORY:  No pertinent family history in first degree relatives      Home Medications:  atorvastatin 20 mg oral tablet: 1 tab(s) orally once a day (at bedtime) (2018 04:24)  finasteride 5 mg oral tablet: 1 tab(s) orally once a day (2018 04:24)  influenza virus vaccine, inactivated:  (2018 04:24)  Levaquin 500 mg oral tablet: 1 tab(s) orally every 24 hours  (2018 04:26)  metoprolol tartrate 25 mg oral tablet: 1 tab(s) orally 2 times a day (2018 04:26)  Metoprolol Tartrate 50 mg oral tablet: 1 tab(s) orally 2 times a day (2018 04:26)  pantoprazole 40 mg oral delayed release tablet: 1 tab(s) orally once a day (before a meal) (2018 04:26)  tamsulosin 0.4 mg oral capsule: 1 cap(s) orally once a day (at bedtime) (2018 04:24)      MEDICATIONS  (STANDING):  atorvastatin 20 milliGRAM(s) Oral at bedtime  docusate sodium 100 milliGRAM(s) Oral two times a day  finasteride 5 milliGRAM(s) Oral daily  lactated ringers. 1000 milliLiter(s) (100 mL/Hr) IV Continuous <Continuous>  metoprolol tartrate 25 milliGRAM(s) Oral two times a day  pantoprazole    Tablet 40 milliGRAM(s) Oral before breakfast  piperacillin/tazobactam IVPB. 3.375 Gram(s) IV Intermittent every 8 hours  senna 2 Tablet(s) Oral at bedtime  tamsulosin 0.4 milliGRAM(s) Oral at bedtime    MEDICATIONS  (PRN):  acetaminophen   Tablet 650 milliGRAM(s) Oral every 6 hours PRN For Temp greater than 38 C (100.4 F)      ROS:    General:  No wt loss, has  fevers and chills,   ENT:  No sore throat, pain, runny nose,   CV:  No pain, palpitatioins,  Resp:  No dyspnea, cough, tachypnea, wheezing  GI:  No pain, nausea, vomiting, diarrhea, constipatiion  :  difficulty voiding, urinary retention  Neuro:  No weakness, tingling,   Endocrine:  No polyuria, polydypsia, cold/heat intolerance  Skin:  No rash,  edema      Physical Exam:    Vital Signs:  Vital Signs Last 24 Hrs  T(C): 38.3 (2018 16:28), Max: 39.4 (2018 22:05)  T(F): 101 (2018 16:28), Max: 102.9 (2018 22:05)  HR: 68 (2018 05:29) (68 - 81)  BP: 133/65 (2018 16:28) (103/59 - 133/65)  BP(mean): --  RR: 18 (2018 16:28) (16 - 18)  SpO2: 99% (2018 16:28) (97% - 99%)  Daily Height in cm: 170.18 (2018 04:11)    Daily Weight in k.7 (2018 05:29)  I&O's Summary    2018 07:  -  2018 07:00  --------------------------------------------------------  IN: 100 mL / OUT: 420 mL / NET: -320 mL    2018 07:  -  2018 19:27  --------------------------------------------------------  IN: 460 mL / OUT: 2250 mL / NET: -1790 mL        General:  Appears stated age,  well-nourished, no distress, has fever  HEENT:  NC/AT, patent nares w/ pink mucosa, OP moist and pink,   conjunctivae clear  Chest:  Full & symmetric excursion, no increased effort.   Abdomen:  Soft, non-tender, non-distended, normoactive bowel sounds.  Genitalia: Circumcised Phallus, Adequate meatus, no hypospadias. Both testicles descended, non tender, no mass. No epididymitis or epididymal mass. No hydrocele. Segura catheter draining clear urine.  Rectal Examination: Deferred.  Extremities:  no edema, pedal pusation are present, no calf tenderness.  Skin:  No rash/erythema  Neuro/Psych:  Alert and conscious. Grossly intact and symmetrical.      LABS:                        11.9   10.15 )-----------( 101      ( 2018 11:57 )             37.2         137  |  101  |  12  ----------------------------<  135<H>  3.8   |  30  |  1.17    Ca    8.2<L>      2018 11:57    TPro  7.1  /  Alb  3.2<L>  /  TBili  5.0<H>  /  DBili  .87<H>  /  AST  56<H>  /  ALT  56  /  AlkPhos  60      PT/INR - ( 2018 07:31 )   PT: 15.6 sec;   INR: 1.42 ratio         PTT - ( 2018 07:31 )  PTT:29.7 sec  Urinalysis Basic - ( 2018 01:30 )    Color: Yellow / Appearance: Clear / S.010 / pH: x  Gluc: x / Ketone: Negative  / Bili: Negative / Urobili: Negative mg/dL   Blood: x / Protein: 30 mg/dL / Nitrite: Negative   Leuk Esterase: Small / RBC: 25-50 /HPF / WBC 26-50   Sq Epi: x / Non Sq Epi: x / Bacteria: Moderate          RADIOLOGY & ADDITIONAL STUDIES:    < from: US Abdomen Complete (18 @ 10:14) >  EXAM:  US ABDOMINAL COMPLETE                            PROCEDURE DATE:  2018          INTERPRETATION:  CLINICAL INFORMATION: UTI, increasing bilirubin    COMPARISON: None available.    TECHNIQUE: Sonography of the abdomen.     FINDINGS:    Liver: Within normal limits.    Bile ducts: Normal caliber. Common bile duct measures 5 mm.     Gallbladder: Within normal limits.        Pancreas: Visualized portions are within normal limits.    Spleen: 10.3 cm. Within normal limits.    Right kidney:10.4 cm. No hydronephrosis. There is a 2 cm cyst in the   interpolar region.    Left kidney: 10.4 cm. cm.  No hydronephrosis , however there is mild   prominence of the renal pelvis.    Ascites: None.    Aorta and IVC: Visualized portions are within normal limits.    IMPRESSION:     Mild prominence of the left renal pelvis.                AMY CHAU M.D., ATTENDING RADIOLOGIST  This document has been electronically signed. 2018 10:25AM

## 2018-06-21 NOTE — CONSULT NOTE ADULT - ASSESSMENT
sepsis   s/p TRUS/NBP:  Continue antibiotics, await urine c/s  Urinary retention :  continue Segura catheter  Left hydronephrosis:  CT scan to evaluate kidneys

## 2018-06-21 NOTE — H&P ADULT - NSHPREVIEWOFSYSTEMS_GEN_ALL_CORE
REVIEW OF SYSTEMS:  CONSTITUTIONAL: + fever, no weight loss, or fatigue  EYES: No eye pain, visual disturbances, or discharge  ENMT:  No difficulty hearing, tinnitus, vertigo; No sinus or throat pain  NECK: No pain or stiffness  RESPIRATORY: No cough, wheezing, chills or hemoptysis; No shortness of breath  CARDIOVASCULAR: No chest pain, palpitations, dizziness, or leg swelling  GASTROINTESTINAL: No abdominal or epigastric pain. No nausea, vomiting, or hematemesis; No diarrhea or constipation. No melena or hematochezia.  GENITOURINARY: +dysuria, frequency, hematuria, no incontinence  NEUROLOGICAL: No headaches, memory loss, loss of strength, numbness, or tremors  SKIN: No itching, burning, rashes, or lesions   LYMPH NODES: No enlarged glands  ENDOCRINE: No heat or cold intolerance; No hair loss  MUSCULOSKELETAL: No joint pain or swelling; No muscle, back, or extremity pain  PSYCHIATRIC: No depression, anxiety, mood swings, or difficulty sleeping  HEME/LYMPH: No easy bruising, or bleeding gums  ALLERGY AND IMMUNOLOGIC: No hives or eczema

## 2018-06-21 NOTE — ED PROVIDER NOTE - OBJECTIVE STATEMENT
57 y/o male hx bph, htn, ?afib c/o fever for 2 days after prostate biopsy 3 days ago. Biopsy for enlarged prostate, doesn't know results. Pt with dysuria/frequency. No back pain, no cp, sob, abd pain, photophobia or cough. Taking tylenol at home.    ROS: No eye pain/changes in vision, No ear pain/sore throat/dysphagia, No chest pain/palpitations. No SOB/cough/. No abdominal pain, N/V/D, no black/bloody bm. No headache. No Dizziness.    No rashes or breaks in skin. No numbness/tingling/weakness.

## 2018-06-21 NOTE — H&P ADULT - HISTORY OF PRESENT ILLNESS
59 y/o male PMH bph, htn, ?afib c/o fever for 2 days after prostate biopsy 3 days ago. Biopsy for enlarged prostate, doesn't know results. Pt with dysuria/frequency, has decreased volume urination with hematuria. No back pain, no cp, palpitations, sob, abd/ flank pain, photophobia or cough. Taking tylenol at home, started on levofloxacin by PMD has taken 4 days..

## 2018-06-21 NOTE — PATIENT PROFILE ADULT. - FUNCTIONAL SCREEN CURRENT LEVEL: COMMUNICATION, MLM
Patient was playing basketball Someone fell with their hand onto his right thumb. Unsure if it bent. C/o pain and swelling over base of thumb. No other injury (0) understands/communicates without difficulty

## 2018-06-21 NOTE — CHART NOTE - NSCHARTNOTEFT_GEN_A_CORE
59 y/o male PMH bph, htn, ?afib c/o fever for 2 days after prostate biopsy 3 days ago. Biopsy for enlarged prostate, doesn't know results. Please see H&P. Pt seen and examined at bedside. cont same mgmt, f/u Urology

## 2018-06-21 NOTE — H&P ADULT - NSHPLABSRESULTS_GEN_ALL_CORE
LABS:                        12.3   11.72 )-----------( 114      ( 2018 01:07 )             38.0         132<L>  |  95<L>  |  15  ----------------------------<  123<H>  3.0<L>   |  26  |  1.42<H>    Ca    8.2<L>      2018 01:07    TPro  7.6  /  Alb  3.5  /  TBili  5.0<H>  /  DBili  x   /  AST  40<H>  /  ALT  39  /  AlkPhos  51  -21      Urinalysis Basic - ( 2018 01:30 )    Color: Yellow / Appearance: Clear / S.010 / pH: x  Gluc: x / Ketone: Negative  / Bili: Negative / Urobili: Negative mg/dL   Blood: x / Protein: 30 mg/dL / Nitrite: Negative   Leuk Esterase: Small / RBC: 25-50 /HPF / WBC 26-50   Sq Epi: x / Non Sq Epi: x / Bacteria: Moderate      CAPILLARY BLOOD GLUCOSE  Lactate, Blood: 2.5 mmol/L (. @ 01:07)            RADIOLOGY & ADDITIONAL TESTS:  CXR: mitral valve replacement, no infiltrate  Imaging Personally Reviewed:  [ x] YES  [ ] NO  EKG: pending

## 2018-06-21 NOTE — ED PROVIDER NOTE - MEDICAL DECISION MAKING DETAILS
fever likely related to prostate biopsy given symptoms. HD stable, abx, Dr. Hansen from urology aware, admitted to Dr. Pulliam.

## 2018-06-21 NOTE — H&P ADULT - ASSESSMENT
59 y/o male PMH BPH, htn, ?afib c/o fever for 2 days after prostate biopsy 3 days ago, developed fever, started on levofloxacin by PMD, had decreased urinary output, told to come to ED.  Has fever, leukocytosis, urinary obstruction on exam, UTI, needs kinney, renal ultrasound. ED physician discussed case with Dr Hansen, urology who will see patient.  IMPROVE VTE Individual Risk Assessment          RISK                                                          Points    [  ] Previous VTE                                                3    [  ] Thrombophilia                                             2    [  ] Lower limb paralysis                                    2        (unable to hold up >15 seconds)      [  ] Current Cancer                                             2         (within 6 months)    [  ] Immobilization > 24 hrs                              1    [  ] ICU/CCU stay > 24 hours                            1    [  ] Age > 60                                                    1    IMPROVE VTE Score __0_______

## 2018-06-21 NOTE — H&P ADULT - NSHPPHYSICALEXAM_GEN_ALL_CORE
T(C): 38.4 (20 Jun 2018 23:50), Max: 39.4 (20 Jun 2018 22:05)  T(F): 101.2 (20 Jun 2018 23:50), Max: 102.9 (20 Jun 2018 22:05)  HR: 79 (20 Jun 2018 23:50) (79 - 81)  BP: 126/61 (20 Jun 2018 23:50) (122/57 - 126/61)  BP(mean): --  RR: 17 (20 Jun 2018 23:50) (16 - 17)  SpO2: 98% (20 Jun 2018 23:50) (97% - 98%)    PHYSICAL EXAM:  GENERAL: NAD, well-groomed, well-developed  HEAD:  Atraumatic, Normocephalic  EYES: EOMI, PERRLA, conjunctiva and sclera clear  ENMT: No tonsillar erythema, exudates, or enlargement; Moist mucous membranes, Good dentition, No lesions  NECK: Supple, No JVD, Normal thyroid  NERVOUS SYSTEM:  Alert & Oriented X3, Good concentration; Motor Strength 5/5 B/L upper and lower extremities; DTRs 2+ intact and symmetric  CHEST/LUNG: Clear to percussion bilaterally; No rales, rhonchi, wheezing, or rubs  HEART: Regular rate and rhythm; No murmurs, rubs, or gallops  ABDOMEN: Soft, suprapubic tenderness, Nondistended; Bowel sounds present, no CVA tenderness  EXTREMITIES + Peripheral Pulses, No clubbing, cyanosis, or edema  LYMPH: No lymphadenopathy noted  SKIN: No rashes or lesions

## 2018-06-22 DIAGNOSIS — Z29.9 ENCOUNTER FOR PROPHYLACTIC MEASURES, UNSPECIFIED: ICD-10-CM

## 2018-06-22 LAB
ANION GAP SERPL CALC-SCNC: 10 MMOL/L — SIGNIFICANT CHANGE UP (ref 5–17)
BUN SERPL-MCNC: 13 MG/DL — SIGNIFICANT CHANGE UP (ref 7–23)
CALCIUM SERPL-MCNC: 8.2 MG/DL — LOW (ref 8.5–10.1)
CHLORIDE SERPL-SCNC: 104 MMOL/L — SIGNIFICANT CHANGE UP (ref 96–108)
CO2 SERPL-SCNC: 25 MMOL/L — SIGNIFICANT CHANGE UP (ref 22–31)
CREAT SERPL-MCNC: 1.17 MG/DL — SIGNIFICANT CHANGE UP (ref 0.5–1.3)
CULTURE RESULTS: SIGNIFICANT CHANGE UP
GLUCOSE SERPL-MCNC: 105 MG/DL — HIGH (ref 70–99)
HCT VFR BLD CALC: 33.7 % — LOW (ref 39–50)
HGB BLD-MCNC: 11.3 G/DL — LOW (ref 13–17)
MCHC RBC-ENTMCNC: 28 PG — SIGNIFICANT CHANGE UP (ref 27–34)
MCHC RBC-ENTMCNC: 33.5 GM/DL — SIGNIFICANT CHANGE UP (ref 32–36)
MCV RBC AUTO: 83.4 FL — SIGNIFICANT CHANGE UP (ref 80–100)
NRBC # BLD: 0 /100 WBCS — SIGNIFICANT CHANGE UP (ref 0–0)
PLATELET # BLD AUTO: 100 K/UL — LOW (ref 150–400)
POTASSIUM SERPL-MCNC: 3.4 MMOL/L — LOW (ref 3.5–5.3)
POTASSIUM SERPL-SCNC: 3.4 MMOL/L — LOW (ref 3.5–5.3)
RBC # BLD: 4.04 M/UL — LOW (ref 4.2–5.8)
RBC # FLD: 14.6 % — HIGH (ref 10.3–14.5)
SODIUM SERPL-SCNC: 139 MMOL/L — SIGNIFICANT CHANGE UP (ref 135–145)
SPECIMEN SOURCE: SIGNIFICANT CHANGE UP
WBC # BLD: 8.45 K/UL — SIGNIFICANT CHANGE UP (ref 3.8–10.5)
WBC # FLD AUTO: 8.45 K/UL — SIGNIFICANT CHANGE UP (ref 3.8–10.5)

## 2018-06-22 PROCEDURE — 99233 SBSQ HOSP IP/OBS HIGH 50: CPT

## 2018-06-22 PROCEDURE — 74176 CT ABD & PELVIS W/O CONTRAST: CPT | Mod: 26

## 2018-06-22 RX ORDER — POTASSIUM CHLORIDE 20 MEQ
40 PACKET (EA) ORAL ONCE
Qty: 0 | Refills: 0 | Status: COMPLETED | OUTPATIENT
Start: 2018-06-22 | End: 2018-06-22

## 2018-06-22 RX ADMIN — PANTOPRAZOLE SODIUM 40 MILLIGRAM(S): 20 TABLET, DELAYED RELEASE ORAL at 08:31

## 2018-06-22 RX ADMIN — Medication 25 MILLIGRAM(S): at 05:15

## 2018-06-22 RX ADMIN — PIPERACILLIN AND TAZOBACTAM 25 GRAM(S): 4; .5 INJECTION, POWDER, LYOPHILIZED, FOR SOLUTION INTRAVENOUS at 22:53

## 2018-06-22 RX ADMIN — Medication 100 MILLIGRAM(S): at 05:15

## 2018-06-22 RX ADMIN — ATORVASTATIN CALCIUM 20 MILLIGRAM(S): 80 TABLET, FILM COATED ORAL at 22:53

## 2018-06-22 RX ADMIN — Medication 650 MILLIGRAM(S): at 05:10

## 2018-06-22 RX ADMIN — Medication 25 MILLIGRAM(S): at 17:34

## 2018-06-22 RX ADMIN — Medication 40 MILLIEQUIVALENT(S): at 09:56

## 2018-06-22 RX ADMIN — PIPERACILLIN AND TAZOBACTAM 25 GRAM(S): 4; .5 INJECTION, POWDER, LYOPHILIZED, FOR SOLUTION INTRAVENOUS at 05:12

## 2018-06-22 RX ADMIN — SODIUM CHLORIDE 100 MILLILITER(S): 9 INJECTION, SOLUTION INTRAVENOUS at 22:59

## 2018-06-22 RX ADMIN — PIPERACILLIN AND TAZOBACTAM 25 GRAM(S): 4; .5 INJECTION, POWDER, LYOPHILIZED, FOR SOLUTION INTRAVENOUS at 13:58

## 2018-06-22 RX ADMIN — SENNA PLUS 2 TABLET(S): 8.6 TABLET ORAL at 22:53

## 2018-06-22 RX ADMIN — TAMSULOSIN HYDROCHLORIDE 0.4 MILLIGRAM(S): 0.4 CAPSULE ORAL at 22:53

## 2018-06-22 RX ADMIN — Medication 100 MILLIGRAM(S): at 17:34

## 2018-06-22 RX ADMIN — Medication 650 MILLIGRAM(S): at 15:45

## 2018-06-22 RX ADMIN — FINASTERIDE 5 MILLIGRAM(S): 5 TABLET, FILM COATED ORAL at 13:52

## 2018-06-22 NOTE — PROGRESS NOTE ADULT - SUBJECTIVE AND OBJECTIVE BOX
Patient is a 58y old  Male who presents with a chief complaint of admitted due to fever, UTI (2018 04:17)      INTERVAL HPI/ OVERNIGHT EVENTS: Pt was seen and examined at bedside today, The patient had fevers overnight, kinney in place and gross hematuria resolved on examination, this morning afebrile, no abdominal pain or other complaints.       MEDICATIONS  (STANDING):  atorvastatin 20 milliGRAM(s) Oral at bedtime  docusate sodium 100 milliGRAM(s) Oral two times a day  finasteride 5 milliGRAM(s) Oral daily  lactated ringers. 1000 milliLiter(s) (100 mL/Hr) IV Continuous <Continuous>  metoprolol tartrate 25 milliGRAM(s) Oral two times a day  pantoprazole    Tablet 40 milliGRAM(s) Oral before breakfast  piperacillin/tazobactam IVPB. 3.375 Gram(s) IV Intermittent every 8 hours  senna 2 Tablet(s) Oral at bedtime  tamsulosin 0.4 milliGRAM(s) Oral at bedtime    MEDICATIONS  (PRN):  acetaminophen   Tablet 650 milliGRAM(s) Oral every 6 hours PRN For Temp greater than 38 C (100.4 F)      Allergies    No Known Allergies    Intolerances        REVIEW OF SYSTEMS:    Unable to examine due to [ ] Altered Mental Status [ ] Advanced Dementia [ ] Expressive Aphasia [ ] Non-verbal patient    CONSTITUTIONAL: + fever, NO generalized weakness/Fatigue, No weight loss  EYES: No eye pain, visual disturbances, or discharge  ENMT:  No difficulty hearing, tinnitus, vertigo; No sinus or throat pain  NECK: No pain or stiffness  RESPIRATORY: No shortness of breath,  cough, wheezing, sputum or hemoptysis   CARDIOVASCULAR: No chest pain, palpitations, or leg swelling  GASTROINTESTINAL: No abdominal pain. No nausea, vomiting, diarrhea or constipation. No melena or hematochezia.  GENITOURINARY: kinney in place, No dysuria, frequency, hematuria, or incontinence  NEUROLOGICAL: No headaches, Dizziness, memory loss, loss of strength, numbness, or tremors  SKIN: No itching, burning, rashes, or lesions   MUSCULOSKELETAL: No joint pain or swelling; No muscle, back, or extremity pain  PSYCHIATRIC: No depression, anxiety, mood swings, or difficulty sleeping  HEME/LYMPH: No easy bruising, or bleeding gums      Vital Signs Last 24 Hrs  T(C): 36.7 (2018 11:57), Max: 39.1 (2018 05:56)  T(F): 98 (2018 11:57), Max: 102.4 (2018 05:56)  HR: 62 (2018 11:57) (62 - 79)  BP: 121/71 (2018 11:57) (121/71 - 135/76)  BP(mean): --  RR: 18 (2018 11:57) (18 - 18)  SpO2: 99% (2018 11:57) (98% - 99%)    PHYSICAL EXAM:  GENERAL: NAD, well-developed, well-groomed  HEAD:  Atraumatic, Normocephalic  EYES: conjunctiva and sclera clear  ENMT: Moist mucous membranes  NECK: Supple, No JVD, Normal thyroid  CHEST/LUNG: Clear to Auscultation bilaterally; No rales, rhonchi, wheezing, or rubs  HEART: Regular rate and rhythm; No murmurs, rubs, or gallops  ABDOMEN: Soft, Nontender, Nondistended; Bowel sounds present  EXTREMITIES:  2+ Peripheral Pulses, No clubbing, cyanosis, or edema  SKIN: No rashes or lesions  NERVOUS SYSTEM:  Alert & Oriented X3, Good concentration; Motor Strength 5/5 B/L upper and lower extremities    LABS:                        11.3   8.45  )-----------( 100      ( 2018 07:29 )             33.7     06-22    139  |  104  |  13  ----------------------------<  105<H>  3.4<L>   |  25  |  1.17    Ca    8.2<L>      2018 07:29    TPro  7.1  /  Alb  3.2<L>  /  TBili  5.0<H>  /  DBili  .87<H>  /  AST  56<H>  /  ALT  56  /  AlkPhos  60  06-21    PT/INR - ( 2018 07:31 )   PT: 15.6 sec;   INR: 1.42 ratio         PTT - ( 2018 07:31 )  PTT:29.7 sec  Urinalysis Basic - ( 2018 01:30 )    Color: Yellow / Appearance: Clear / S.010 / pH: x  Gluc: x / Ketone: Negative  / Bili: Negative / Urobili: Negative mg/dL   Blood: x / Protein: 30 mg/dL / Nitrite: Negative   Leuk Esterase: Small / RBC: 25-50 /HPF / WBC 26-50   Sq Epi: x / Non Sq Epi: x / Bacteria: Moderate      CAPILLARY BLOOD GLUCOSE            Culture - Blood (collected 18)  Source: .Blood Blood-Peripheral  Preliminary Report (18):    No growth to date.    Culture - Blood (collected 18)  Source: .Blood Blood-Venous  Preliminary Report (18):    No growth to date.    Culture - Urine (collected 18)  Source: .Urine Clean Catch (Midstream)  Final Report (18):    <10,000 CFU/ml Normal Urogenital jono present        RADIOLOGY & ADDITIONAL TESTS:          Imaging Personally Reviewed:  [ ] YES  [ ] NO    Consultant(s) Notes Reviewed:  [ ] YES  [ ] NO    Care Discussed with Consultants/Other Providers [x ] YES  [ ] NO

## 2018-06-22 NOTE — PROGRESS NOTE ADULT - SUBJECTIVE AND OBJECTIVE BOX
Patient seen and examined bedside resting comfortably.  Segura catheter draining well.    T(F): 101 (06-22-18 @ 18:39), Max: 102.4 (06-22-18 @ 05:56)  HR: 64 (06-22-18 @ 18:39) (62 - 79)  BP: 111/64 (06-22-18 @ 18:39) (111/64 - 135/76)  RR: 19 (06-22-18 @ 18:39) (18 - 19)  SpO2: 95% (06-22-18 @ 18:39) (95% - 99%)        PHYSICAL EXAM:  General: NAD, WDWN  Neuro:  Alert & conscious  HEENT: NCAT, EOMI, conjunctiva clear  Lung: respirations nonlabored, good inspiratory effort  Abdomen: soft, NTND.   Extremities: no pedal edema or calf tenderness noted   : uncircumcised phallus, adequate meatus.     LABS:                        11.3   8.45  )-----------( 100      ( 22 Jun 2018 07:29 )             33.7     06-22    139  |  104  |  13  ----------------------------<  105<H>  3.4<L>   |  25  |  1.17    Ca    8.2<L>      22 Jun 2018 07:29    TPro  7.1  /  Alb  3.2<L>  /  TBili  5.0<H>  /  DBili  .87<H>  /  AST  56<H>  /  ALT  56  /  AlkPhos  60  06-21    PT/INR - ( 21 Jun 2018 07:31 )   PT: 15.6 sec;   INR: 1.42 ratio         PTT - ( 21 Jun 2018 07:31 )  PTT:29.7 sec  I&O's Detail    21 Jun 2018 07:01  -  22 Jun 2018 07:00  --------------------------------------------------------  IN:    Oral Fluid: 360 mL    Solution: 100 mL  Total IN: 460 mL    OUT:    Indwelling Catheter - Urethral: 3450 mL  Total OUT: 3450 mL    Total NET: -2990 mL      22 Jun 2018 07:01  -  22 Jun 2018 22:00  --------------------------------------------------------  IN:    Oral Fluid: 400 mL  Total IN: 400 mL    OUT:    Indwelling Catheter - Urethral: 2150 mL  Total OUT: 2150 mL    Total NET: -1750 mL          wbc    06-22 @ 07:29    8.45    06-21 @ 11:57    10.15    06-21 @ 01:07    11.72        cr   06-22 @ 07:29   1.17    06-21 @ 11:57   1.17    06-21 @ 01:07   1.42

## 2018-06-23 LAB
ANION GAP SERPL CALC-SCNC: 7 MMOL/L — SIGNIFICANT CHANGE UP (ref 5–17)
BUN SERPL-MCNC: 11 MG/DL — SIGNIFICANT CHANGE UP (ref 7–23)
CALCIUM SERPL-MCNC: 8.1 MG/DL — LOW (ref 8.5–10.1)
CHLORIDE SERPL-SCNC: 106 MMOL/L — SIGNIFICANT CHANGE UP (ref 96–108)
CO2 SERPL-SCNC: 26 MMOL/L — SIGNIFICANT CHANGE UP (ref 22–31)
CREAT SERPL-MCNC: 0.99 MG/DL — SIGNIFICANT CHANGE UP (ref 0.5–1.3)
CULTURE RESULTS: SIGNIFICANT CHANGE UP
GLUCOSE SERPL-MCNC: 88 MG/DL — SIGNIFICANT CHANGE UP (ref 70–99)
HCT VFR BLD CALC: 35 % — LOW (ref 39–50)
HGB BLD-MCNC: 11.5 G/DL — LOW (ref 13–17)
MCHC RBC-ENTMCNC: 27.8 PG — SIGNIFICANT CHANGE UP (ref 27–34)
MCHC RBC-ENTMCNC: 32.9 GM/DL — SIGNIFICANT CHANGE UP (ref 32–36)
MCV RBC AUTO: 84.5 FL — SIGNIFICANT CHANGE UP (ref 80–100)
NRBC # BLD: 0 /100 WBCS — SIGNIFICANT CHANGE UP (ref 0–0)
PLATELET # BLD AUTO: 115 K/UL — LOW (ref 150–400)
POTASSIUM SERPL-MCNC: 3.7 MMOL/L — SIGNIFICANT CHANGE UP (ref 3.5–5.3)
POTASSIUM SERPL-SCNC: 3.7 MMOL/L — SIGNIFICANT CHANGE UP (ref 3.5–5.3)
RBC # BLD: 4.14 M/UL — LOW (ref 4.2–5.8)
RBC # FLD: 14.8 % — HIGH (ref 10.3–14.5)
SODIUM SERPL-SCNC: 139 MMOL/L — SIGNIFICANT CHANGE UP (ref 135–145)
WBC # BLD: 6.64 K/UL — SIGNIFICANT CHANGE UP (ref 3.8–10.5)
WBC # FLD AUTO: 6.64 K/UL — SIGNIFICANT CHANGE UP (ref 3.8–10.5)

## 2018-06-23 PROCEDURE — 99233 SBSQ HOSP IP/OBS HIGH 50: CPT

## 2018-06-23 RX ADMIN — PANTOPRAZOLE SODIUM 40 MILLIGRAM(S): 20 TABLET, DELAYED RELEASE ORAL at 08:01

## 2018-06-23 RX ADMIN — SODIUM CHLORIDE 100 MILLILITER(S): 9 INJECTION, SOLUTION INTRAVENOUS at 10:20

## 2018-06-23 RX ADMIN — SODIUM CHLORIDE 100 MILLILITER(S): 9 INJECTION, SOLUTION INTRAVENOUS at 18:12

## 2018-06-23 RX ADMIN — Medication 25 MILLIGRAM(S): at 18:12

## 2018-06-23 RX ADMIN — Medication 25 MILLIGRAM(S): at 05:27

## 2018-06-23 RX ADMIN — ATORVASTATIN CALCIUM 20 MILLIGRAM(S): 80 TABLET, FILM COATED ORAL at 21:40

## 2018-06-23 RX ADMIN — PIPERACILLIN AND TAZOBACTAM 25 GRAM(S): 4; .5 INJECTION, POWDER, LYOPHILIZED, FOR SOLUTION INTRAVENOUS at 05:24

## 2018-06-23 RX ADMIN — PIPERACILLIN AND TAZOBACTAM 25 GRAM(S): 4; .5 INJECTION, POWDER, LYOPHILIZED, FOR SOLUTION INTRAVENOUS at 21:39

## 2018-06-23 RX ADMIN — FINASTERIDE 5 MILLIGRAM(S): 5 TABLET, FILM COATED ORAL at 12:20

## 2018-06-23 RX ADMIN — SENNA PLUS 2 TABLET(S): 8.6 TABLET ORAL at 21:40

## 2018-06-23 RX ADMIN — TAMSULOSIN HYDROCHLORIDE 0.4 MILLIGRAM(S): 0.4 CAPSULE ORAL at 21:40

## 2018-06-23 RX ADMIN — PIPERACILLIN AND TAZOBACTAM 25 GRAM(S): 4; .5 INJECTION, POWDER, LYOPHILIZED, FOR SOLUTION INTRAVENOUS at 14:40

## 2018-06-23 RX ADMIN — Medication 100 MILLIGRAM(S): at 05:27

## 2018-06-23 NOTE — PROGRESS NOTE ADULT - SUBJECTIVE AND OBJECTIVE BOX
Patient seen and examined bedside resting comfortably.  No complaints offered.   Voiding without difficulty.  Denies hematuria and dysuria.  Denies nausea and vomiting. Tolerating diet.  Denies chest pain, dyspnea, cough.    T(F): 98.1 (06-23-18 @ 11:05), Max: 101 (06-22-18 @ 18:39)  HR: 75 (06-23-18 @ 11:05) (64 - 75)  BP: 124/74 (06-23-18 @ 11:05) (111/64 - 144/82)  RR: 18 (06-23-18 @ 11:05) (18 - 19)  SpO2: 97% (06-23-18 @ 11:05) (95% - 97%)  Wt(kg): --  CAPILLARY BLOOD GLUCOSE          PHYSICAL EXAM:  General: NAD, alert and awake  HEENT: NCAT, EOMI, conjunctiva clear  Chest: nonlabored respirations, good inspiratory effort  Abdomen: soft, NTND.   Extremities: no pedal edema or calf tenderness noted   : uncircumcised phallus, adequate meatus. Kinney catheter indwelling with clear yellow urine draining.    LABS:                        11.5   6.64  )-----------( 115      ( 23 Jun 2018 08:12 )             35.0   06-23    139  |  106  |  11  ----------------------------<  88  3.7   |  26  |  0.99    Ca    8.1<L>      23 Jun 2018 08:12      I&O's Detail    22 Jun 2018 07:01  -  23 Jun 2018 07:00  --------------------------------------------------------  IN:    Oral Fluid: 400 mL  Total IN: 400 mL    OUT:    Indwelling Catheter - Urethral: 4350 mL  Total OUT: 4350 mL    Total NET: -3950 mL      23 Jun 2018 07:01  -  23 Jun 2018 17:04  --------------------------------------------------------  IN:    Solution: 100 mL  Total IN: 100 mL    OUT:    Indwelling Catheter - Urethral: 900 mL  Total OUT: 900 mL    Total NET: -800 mL          Impression: 58y Male admitted with sepsis s/p TRUS/Bx as outpatient, with UTI and UR with L hydronephrosis    PMH   Hernia, femoral  Atrial fibrillation  BPH (benign prostatic hyperplasia)  Mitral insufficiency  Systolic CHF, acute on chronic      Plan:  as discussed with Dr Hansen, core lab called to identify bacteria in urine culture, await results.  continue broad spectrum abx coverage at present.  continue kinney catheter for retention  Continue medical management Patient seen and examined bedside resting comfortably.  No complaints. Denies f/c today.    T(F): 98.1 (06-23-18 @ 11:05), Max: 101 (06-22-18 @ 18:39)  HR: 75 (06-23-18 @ 11:05) (64 - 75)  BP: 124/74 (06-23-18 @ 11:05) (111/64 - 144/82)  RR: 18 (06-23-18 @ 11:05) (18 - 19)  SpO2: 97% (06-23-18 @ 11:05) (95% - 97%)  Wt(kg): --  CAPILLARY BLOOD GLUCOSE          PHYSICAL EXAM:  General: NAD, alert and awake  HEENT: NCAT, EOMI, conjunctiva clear  Chest: nonlabored respirations, good inspiratory effort  Abdomen: soft, NTND.   Extremities: no pedal edema or calf tenderness noted   : uncircumcised phallus, adequate meatus. Kinney catheter indwelling with clear yellow urine draining.    LABS:                        11.5   6.64  )-----------( 115      ( 23 Jun 2018 08:12 )             35.0   06-23    139  |  106  |  11  ----------------------------<  88  3.7   |  26  |  0.99    Ca    8.1<L>      23 Jun 2018 08:12      I&O's Detail    22 Jun 2018 07:01  -  23 Jun 2018 07:00  --------------------------------------------------------  IN:    Oral Fluid: 400 mL  Total IN: 400 mL    OUT:    Indwelling Catheter - Urethral: 4350 mL  Total OUT: 4350 mL    Total NET: -3950 mL      23 Jun 2018 07:01  -  23 Jun 2018 17:04  --------------------------------------------------------  IN:    Solution: 100 mL  Total IN: 100 mL    OUT:    Indwelling Catheter - Urethral: 900 mL  Total OUT: 900 mL    Total NET: -800 mL          Impression: 58y Male admitted with sepsis s/p TRUS/Bx as outpatient, with UTI and UR due to BPH     PMH   Hernia, femoral  Atrial fibrillation  BPH (benign prostatic hyperplasia)  Mitral insufficiency  Systolic CHF, acute on chronic      Plan:  as discussed with Dr Hansen, core lab called to identify bacteria in urine culture, await results.  continue broad spectrum abx coverage at present.  continue kinney catheter for retention  Continue medical management

## 2018-06-23 NOTE — PROGRESS NOTE ADULT - SUBJECTIVE AND OBJECTIVE BOX
Patient is a 58y old  Male who presents with a chief complaint of admitted due to fever, UTI (21 Jun 2018 04:17)      INTERVAL HPI/ OVERNIGHT EVENTS: Pt was seen and examined at bedside today, No significant overnight events, no fever overnight, pt denies abdominal pain.      MEDICATIONS  (STANDING):  atorvastatin 20 milliGRAM(s) Oral at bedtime  docusate sodium 100 milliGRAM(s) Oral two times a day  finasteride 5 milliGRAM(s) Oral daily  lactated ringers. 1000 milliLiter(s) (100 mL/Hr) IV Continuous <Continuous>  metoprolol tartrate 25 milliGRAM(s) Oral two times a day  pantoprazole    Tablet 40 milliGRAM(s) Oral before breakfast  piperacillin/tazobactam IVPB. 3.375 Gram(s) IV Intermittent every 8 hours  senna 2 Tablet(s) Oral at bedtime  tamsulosin 0.4 milliGRAM(s) Oral at bedtime    MEDICATIONS  (PRN):  acetaminophen   Tablet 650 milliGRAM(s) Oral every 6 hours PRN For Temp greater than 38 C (100.4 F)      Allergies    No Known Allergies    Intolerances        REVIEW OF SYSTEMS:    Unable to examine due to [ ] Altered Mental Status [ ] Advanced Dementia [ ] Expressive Aphasia [ ] Non-verbal patient    CONSTITUTIONAL: No fever, NO generalized weakness/Fatigue, No weight loss  EYES: No eye pain, visual disturbances, or discharge  ENMT:  No difficulty hearing, tinnitus, vertigo; No sinus or throat pain  NECK: No pain or stiffness  RESPIRATORY: No shortness of breath,  cough, wheezing, sputum or hemoptysis   CARDIOVASCULAR: No chest pain, palpitations, or leg swelling  GASTROINTESTINAL: No abdominal pain. No nausea, vomiting, diarrhea or constipation. No melena or hematochezia.  GENITOURINARY: kinney in place, No dysuria, frequency, hematuria, or incontinence  NEUROLOGICAL: No headaches, Dizziness, memory loss, loss of strength, numbness, or tremors  SKIN: No itching, burning, rashes, or lesions   MUSCULOSKELETAL: No joint pain or swelling; No muscle, back, or extremity pain  PSYCHIATRIC: No depression, anxiety, mood swings, or difficulty sleeping  HEME/LYMPH: No easy bruising, or bleeding gums        Vital Signs Last 24 Hrs  T(C): 36.7 (23 Jun 2018 11:05), Max: 38.3 (22 Jun 2018 18:39)  T(F): 98.1 (23 Jun 2018 11:05), Max: 101 (22 Jun 2018 18:39)  HR: 75 (23 Jun 2018 11:05) (64 - 75)  BP: 124/74 (23 Jun 2018 11:05) (111/64 - 144/82)  BP(mean): --  RR: 18 (23 Jun 2018 11:05) (18 - 19)  SpO2: 97% (23 Jun 2018 11:05) (95% - 97%)    PHYSICAL EXAM:  GENERAL: NAD, well-developed, well-groomed  HEAD:  Atraumatic, Normocephalic  EYES: conjunctiva and sclera clear  ENMT: Moist mucous membranes  NECK: Supple, No JVD, Normal thyroid  CHEST/LUNG: Clear to Auscultation bilaterally; No rales, rhonchi, wheezing, or rubs  HEART: Regular rate and rhythm; No murmurs, rubs, or gallops  ABDOMEN: Soft, Nontender, Nondistended; Bowel sounds present  EXTREMITIES:  2+ Peripheral Pulses, No clubbing, cyanosis, or edema  SKIN: No rashes or lesions  NERVOUS SYSTEM:  Alert & Oriented X3, Good concentration; Motor Strength 5/5 B/L upper and lower extremities    LABS:                        11.5   6.64  )-----------( 115      ( 23 Jun 2018 08:12 )             35.0     06-23    139  |  106  |  11  ----------------------------<  88  3.7   |  26  |  0.99    Ca    8.1<L>      23 Jun 2018 08:12          CAPILLARY BLOOD GLUCOSE            Culture - Blood (collected 06-21-18)  Source: .Blood Blood-Peripheral  Preliminary Report (06-22-18):    No growth to date.    Culture - Blood (collected 06-21-18)  Source: .Blood Blood-Venous  Preliminary Report (06-22-18):    No growth to date.    Culture - Urine (collected 06-21-18)  Source: .Urine Clean Catch (Midstream)  Final Report (06-22-18):    <10,000 CFU/ml Normal Urogenital jono present        RADIOLOGY & ADDITIONAL TESTS:          Imaging Personally Reviewed:  [ ] YES  [ ] NO    Consultant(s) Notes Reviewed:  [ ] YES  [ ] NO    Care Discussed with Consultants/Other Providers [x ] YES  [ ] NO

## 2018-06-24 LAB
-  AMIKACIN: SIGNIFICANT CHANGE UP
-  AMOXICILLIN/CLAVULANIC ACID: SIGNIFICANT CHANGE UP
-  AMPICILLIN/SULBACTAM: SIGNIFICANT CHANGE UP
-  AMPICILLIN: SIGNIFICANT CHANGE UP
-  AZTREONAM: SIGNIFICANT CHANGE UP
-  CEFAZOLIN: SIGNIFICANT CHANGE UP
-  CEFEPIME: SIGNIFICANT CHANGE UP
-  CEFOXITIN: SIGNIFICANT CHANGE UP
-  CEFTRIAXONE: SIGNIFICANT CHANGE UP
-  CIPROFLOXACIN: SIGNIFICANT CHANGE UP
-  ERTAPENEM: SIGNIFICANT CHANGE UP
-  GENTAMICIN: SIGNIFICANT CHANGE UP
-  IMIPENEM: SIGNIFICANT CHANGE UP
-  LEVOFLOXACIN: SIGNIFICANT CHANGE UP
-  MEROPENEM: SIGNIFICANT CHANGE UP
-  NITROFURANTOIN: SIGNIFICANT CHANGE UP
-  PIPERACILLIN/TAZOBACTAM: SIGNIFICANT CHANGE UP
-  TIGECYCLINE: SIGNIFICANT CHANGE UP
-  TOBRAMYCIN: SIGNIFICANT CHANGE UP
-  TRIMETHOPRIM/SULFAMETHOXAZOLE: SIGNIFICANT CHANGE UP
ANION GAP SERPL CALC-SCNC: 8 MMOL/L — SIGNIFICANT CHANGE UP (ref 5–17)
BUN SERPL-MCNC: 11 MG/DL — SIGNIFICANT CHANGE UP (ref 7–23)
CALCIUM SERPL-MCNC: 8.3 MG/DL — LOW (ref 8.5–10.1)
CHLORIDE SERPL-SCNC: 106 MMOL/L — SIGNIFICANT CHANGE UP (ref 96–108)
CO2 SERPL-SCNC: 25 MMOL/L — SIGNIFICANT CHANGE UP (ref 22–31)
CREAT SERPL-MCNC: 1.05 MG/DL — SIGNIFICANT CHANGE UP (ref 0.5–1.3)
CULTURE RESULTS: SIGNIFICANT CHANGE UP
GLUCOSE SERPL-MCNC: 88 MG/DL — SIGNIFICANT CHANGE UP (ref 70–99)
HCT VFR BLD CALC: 34.5 % — LOW (ref 39–50)
HGB BLD-MCNC: 11.1 G/DL — LOW (ref 13–17)
MCHC RBC-ENTMCNC: 27.2 PG — SIGNIFICANT CHANGE UP (ref 27–34)
MCHC RBC-ENTMCNC: 32.2 GM/DL — SIGNIFICANT CHANGE UP (ref 32–36)
MCV RBC AUTO: 84.6 FL — SIGNIFICANT CHANGE UP (ref 80–100)
METHOD TYPE: SIGNIFICANT CHANGE UP
NRBC # BLD: 0 /100 WBCS — SIGNIFICANT CHANGE UP (ref 0–0)
ORGANISM # SPEC MICROSCOPIC CNT: SIGNIFICANT CHANGE UP
ORGANISM # SPEC MICROSCOPIC CNT: SIGNIFICANT CHANGE UP
PLATELET # BLD AUTO: 127 K/UL — LOW (ref 150–400)
POTASSIUM SERPL-MCNC: 3.5 MMOL/L — SIGNIFICANT CHANGE UP (ref 3.5–5.3)
POTASSIUM SERPL-SCNC: 3.5 MMOL/L — SIGNIFICANT CHANGE UP (ref 3.5–5.3)
RBC # BLD: 4.08 M/UL — LOW (ref 4.2–5.8)
RBC # FLD: 14.9 % — HIGH (ref 10.3–14.5)
SODIUM SERPL-SCNC: 139 MMOL/L — SIGNIFICANT CHANGE UP (ref 135–145)
WBC # BLD: 6.03 K/UL — SIGNIFICANT CHANGE UP (ref 3.8–10.5)
WBC # FLD AUTO: 6.03 K/UL — SIGNIFICANT CHANGE UP (ref 3.8–10.5)

## 2018-06-24 PROCEDURE — 99233 SBSQ HOSP IP/OBS HIGH 50: CPT

## 2018-06-24 RX ORDER — ACETAMINOPHEN 500 MG
650 TABLET ORAL EVERY 6 HOURS
Qty: 0 | Refills: 0 | Status: DISCONTINUED | OUTPATIENT
Start: 2018-06-24 | End: 2018-06-25

## 2018-06-24 RX ORDER — APIXABAN 2.5 MG/1
5 TABLET, FILM COATED ORAL EVERY 12 HOURS
Qty: 0 | Refills: 0 | Status: DISCONTINUED | OUTPATIENT
Start: 2018-06-24 | End: 2018-06-25

## 2018-06-24 RX ADMIN — Medication 100 MILLIGRAM(S): at 06:34

## 2018-06-24 RX ADMIN — PIPERACILLIN AND TAZOBACTAM 25 GRAM(S): 4; .5 INJECTION, POWDER, LYOPHILIZED, FOR SOLUTION INTRAVENOUS at 14:17

## 2018-06-24 RX ADMIN — ATORVASTATIN CALCIUM 20 MILLIGRAM(S): 80 TABLET, FILM COATED ORAL at 22:42

## 2018-06-24 RX ADMIN — PIPERACILLIN AND TAZOBACTAM 25 GRAM(S): 4; .5 INJECTION, POWDER, LYOPHILIZED, FOR SOLUTION INTRAVENOUS at 22:42

## 2018-06-24 RX ADMIN — APIXABAN 5 MILLIGRAM(S): 2.5 TABLET, FILM COATED ORAL at 17:17

## 2018-06-24 RX ADMIN — Medication 25 MILLIGRAM(S): at 06:34

## 2018-06-24 RX ADMIN — Medication 25 MILLIGRAM(S): at 17:17

## 2018-06-24 RX ADMIN — PIPERACILLIN AND TAZOBACTAM 25 GRAM(S): 4; .5 INJECTION, POWDER, LYOPHILIZED, FOR SOLUTION INTRAVENOUS at 06:34

## 2018-06-24 RX ADMIN — PANTOPRAZOLE SODIUM 40 MILLIGRAM(S): 20 TABLET, DELAYED RELEASE ORAL at 06:34

## 2018-06-24 RX ADMIN — SODIUM CHLORIDE 100 MILLILITER(S): 9 INJECTION, SOLUTION INTRAVENOUS at 14:17

## 2018-06-24 RX ADMIN — TAMSULOSIN HYDROCHLORIDE 0.4 MILLIGRAM(S): 0.4 CAPSULE ORAL at 22:42

## 2018-06-24 RX ADMIN — FINASTERIDE 5 MILLIGRAM(S): 5 TABLET, FILM COATED ORAL at 11:46

## 2018-06-24 RX ADMIN — SENNA PLUS 2 TABLET(S): 8.6 TABLET ORAL at 22:42

## 2018-06-24 NOTE — PROGRESS NOTE ADULT - PROBLEM SELECTOR PLAN 3
Urology on board, CT abd/pelvis shows no stones or obstruction, cont kinney, flomax Urology on board, CT abd/pelvis shows no stones or obstruction, will do voiding trail today.

## 2018-06-24 NOTE — PROGRESS NOTE ADULT - ASSESSMENT
57 y/o male PMH BPH, htn, ?afib c/o fever for 2 days after prostate biopsy 3 days ago, developed fever, started on levofloxacin by PMD, had decreased urinary output, told to come to ED.  Has fever, leukocytosis, urinary obstruction on exam, UTI, needs kinney, renal ultrasound. ED physician discussed case with Dr Hansen, urology who will see patient.  IMPROVE VTE Individual Risk Assessment          RISK                                                          Points    [  ] Previous VTE                                                3    [  ] Thrombophilia                                             2    [  ] Lower limb paralysis                                    2        (unable to hold up >15 seconds)      [  ] Current Cancer                                             2         (within 6 months)    [  ] Immobilization > 24 hrs                              1    [  ] ICU/CCU stay > 24 hours                            1    [  ] Age > 60                                                    1    IMPROVE VTE Score __0_______
urinary retention;  continue Segura catheter. and antibiotics

## 2018-06-24 NOTE — PROGRESS NOTE ADULT - SUBJECTIVE AND OBJECTIVE BOX
Patient seen and examined at bedside in no distress.  No complaints offered.  Denies abdominal pain, back pain, fever, chills, chest pain, sob.    T(F): 98.2 (06-24-18 @ 05:11), Max: 99.2 (06-23-18 @ 17:59)  HR: 62 (06-24-18 @ 05:11) (62 - 75)  BP: 130/84 (06-24-18 @ 05:11) (122/82 - 135/82)  RR: 18 (06-24-18 @ 05:11) (18 - 18)  SpO2: 97% (06-24-18 @ 05:11) (97% - 97%)    PHYSICAL EXAM:  General: Alert & oriented x 3  CV: +S1S2 regular rate and rhythm  Lung: Respirations nonlabored  Abdomen: Soft, NTND  Extremities: No pedal edema  : Uncircumcised phallus, adequate meatus. Kinney catheter in place draining clear, yellow urine, output: 4450cc/24hrs    LABS:                        11.1   6.03  )-----------( 127      ( 24 Jun 2018 07:55 )             34.5     06-24    139  |  106  |  11  ----------------------------<  88  3.5   |  25  |  1.05    Ca    8.3<L>      24 Jun 2018 07:55    Urine culture: Culture - Urine (06.21.18 @ 08:50)    Specimen Source: .Urine Clean Catch (Midstream)    Culture Results:   <10,000 CFU/ml Klebsiella pneumoniae  Susceptibility to follow.    Impression: 58M PMH Afib, CHF, a/w sepsis s/p outpatient TRUS/Bx, with UTI, RAHAT (resolved), UR due to BPH   Plan:  Continue kinney catheter, monitor urine output  Monitor renal function  Antibiotics per medicine  Continue medical management  Will d/w Dr. Hansen

## 2018-06-24 NOTE — PROGRESS NOTE ADULT - SUBJECTIVE AND OBJECTIVE BOX
Patient is a 58y old  Male who presents with a chief complaint of admitted due to fever, UTI (21 Jun 2018 04:17)      INTERVAL HPI/ OVERNIGHT EVENTS: Pt was seen and examined at bedside today, No significant overnight events, pt denies any complaints.      MEDICATIONS  (STANDING):  atorvastatin 20 milliGRAM(s) Oral at bedtime  docusate sodium 100 milliGRAM(s) Oral two times a day  finasteride 5 milliGRAM(s) Oral daily  lactated ringers. 1000 milliLiter(s) (100 mL/Hr) IV Continuous <Continuous>  metoprolol tartrate 25 milliGRAM(s) Oral two times a day  pantoprazole    Tablet 40 milliGRAM(s) Oral before breakfast  piperacillin/tazobactam IVPB. 3.375 Gram(s) IV Intermittent every 8 hours  senna 2 Tablet(s) Oral at bedtime  tamsulosin 0.4 milliGRAM(s) Oral at bedtime    MEDICATIONS  (PRN):  acetaminophen   Tablet 650 milliGRAM(s) Oral every 6 hours PRN For Temp greater than 38 C (100.4 F)  acetaminophen   Tablet. 650 milliGRAM(s) Oral every 6 hours PRN Mild Pain (1 - 3)      Allergies    No Known Allergies    Intolerances        REVIEW OF SYSTEMS:    Unable to examine due to [ ] Altered Mental Status [ ] Advanced Dementia [ ] Expressive Aphasia [ ] Non-verbal patient    CONSTITUTIONAL: No fever, NO generalized weakness/Fatigue, No weight loss  EYES: No eye pain, visual disturbances, or discharge  ENMT:  No difficulty hearing, tinnitus, vertigo; No sinus or throat pain  NECK: No pain or stiffness  RESPIRATORY: No shortness of breath,  cough, wheezing, sputum or hemoptysis   CARDIOVASCULAR: No chest pain, palpitations, or leg swelling  GASTROINTESTINAL: No abdominal pain. No nausea, vomiting, diarrhea or constipation. No melena or hematochezia.  GENITOURINARY: No dysuria, frequency, hematuria, or incontinence  NEUROLOGICAL: No headaches, Dizziness, memory loss, loss of strength, numbness, or tremors  SKIN: No itching, burning, rashes, or lesions   MUSCULOSKELETAL: No joint pain or swelling; No muscle, back, or extremity pain  PSYCHIATRIC: No depression, anxiety, mood swings, or difficulty sleeping  HEME/LYMPH: No easy bruising, or bleeding gums      Vital Signs Last 24 Hrs  T(C): 36.3 (24 Jun 2018 10:54), Max: 37.3 (23 Jun 2018 17:59)  T(F): 97.4 (24 Jun 2018 10:54), Max: 99.2 (23 Jun 2018 17:59)  HR: 75 (24 Jun 2018 10:54) (62 - 75)  BP: 114/70 (24 Jun 2018 10:54) (114/70 - 135/82)  BP(mean): --  RR: 17 (24 Jun 2018 10:54) (17 - 18)  SpO2: 98% (24 Jun 2018 10:54) (97% - 98%)    PHYSICAL EXAM:  GENERAL: NAD, well-developed, well-groomed  HEAD:  Atraumatic, Normocephalic  EYES: conjunctiva and sclera clear  ENMT: Moist mucous membranes  NECK: Supple, No JVD, Normal thyroid  CHEST/LUNG: Clear to Auscultation bilaterally; No rales, rhonchi, wheezing, or rubs  HEART: Regular rate and rhythm; No murmurs, rubs, or gallops  ABDOMEN: Soft, Nontender, Nondistended; Bowel sounds present  EXTREMITIES:  2+ Peripheral Pulses, No clubbing, cyanosis, or edema  SKIN: No rashes or lesions  NERVOUS SYSTEM:  Alert & Oriented X3, Good concentration; Motor Strength 5/5 B/L upper and lower extremities    LABS:                        11.1   6.03  )-----------( 127      ( 24 Jun 2018 07:55 )             34.5     06-24    139  |  106  |  11  ----------------------------<  88  3.5   |  25  |  1.05    Ca    8.3<L>      24 Jun 2018 07:55          CAPILLARY BLOOD GLUCOSE            Culture - Blood (collected 06-21-18)  Source: .Blood Blood-Peripheral  Preliminary Report (06-22-18):    No growth to date.    Culture - Blood (collected 06-21-18)  Source: .Blood Blood-Venous  Preliminary Report (06-22-18):    No growth to date.    Culture - Urine (collected 06-21-18)  Source: .Urine Clean Catch (Midstream)  Final Report (06-23-18):    <10,000 CFU/ml Klebsiella pneumoniae    Susceptibility to follow.        RADIOLOGY & ADDITIONAL TESTS:          Imaging Personally Reviewed:  [ ] YES  [ ] NO    Consultant(s) Notes Reviewed:  [x ] YES  [ ] NO    Care Discussed with Consultants/Other Providers [x ] YES  [ ] NO

## 2018-06-24 NOTE — PROGRESS NOTE ADULT - PROBLEM SELECTOR PLAN 1
Sepsis POA, no fevers overnight, leukocytosis, hematuria appears to have cleared, cont with IV abx, blood and urine cx benign thus far
Sepsis POA, fevers overnight, leukocytosis resolved, hematuria appears to be clearing, cont with IV abx, blood and urine cx benign thus far
Klebsiella, Sepsis POA, no fevers overnight, leukocytosis, hematuria appears to have cleared, cont with IV abx

## 2018-06-24 NOTE — PROGRESS NOTE ADULT - PROBLEM SELECTOR PROBLEM 3
Benign prostatic hyperplasia with urinary obstruction

## 2018-06-24 NOTE — PROGRESS NOTE ADULT - PROBLEM SELECTOR PLAN 4
Eliquis on hold due to hematuria, f/u urology when to restart. will start Eliquis today, monitor for bleeding.

## 2018-06-25 ENCOUNTER — TRANSCRIPTION ENCOUNTER (OUTPATIENT)
Age: 58
End: 2018-06-25

## 2018-06-25 VITALS
SYSTOLIC BLOOD PRESSURE: 118 MMHG | OXYGEN SATURATION: 96 % | DIASTOLIC BLOOD PRESSURE: 73 MMHG | TEMPERATURE: 97 F | HEART RATE: 67 BPM | RESPIRATION RATE: 18 BRPM

## 2018-06-25 LAB
ANION GAP SERPL CALC-SCNC: 8 MMOL/L — SIGNIFICANT CHANGE UP (ref 5–17)
BUN SERPL-MCNC: 11 MG/DL — SIGNIFICANT CHANGE UP (ref 7–23)
CALCIUM SERPL-MCNC: 8.5 MG/DL — SIGNIFICANT CHANGE UP (ref 8.5–10.1)
CHLORIDE SERPL-SCNC: 104 MMOL/L — SIGNIFICANT CHANGE UP (ref 96–108)
CO2 SERPL-SCNC: 28 MMOL/L — SIGNIFICANT CHANGE UP (ref 22–31)
CREAT SERPL-MCNC: 0.99 MG/DL — SIGNIFICANT CHANGE UP (ref 0.5–1.3)
GLUCOSE SERPL-MCNC: 99 MG/DL — SIGNIFICANT CHANGE UP (ref 70–99)
HCT VFR BLD CALC: 33.9 % — LOW (ref 39–50)
HGB BLD-MCNC: 11.1 G/DL — LOW (ref 13–17)
MCHC RBC-ENTMCNC: 27.6 PG — SIGNIFICANT CHANGE UP (ref 27–34)
MCHC RBC-ENTMCNC: 32.7 GM/DL — SIGNIFICANT CHANGE UP (ref 32–36)
MCV RBC AUTO: 84.3 FL — SIGNIFICANT CHANGE UP (ref 80–100)
NRBC # BLD: 0 /100 WBCS — SIGNIFICANT CHANGE UP (ref 0–0)
PLATELET # BLD AUTO: 158 K/UL — SIGNIFICANT CHANGE UP (ref 150–400)
POTASSIUM SERPL-MCNC: 3.8 MMOL/L — SIGNIFICANT CHANGE UP (ref 3.5–5.3)
POTASSIUM SERPL-SCNC: 3.8 MMOL/L — SIGNIFICANT CHANGE UP (ref 3.5–5.3)
RBC # BLD: 4.02 M/UL — LOW (ref 4.2–5.8)
RBC # FLD: 14.7 % — HIGH (ref 10.3–14.5)
SODIUM SERPL-SCNC: 140 MMOL/L — SIGNIFICANT CHANGE UP (ref 135–145)
WBC # BLD: 7.62 K/UL — SIGNIFICANT CHANGE UP (ref 3.8–10.5)
WBC # FLD AUTO: 7.62 K/UL — SIGNIFICANT CHANGE UP (ref 3.8–10.5)

## 2018-06-25 PROCEDURE — 99238 HOSP IP/OBS DSCHRG MGMT 30/<: CPT

## 2018-06-25 RX ORDER — LACTOBACILLUS ACIDOPHILUS 100MM CELL
1 CAPSULE ORAL
Qty: 14 | Refills: 0
Start: 2018-06-25 | End: 2018-07-08

## 2018-06-25 RX ORDER — CIPROFLOXACIN LACTATE 400MG/40ML
1 VIAL (ML) INTRAVENOUS
Qty: 0 | Refills: 0 | COMMUNITY

## 2018-06-25 RX ORDER — METOPROLOL TARTRATE 50 MG
1 TABLET ORAL
Qty: 0 | Refills: 0 | DISCHARGE
Start: 2018-06-25

## 2018-06-25 RX ORDER — CEFDINIR 250 MG/5ML
1 POWDER, FOR SUSPENSION ORAL
Qty: 28 | Refills: 0
Start: 2018-06-25 | End: 2018-07-08

## 2018-06-25 RX ORDER — METOPROLOL TARTRATE 50 MG
1 TABLET ORAL
Qty: 0 | Refills: 0 | COMMUNITY

## 2018-06-25 RX ADMIN — Medication 25 MILLIGRAM(S): at 18:06

## 2018-06-25 RX ADMIN — SODIUM CHLORIDE 100 MILLILITER(S): 9 INJECTION, SOLUTION INTRAVENOUS at 02:23

## 2018-06-25 RX ADMIN — PANTOPRAZOLE SODIUM 40 MILLIGRAM(S): 20 TABLET, DELAYED RELEASE ORAL at 08:15

## 2018-06-25 RX ADMIN — PIPERACILLIN AND TAZOBACTAM 25 GRAM(S): 4; .5 INJECTION, POWDER, LYOPHILIZED, FOR SOLUTION INTRAVENOUS at 13:21

## 2018-06-25 RX ADMIN — Medication 25 MILLIGRAM(S): at 05:26

## 2018-06-25 RX ADMIN — Medication 100 MILLIGRAM(S): at 05:23

## 2018-06-25 RX ADMIN — PIPERACILLIN AND TAZOBACTAM 25 GRAM(S): 4; .5 INJECTION, POWDER, LYOPHILIZED, FOR SOLUTION INTRAVENOUS at 05:22

## 2018-06-25 RX ADMIN — FINASTERIDE 5 MILLIGRAM(S): 5 TABLET, FILM COATED ORAL at 11:34

## 2018-06-25 NOTE — DISCHARGE NOTE ADULT - SECONDARY DIAGNOSIS.
H/O mitral valve repair Chronic systolic congestive heart failure Sepsis, due to unspecified organism

## 2018-06-25 NOTE — DISCHARGE NOTE ADULT - CARE PROVIDER_API CALL
Benjie Hansen), Urology  [address] [place] [address]  Phone: [phone]  Fax: [phone]    follow up with your regular doctor,   Phone: (   )    -  Fax: (   )    -

## 2018-06-25 NOTE — PROGRESS NOTE ADULT - SUBJECTIVE AND OBJECTIVE BOX
Patient seen and examined bedside resting comfortably earlier today with Dr Hansen.  Voiding well since kinney removal.      T(F): 97.9 (06-25-18 @ 11:27), Max: 97.9 (06-24-18 @ 23:32)  HR: 69 (06-25-18 @ 11:27) (69 - 102)  BP: 122/83 (06-25-18 @ 11:27) (122/83 - 142/86)  RR: 16 (06-25-18 @ 11:27) (16 - 18)  SpO2: 97% (06-25-18 @ 11:27) (97% - 97%)  Wt(kg): --  CAPILLARY BLOOD GLUCOSE      PHYSICAL EXAM:  General: NAD, alert and awake  HEENT: NCAT, EOMI, conjunctiva clear  Chest: nonlabored respirations, good inspiratory effort  Abdomen: soft, NTND.   Extremities: no pedal edema or calf tenderness noted   : no suprapubic tenderness    LABS:                        11.1   7.62  )-----------( 158      ( 25 Jun 2018 06:53 )             33.9   06-25    140  |  104  |  11  ----------------------------<  99  3.8   |  28  |  0.99    Ca    8.5      25 Jun 2018 06:53      I&O's Detail    24 Jun 2018 07:01  -  25 Jun 2018 07:00  --------------------------------------------------------  IN:    lactated ringers.: 1200 mL    Solution: 300 mL  Total IN: 1500 mL    OUT:    Indwelling Catheter - Urethral: 2550 mL    Voided: 650 mL  Total OUT: 3200 mL    Total NET: -1700 mL      25 Jun 2018 07:01  -  25 Jun 2018 11:47  --------------------------------------------------------  IN:    Oral Fluid: 350 mL  Total IN: 350 mL    OUT:  Total OUT: 0 mL    Total NET: 350 mL          Impression: 58M PMH Afib, CHF, a/w sepsis s/p outpatient TRUS/Bx, with klebsiella UTI, RAHAT (resolved), UR due to BPH, s/p TOV, PVR 0  Plan:  Per Dr Hansen, patient is stable from urology standpoint for discharge on oral abx and flomax/proscar. Outpt follow up.

## 2018-06-25 NOTE — CHART NOTE - NSCHARTNOTEFT_GEN_A_CORE
To Whom It May Concern:    Please use this letter  as verfication that the above patient was hospitalized at [facility] from 6/21/18 to 6/25/18 and can return to work on Monday July 2, 2018.   If any questions or concerns please call [phone].     Best Regards,  Marisol Garcia MD

## 2018-06-25 NOTE — DISCHARGE NOTE ADULT - PLAN OF CARE
complete antibx follow up with urology take Eliquis stable due to klebiseilla in urine due to klebsiella in urine secondary to recent prostate biopsy

## 2018-06-25 NOTE — DISCHARGE NOTE ADULT - PROVIDER TOKENS
TOKEN:'[id number]',FREE:[LAST:[follow up with your regular doctor],PHONE:[(   )    -],FAX:[(   )    -]]

## 2018-06-25 NOTE — DISCHARGE NOTE ADULT - NSTOBACCOHOTLINE_GEN_A_CS
[facility] Smokers Quitline ([phone]) [facility] Smokers Quitline ([phone])

## 2018-06-25 NOTE — DISCHARGE NOTE ADULT - MEDICATION SUMMARY - MEDICATIONS TO TAKE
I will START or STAY ON the medications listed below when I get home from the hospital:    finasteride 5 mg oral tablet  -- 1 tab(s) by mouth once a day  -- Indication: For Prostate    tamsulosin 0.4 mg oral capsule  -- 1 cap(s) by mouth once a day (at bedtime)  -- Indication: For Prostate    Eliquis 5 mg oral tablet  -- 1 tab(s) by mouth 2 times a day  -- Indication: For Mitral valve replaced    atorvastatin 20 mg oral tablet  -- 1 tab(s) by mouth once a day (at bedtime)  -- Indication: For hld    metoprolol tartrate 25 mg oral tablet  -- 1 tab(s) by mouth 2 times a day  -- Indication: For htn    cefdinir 300 mg oral capsule  -- 1 cap(s) by mouth 2 times a day   -- Finish all this medication unless otherwise directed by prescriber.    -- Indication: For UTI (urinary tract infection)    Acidophilus oral capsule  -- 1 cap(s) by mouth once a day   -- Indication: For UTI (urinary tract infection)    pantoprazole 40 mg oral delayed release tablet  -- 1 tab(s) by mouth once a day (before a meal)  -- Indication: For reflux

## 2018-06-25 NOTE — DISCHARGE NOTE ADULT - CARE PLAN
Principal Discharge DX:	Urinary tract infection with hematuria, site unspecified  Goal:	complete antibx  Assessment and plan of treatment:	follow up with urology  Secondary Diagnosis:	H/O mitral valve repair  Goal:	take Eliquis  Secondary Diagnosis:	Chronic systolic congestive heart failure  Goal:	stable Principal Discharge DX:	Urinary tract infection with hematuria, site unspecified  Goal:	complete antibx  Assessment and plan of treatment:	follow up with urology  Secondary Diagnosis:	H/O mitral valve repair  Goal:	take Eliquis  Secondary Diagnosis:	Chronic systolic congestive heart failure  Goal:	stable  Secondary Diagnosis:	Sepsis, due to unspecified organism  Goal:	due to klebiseilla in urine Principal Discharge DX:	Urinary tract infection with hematuria, site unspecified  Goal:	complete antibx  Assessment and plan of treatment:	follow up with urology  Secondary Diagnosis:	H/O mitral valve repair  Goal:	take Eliquis  Secondary Diagnosis:	Chronic systolic congestive heart failure  Goal:	stable  Secondary Diagnosis:	Sepsis, due to unspecified organism  Goal:	due to klebsiella in urine secondary to recent prostate biopsy

## 2018-06-25 NOTE — DISCHARGE NOTE ADULT - PATIENT PORTAL LINK FT
You can access the [facility] Patient Portal, offered by [facility], by registering with the following website: [email or web address]

## 2018-06-25 NOTE — DISCHARGE NOTE ADULT - HOSPITAL COURSE
Problem/Plan - 1:  ·  Problem: Urinary tract infection with hematuria, site unspecified.  Plan: Klebsiella, Sepsis POA, no fevers overnight, leukocytosis, hematuria appears to have cleared,  change to cefdinir for 2 more weeks will treat as prostatitis as pt s/p biopsy on last week . f/u with dr. mendoza   Problem/Plan - 2:  ·  Problem: RAHAT (acute kidney injury).  Plan: Resolved.     Problem/Plan - 3:  ·  Problem: Benign prostatic hyperplasia with urinary obstruction.  Plan: Urology on board, CT abd/pelvis shows no stones or obstruction, s/p TOV and passed kinney d/c'd follow up with urology .       Problem/Plan - 4:  ·  Problem: Mitral valve replaced.  Plan: continue  Eliquis today, monitor for bleeding.

## 2018-06-26 LAB
CULTURE RESULTS: SIGNIFICANT CHANGE UP
CULTURE RESULTS: SIGNIFICANT CHANGE UP
SPECIMEN SOURCE: SIGNIFICANT CHANGE UP
SPECIMEN SOURCE: SIGNIFICANT CHANGE UP

## 2018-06-28 DIAGNOSIS — I48.91 UNSPECIFIED ATRIAL FIBRILLATION: ICD-10-CM

## 2018-06-28 DIAGNOSIS — I11.0 HYPERTENSIVE HEART DISEASE WITH HEART FAILURE: ICD-10-CM

## 2018-06-28 DIAGNOSIS — I50.22 CHRONIC SYSTOLIC (CONGESTIVE) HEART FAILURE: ICD-10-CM

## 2018-06-28 DIAGNOSIS — A41.59 OTHER GRAM-NEGATIVE SEPSIS: ICD-10-CM

## 2018-06-28 DIAGNOSIS — N40.1 BENIGN PROSTATIC HYPERPLASIA WITH LOWER URINARY TRACT SYMPTOMS: ICD-10-CM

## 2018-06-28 DIAGNOSIS — N17.9 ACUTE KIDNEY FAILURE, UNSPECIFIED: ICD-10-CM

## 2018-06-28 DIAGNOSIS — N13.8 OTHER OBSTRUCTIVE AND REFLUX UROPATHY: ICD-10-CM

## 2018-06-28 DIAGNOSIS — N13.30 UNSPECIFIED HYDRONEPHROSIS: ICD-10-CM

## 2018-06-28 DIAGNOSIS — R33.8 OTHER RETENTION OF URINE: ICD-10-CM

## 2018-06-28 DIAGNOSIS — R50.9 FEVER, UNSPECIFIED: ICD-10-CM

## 2018-06-28 DIAGNOSIS — N39.0 URINARY TRACT INFECTION, SITE NOT SPECIFIED: ICD-10-CM

## 2018-06-28 DIAGNOSIS — T81.4XXA INFECTION FOLLOWING A PROCEDURE, INITIAL ENCOUNTER: ICD-10-CM

## 2018-10-08 ENCOUNTER — FORM ENCOUNTER (OUTPATIENT)
Age: 58
End: 2018-10-08

## 2018-10-09 ENCOUNTER — OUTPATIENT (OUTPATIENT)
Dept: OUTPATIENT SERVICES | Facility: HOSPITAL | Age: 58
LOS: 1 days | End: 2018-10-09
Payer: COMMERCIAL

## 2018-10-09 ENCOUNTER — APPOINTMENT (OUTPATIENT)
Dept: CARDIOTHORACIC SURGERY | Facility: CLINIC | Age: 58
End: 2018-10-09
Payer: MEDICAID

## 2018-10-09 VITALS
TEMPERATURE: 97.4 F | HEART RATE: 51 BPM | DIASTOLIC BLOOD PRESSURE: 82 MMHG | WEIGHT: 164 LBS | OXYGEN SATURATION: 99 % | SYSTOLIC BLOOD PRESSURE: 132 MMHG | RESPIRATION RATE: 17 BRPM | BODY MASS INDEX: 25.69 KG/M2

## 2018-10-09 DIAGNOSIS — I05.9 RHEUMATIC MITRAL VALVE DISEASE, UNSPECIFIED: ICD-10-CM

## 2018-10-09 DIAGNOSIS — Z98.890 OTHER SPECIFIED POSTPROCEDURAL STATES: Chronic | ICD-10-CM

## 2018-10-09 PROCEDURE — 93306 TTE W/DOPPLER COMPLETE: CPT

## 2018-10-09 PROCEDURE — 99213 OFFICE O/P EST LOW 20 MIN: CPT

## 2018-10-09 PROCEDURE — 93306 TTE W/DOPPLER COMPLETE: CPT | Mod: 26

## 2018-10-10 VITALS
RESPIRATION RATE: 17 BRPM | HEART RATE: 51 BPM | SYSTOLIC BLOOD PRESSURE: 132 MMHG | OXYGEN SATURATION: 99 % | DIASTOLIC BLOOD PRESSURE: 82 MMHG | BODY MASS INDEX: 25.69 KG/M2 | TEMPERATURE: 97.4 F | WEIGHT: 164 LBS

## 2018-10-17 ENCOUNTER — APPOINTMENT (OUTPATIENT)
Dept: CARDIOLOGY | Facility: CLINIC | Age: 58
End: 2018-10-17
Payer: MEDICAID

## 2018-10-17 VITALS
WEIGHT: 173 LBS | HEIGHT: 67 IN | SYSTOLIC BLOOD PRESSURE: 126 MMHG | OXYGEN SATURATION: 98 % | BODY MASS INDEX: 27.15 KG/M2 | HEART RATE: 58 BPM | DIASTOLIC BLOOD PRESSURE: 70 MMHG

## 2018-10-17 PROCEDURE — 93000 ELECTROCARDIOGRAM COMPLETE: CPT

## 2018-10-17 PROCEDURE — 99214 OFFICE O/P EST MOD 30 MIN: CPT

## 2018-10-30 ENCOUNTER — NON-APPOINTMENT (OUTPATIENT)
Age: 58
End: 2018-10-30

## 2018-11-06 ENCOUNTER — APPOINTMENT (OUTPATIENT)
Dept: CARDIOLOGY | Facility: CLINIC | Age: 58
End: 2018-11-06
Payer: MEDICAID

## 2018-11-06 PROCEDURE — 93227 XTRNL ECG REC<48 HR R&I: CPT

## 2018-11-06 PROCEDURE — 93225 XTRNL ECG REC<48 HRS REC: CPT

## 2018-12-13 ENCOUNTER — RX RENEWAL (OUTPATIENT)
Age: 58
End: 2018-12-13

## 2019-01-29 ENCOUNTER — NON-APPOINTMENT (OUTPATIENT)
Age: 59
End: 2019-01-29

## 2019-01-29 ENCOUNTER — APPOINTMENT (OUTPATIENT)
Dept: CARDIOLOGY | Facility: CLINIC | Age: 59
End: 2019-01-29
Payer: MEDICAID

## 2019-01-29 VITALS
SYSTOLIC BLOOD PRESSURE: 138 MMHG | HEIGHT: 67 IN | WEIGHT: 176 LBS | HEART RATE: 64 BPM | DIASTOLIC BLOOD PRESSURE: 82 MMHG | OXYGEN SATURATION: 98 % | BODY MASS INDEX: 27.62 KG/M2

## 2019-01-29 PROCEDURE — 99214 OFFICE O/P EST MOD 30 MIN: CPT

## 2019-01-29 PROCEDURE — 93000 ELECTROCARDIOGRAM COMPLETE: CPT

## 2019-01-29 PROCEDURE — 99213 OFFICE O/P EST LOW 20 MIN: CPT

## 2019-01-29 NOTE — REASON FOR VISIT
[Follow-Up - Clinic] : a clinic follow-up of [Abnormal ECG] : an abnormal ECG [Hypertension] : hypertension [Palpitations] : palpitations

## 2019-01-29 NOTE — HISTORY OF PRESENT ILLNESS
[FreeTextEntry1] : Rodney is 58 years of age status post mitral and tricuspid valve annuloplasty repair and atrial flutter ablation along with hypertension follows up in the office feeling generally well with less frequent palpitations and PVCs. I reviewed his last echo a Holter monitor again with him. I understand his lisinopril dosing was just recently increased to 5 mg daily with benefit and he is reportedly eating healthy and denies any other chest symptoms at present.

## 2019-01-29 NOTE — PHYSICAL EXAM
[General Appearance - Well Developed] : well developed [Normal Appearance] : normal appearance [Well Groomed] : well groomed [General Appearance - Well Nourished] : well nourished [No Deformities] : no deformities [General Appearance - In No Acute Distress] : no acute distress [Normal Conjunctiva] : the conjunctiva exhibited no abnormalities [Eyelids - No Xanthelasma] : the eyelids demonstrated no xanthelasmas [Normal Oral Mucosa] : normal oral mucosa [No Oral Pallor] : no oral pallor [No Oral Cyanosis] : no oral cyanosis [Normal Jugular Venous A Waves Present] : normal jugular venous A waves present [Normal Jugular Venous V Waves Present] : normal jugular venous V waves present [No Jugular Venous Oslares A Waves] : no jugular venous solares A waves [Respiration, Rhythm And Depth] : normal respiratory rhythm and effort [Exaggerated Use Of Accessory Muscles For Inspiration] : no accessory muscle use [Auscultation Breath Sounds / Voice Sounds] : lungs were clear to auscultation bilaterally [Heart Rate And Rhythm] : heart rate and rhythm were normal [Heart Sounds] : normal S1 and S2 [Edema] : no peripheral edema present [Systolic grade ___/6] : A grade [unfilled]/6 systolic murmur was heard. [1+] : left 1+ [Bowel Sounds] : normal bowel sounds [Abdomen Soft] : soft [Abdomen Tenderness] : non-tender [Abdomen Mass (___ Cm)] : no abdominal mass palpated [Abnormal Walk] : normal gait [Gait - Sufficient For Exercise Testing] : the gait was sufficient for exercise testing [Nail Clubbing] : no clubbing of the fingernails [Cyanosis, Localized] : no localized cyanosis [Petechial Hemorrhages (___cm)] : no petechial hemorrhages [Skin Color & Pigmentation] : normal skin color and pigmentation [] : no rash [No Venous Stasis] : no venous stasis [Skin Lesions] : no skin lesions [No Skin Ulcers] : no skin ulcer [No Xanthoma] : no  xanthoma was observed [Oriented To Time, Place, And Person] : oriented to person, place, and time [Affect] : the affect was normal [Mood] : the mood was normal [Memory Recent] : recent memory was not impaired [No Anxiety] : not feeling anxious [Right Carotid Bruit] : no bruit heard over the right carotid [Left Carotid Bruit] : no bruit heard over the left carotid [Bruit] : no bruit heard

## 2019-01-29 NOTE — DISCUSSION/SUMMARY
[___ Month(s)] : [unfilled] month(s) [FreeTextEntry3] : or sooner if needed [FreeTextEntry1] : He will continue on his Lipitor and metoprolol plus lisinopril for cardiovascular risk reduction and blood pressure control. Should he feel worsening palpitations intermittently, he could take an extra half dose of metoprolol for better management. I recommended a heart healthy lifestyle including a low-saturated fat, low cholesterol diet with improved aerobic physical fitness over time for cardiovascular benefits. He will follow up with you for care and see me in about 6 months or sooner if needed.

## 2019-07-30 ENCOUNTER — NON-APPOINTMENT (OUTPATIENT)
Age: 59
End: 2019-07-30

## 2019-07-30 ENCOUNTER — APPOINTMENT (OUTPATIENT)
Dept: CARDIOLOGY | Facility: CLINIC | Age: 59
End: 2019-07-30
Payer: COMMERCIAL

## 2019-07-30 VITALS
DIASTOLIC BLOOD PRESSURE: 80 MMHG | BODY MASS INDEX: 27.78 KG/M2 | OXYGEN SATURATION: 99 % | HEIGHT: 67 IN | SYSTOLIC BLOOD PRESSURE: 130 MMHG | HEART RATE: 71 BPM | WEIGHT: 177 LBS

## 2019-07-30 DIAGNOSIS — R00.2 PALPITATIONS: ICD-10-CM

## 2019-07-30 PROCEDURE — 99214 OFFICE O/P EST MOD 30 MIN: CPT

## 2019-07-30 PROCEDURE — 93000 ELECTROCARDIOGRAM COMPLETE: CPT

## 2019-07-30 PROCEDURE — 93306 TTE W/DOPPLER COMPLETE: CPT

## 2019-07-30 NOTE — PHYSICAL EXAM
[General Appearance - Well Developed] : well developed [Normal Appearance] : normal appearance [Well Groomed] : well groomed [General Appearance - Well Nourished] : well nourished [No Deformities] : no deformities [General Appearance - In No Acute Distress] : no acute distress [Normal Conjunctiva] : the conjunctiva exhibited no abnormalities [Eyelids - No Xanthelasma] : the eyelids demonstrated no xanthelasmas [Normal Oral Mucosa] : normal oral mucosa [No Oral Pallor] : no oral pallor [No Oral Cyanosis] : no oral cyanosis [Normal Jugular Venous A Waves Present] : normal jugular venous A waves present [Normal Jugular Venous V Waves Present] : normal jugular venous V waves present [No Jugular Venous Solares A Waves] : no jugular venous solares A waves [Exaggerated Use Of Accessory Muscles For Inspiration] : no accessory muscle use [Respiration, Rhythm And Depth] : normal respiratory rhythm and effort [Auscultation Breath Sounds / Voice Sounds] : lungs were clear to auscultation bilaterally [Heart Rate And Rhythm] : heart rate and rhythm were normal [Heart Sounds] : normal S1 and S2 [Edema] : no peripheral edema present [Systolic grade ___/6] : A grade [unfilled]/6 systolic murmur was heard. [Left Carotid Bruit] : no bruit heard over the left carotid [Right Carotid Bruit] : no bruit heard over the right carotid [1+] : left 1+ [Bruit] : no bruit heard [Bowel Sounds] : normal bowel sounds [Abdomen Soft] : soft [Abdomen Tenderness] : non-tender [Abdomen Mass (___ Cm)] : no abdominal mass palpated [Abnormal Walk] : normal gait [Nail Clubbing] : no clubbing of the fingernails [Gait - Sufficient For Exercise Testing] : the gait was sufficient for exercise testing [Cyanosis, Localized] : no localized cyanosis [Petechial Hemorrhages (___cm)] : no petechial hemorrhages [Skin Color & Pigmentation] : normal skin color and pigmentation [No Venous Stasis] : no venous stasis [] : no rash [Skin Lesions] : no skin lesions [No Skin Ulcers] : no skin ulcer [Oriented To Time, Place, And Person] : oriented to person, place, and time [No Xanthoma] : no  xanthoma was observed [Mood] : the mood was normal [Affect] : the affect was normal [Memory Recent] : recent memory was not impaired [No Anxiety] : not feeling anxious

## 2019-07-30 NOTE — HISTORY OF PRESENT ILLNESS
[FreeTextEntry1] : Rodney is a pleasant 59-year-old gentleman with tricuspid and mitral valve angioplasty repair status post atrial flutter MAZE/ablation procedure in 2016, hypertension and frequent PVCs and palpitations which lately are occurring in the evening time when he lays on his left side. No current chest pains, shortness of breath on exertion he reports. He is otherwise feeling generally fine eating healthy maintaining a good weight. He takes his medications regularly.

## 2019-07-30 NOTE — DISCUSSION/SUMMARY
[FreeTextEntry3] : or sooner if needed [___ Month(s)] : [unfilled] month(s) [FreeTextEntry1] : He will continue his current lisinopril and metoprolol for LV dysfunction and blood pressure plus heart rate management. He will stay on Lipitor for lipid lowering. I prescribed amoxicillin 500 mg 4 tablets one hour before dental work should the need arise. I've ordered an echocardiogram to reassess his current LV function and valvular status. We will call him with test results and will follow up care. He could see me in about 6 months or sooner if needed.

## 2019-11-30 NOTE — PATIENT PROFILE ADULT. - TOBACCO USE
11/30/19 1300   Group 1   Start Time 1230   Stop Time 1330   Length (min) 60 Min   Group Type Inpatient   Group Name group psychotherapy   Focus of Group family   Attendance Present   Participation Minimal   Patient Response Attentive   Group Notes patient was attentive and cooperative during discussion but refused to complete the activity related to family characteristics.      Never smoker

## 2020-01-08 NOTE — PATIENT PROFILE ADULT. - NS PRO PT REFERRAL QUES 2 YN
Patient called to the office today, she is currently 13 weeks pregnant with complaints of vomiting and diarrhea x3 days, patient denies fever but has had the chills. Patient also states she has noticed abdominal cramping as well. Spoke with Paige Perez CNP who stated if patient has been sick for 3 days now and has been unable to keep anything down she recommends patient to go to the ER for IV fluids and evaluation. Notified patient of recommendations, patient voiced understanding.
no

## 2020-01-29 ENCOUNTER — APPOINTMENT (OUTPATIENT)
Dept: CARDIOLOGY | Facility: CLINIC | Age: 60
End: 2020-01-29
Payer: COMMERCIAL

## 2020-01-29 ENCOUNTER — TRANSCRIPTION ENCOUNTER (OUTPATIENT)
Age: 60
End: 2020-01-29

## 2020-01-29 ENCOUNTER — NON-APPOINTMENT (OUTPATIENT)
Age: 60
End: 2020-01-29

## 2020-01-29 VITALS
HEIGHT: 67 IN | WEIGHT: 177 LBS | DIASTOLIC BLOOD PRESSURE: 70 MMHG | HEART RATE: 70 BPM | OXYGEN SATURATION: 99 % | SYSTOLIC BLOOD PRESSURE: 110 MMHG | BODY MASS INDEX: 27.78 KG/M2

## 2020-01-29 PROCEDURE — 99214 OFFICE O/P EST MOD 30 MIN: CPT

## 2020-01-29 PROCEDURE — 93000 ELECTROCARDIOGRAM COMPLETE: CPT

## 2020-04-02 NOTE — PHYSICAL EXAM
[General Appearance - Well Developed] : well developed [Normal Appearance] : normal appearance [Well Groomed] : well groomed [General Appearance - Well Nourished] : well nourished [No Deformities] : no deformities [General Appearance - In No Acute Distress] : no acute distress [Normal Conjunctiva] : the conjunctiva exhibited no abnormalities [Eyelids - No Xanthelasma] : the eyelids demonstrated no xanthelasmas [Normal Oral Mucosa] : normal oral mucosa [No Oral Pallor] : no oral pallor [No Oral Cyanosis] : no oral cyanosis [Normal Jugular Venous A Waves Present] : normal jugular venous A waves present [Normal Jugular Venous V Waves Present] : normal jugular venous V waves present [No Jugular Venous Solares A Waves] : no jugular venous solares A waves [Respiration, Rhythm And Depth] : normal respiratory rhythm and effort [Exaggerated Use Of Accessory Muscles For Inspiration] : no accessory muscle use [Auscultation Breath Sounds / Voice Sounds] : lungs were clear to auscultation bilaterally [Heart Rate And Rhythm] : heart rate and rhythm were normal [Heart Sounds] : normal S1 and S2 [Edema] : no peripheral edema present [Systolic grade ___/6] : A grade [unfilled]/6 systolic murmur was heard. [1+] : left 1+ [Bowel Sounds] : normal bowel sounds [Abdomen Soft] : soft [Abdomen Tenderness] : non-tender [Abdomen Mass (___ Cm)] : no abdominal mass palpated [Abnormal Walk] : normal gait [Gait - Sufficient For Exercise Testing] : the gait was sufficient for exercise testing [Nail Clubbing] : no clubbing of the fingernails [Cyanosis, Localized] : no localized cyanosis [Petechial Hemorrhages (___cm)] : no petechial hemorrhages [Skin Color & Pigmentation] : normal skin color and pigmentation [] : no rash [No Venous Stasis] : no venous stasis [Skin Lesions] : no skin lesions [No Skin Ulcers] : no skin ulcer [No Xanthoma] : no  xanthoma was observed [Oriented To Time, Place, And Person] : oriented to person, place, and time [Affect] : the affect was normal [Mood] : the mood was normal [Memory Recent] : recent memory was not impaired [No Anxiety] : not feeling anxious [Right Carotid Bruit] : no bruit heard over the right carotid [Left Carotid Bruit] : no bruit heard over the left carotid [Bruit] : no bruit heard

## 2020-04-02 NOTE — HISTORY OF PRESENT ILLNESS
[FreeTextEntry1] : Rodney is a pleasant 59-year-old gentleman with tricuspid and mitral valve angioplasty repair status post atrial flutter MAZE/ablation procedure in 2016, hypertension and history of PVCs. Lately has been feeling generally fine without current chest complaints. Reportedly eating generally healthy and a balanced diet.

## 2020-04-02 NOTE — DISCUSSION/SUMMARY
[___ Month(s)] : [unfilled] month(s) [FreeTextEntry3] : or sooner if needed [FreeTextEntry1] : He will continue on his current antihypertensive medication strategy with metoprolol and lisinopril along with Lipitor for lipid lowering. I recommended a heart healthy lifestyle including a low-saturated fat, low cholesterol diet with improved aerobic physical fitness over time for cardiovascular benefits. Carbohydrate and sodium restriction along with weight loss over time encouraged. Followup with you for care and see me in about 6 months or sooner if needed.

## 2020-10-27 ENCOUNTER — NON-APPOINTMENT (OUTPATIENT)
Age: 60
End: 2020-10-27

## 2020-10-27 ENCOUNTER — APPOINTMENT (OUTPATIENT)
Dept: CARDIOLOGY | Facility: CLINIC | Age: 60
End: 2020-10-27
Payer: COMMERCIAL

## 2020-10-27 VITALS
HEIGHT: 67 IN | TEMPERATURE: 207.14 F | WEIGHT: 176 LBS | SYSTOLIC BLOOD PRESSURE: 138 MMHG | HEART RATE: 74 BPM | DIASTOLIC BLOOD PRESSURE: 85 MMHG | OXYGEN SATURATION: 98 % | BODY MASS INDEX: 27.62 KG/M2

## 2020-10-27 DIAGNOSIS — Z23 ENCOUNTER FOR IMMUNIZATION: ICD-10-CM

## 2020-10-27 PROCEDURE — 90686 IIV4 VACC NO PRSV 0.5 ML IM: CPT

## 2020-10-27 PROCEDURE — 99072 ADDL SUPL MATRL&STAF TM PHE: CPT

## 2020-10-27 PROCEDURE — G0008: CPT

## 2020-10-27 PROCEDURE — 99215 OFFICE O/P EST HI 40 MIN: CPT | Mod: 25

## 2020-10-27 PROCEDURE — 93000 ELECTROCARDIOGRAM COMPLETE: CPT | Mod: 59

## 2020-11-16 NOTE — DISCUSSION/SUMMARY
[___ Month(s)] : [unfilled] month(s) [FreeTextEntry3] : or sooner if needed [FreeTextEntry1] : He will continue on his lisinopril 5 mg daily metoprolol 50 mg ideally twice daily for heart rate and blood pressure management along with Lipitor for lipid lowering.  He reports he has dental work tomorrow and I reminded him and encouraged him to take amoxicillin 2000 mg 1 hour before his dental work cleaning procedure for endocarditis prophylaxis.  We will keep an eye out for blood work which was asked to be sent over for review.  Reviewed available cardiac data.  Low-fat, low-cholesterol diet with salt reduction and some weight loss over time along with improved aerobic fitness also emphasized.  Follow-up with you for care and see me in 6 months or sooner if needed.  We will take the liberty of giving him a flu vaccine today for flu prevention

## 2020-11-16 NOTE — HISTORY OF PRESENT ILLNESS
[FreeTextEntry1] : Rodney is 70 years of age with history of mitral and tricuspid valve annuloplasty repair, ablation for atrial flutter in 2016 hypertension who follows up in the office without current complaints.  He had recent blood work and results he reports are within acceptable limits.  No current chest symptoms and is eating generally healthy.  He reports his PSA was up and he had adjustment to urologic medications.  He sometimes takes metoprolol once daily if his pulse is a bit low.

## 2020-11-16 NOTE — ADDENDUM
[FreeTextEntry1] : I understand patient needs to have the gastro work-up in the near term.  From a current clinical and cardiac standpoint, he may proceed ahead with his upcoming elective colonoscopy with routine hemodynamic monitoring joaquin-procedure.

## 2020-11-19 ENCOUNTER — NON-APPOINTMENT (OUTPATIENT)
Age: 60
End: 2020-11-19

## 2020-11-19 ENCOUNTER — APPOINTMENT (OUTPATIENT)
Dept: CARDIOLOGY | Facility: CLINIC | Age: 60
End: 2020-11-19
Payer: COMMERCIAL

## 2020-11-19 VITALS
TEMPERATURE: 207.14 F | OXYGEN SATURATION: 99 % | HEART RATE: 78 BPM | SYSTOLIC BLOOD PRESSURE: 150 MMHG | HEIGHT: 66 IN | DIASTOLIC BLOOD PRESSURE: 90 MMHG | WEIGHT: 177 LBS | BODY MASS INDEX: 28.45 KG/M2

## 2020-11-19 PROCEDURE — 93000 ELECTROCARDIOGRAM COMPLETE: CPT

## 2020-11-19 PROCEDURE — 99213 OFFICE O/P EST LOW 20 MIN: CPT

## 2020-11-20 NOTE — HISTORY OF PRESENT ILLNESS
[Preoperative Visit] : for a medical evaluation prior to surgery [Scheduled Procedure ___] : a [unfilled] [Date of Surgery ___] : on [unfilled] [Surgeon Name ___] : surgeon: [unfilled] [Good] : Good [Cardiovascular Disease] : cardiovascular disease [Prior Anesthesia] : Prior anesthesia [Electrocardiogram] : ~T an ECG ~C was performed [Fever] : no fever [Chills] : no chills [Fatigue] : no fatigue [Chest Pain] : no chest pain [Cough] : no cough [Dyspnea] : no dyspnea [Dysuria] : no dysuria [Urinary Frequency] : no urinary frequency [Nausea] : no nausea [Vomiting] : no vomiting [Diarrhea] : no diarrhea [Abdominal Pain] : no abdominal pain [Easy Bruising] : no easy bruising [Lower Extremity Swelling] : no lower extremity swelling [Poor Exercise Tolerance] : no poor exercise tolerance [Diabetes] : no diabetes [Pulmonary Disease] : no pulmonary disease [Anti-Platelet Agents] : no anti-platelet agents [Nicotine Dependence] : no nicotine dependence [Alcohol Use] : no  alcohol use [Renal Disease] : no renal disease [GI Disease] : no gastrointestinal disease [Sleep Apnea] : no sleep apnea [Thromboembolic Problems] : no thromboembolic problems [Frequent use of NSAIDs] : no use of NSAIDs [Bleeding Disorder] : no bleeding disorder [Transfusion Reaction] : no transfusion reaction [Impaired Immunity] : no impaired immunity [Steroid Use in Last 6 Months] : no steroid use in the last six months [Prev Anesthesia Reaction] : no previous anesthesia reaction [Anesthesia Reaction] : no anesthesia reaction [Sudden Death] : no sudden death [Clotting Disorder] : no clotting disorder [de-identified] : [facility] [phone]

## 2020-11-20 NOTE — DISCUSSION/SUMMARY
[Procedure Low Risk] : the procedure risk is low [Patient Low Risk] : the patient is a low surgical risk [Optimized for Surgery] : the patient is optimized for surgery [As per surgery] : as per surgery [Continue] : Continue medications as currently directed [FreeTextEntry1] : There are no cardiac contraindications to the upcoming procedure. Given his history of mitral valve repair I am recommending antibiotic prophylaxis prior to the procedure. Follow up in this office as needed.

## 2021-04-27 ENCOUNTER — NON-APPOINTMENT (OUTPATIENT)
Age: 61
End: 2021-04-27

## 2021-04-27 ENCOUNTER — APPOINTMENT (OUTPATIENT)
Dept: CARDIOLOGY | Facility: CLINIC | Age: 61
End: 2021-04-27
Payer: COMMERCIAL

## 2021-04-27 VITALS
DIASTOLIC BLOOD PRESSURE: 82 MMHG | HEIGHT: 66 IN | BODY MASS INDEX: 28.45 KG/M2 | TEMPERATURE: 208.4 F | OXYGEN SATURATION: 98 % | SYSTOLIC BLOOD PRESSURE: 140 MMHG | WEIGHT: 177 LBS | HEART RATE: 73 BPM

## 2021-04-27 DIAGNOSIS — R94.31 ABNORMAL ELECTROCARDIOGRAM [ECG] [EKG]: ICD-10-CM

## 2021-04-27 PROCEDURE — 99214 OFFICE O/P EST MOD 30 MIN: CPT

## 2021-04-27 PROCEDURE — 93000 ELECTROCARDIOGRAM COMPLETE: CPT

## 2021-04-27 PROCEDURE — 99072 ADDL SUPL MATRL&STAF TM PHE: CPT

## 2021-04-27 NOTE — PHYSICAL EXAM

## 2021-04-27 NOTE — CARDIOLOGY SUMMARY
[de-identified] : Sinus  Rhythm \par -  T-abnormality  -Possible  Anterolateral and inferior  ischemia. \par vs. LVH with repolarization abnormality.\par \par ABNORMAL \par

## 2021-04-27 NOTE — DISCUSSION/SUMMARY
[___ Month(s)] : in [unfilled] month(s) [FreeTextEntry1] : He will continue on his Lipitor 20 mg nightly for lipid lowering along with lisinopril and metoprolol for heart rate and blood pressure management as well as PVC prevention which she had been seen to have occurred on previous Holter.  I reviewed his prior cardiac testing.  No cardiac testing indicated at present.  I recommended a heart healthy lifestyle including a low-saturated fat, low cholesterol and low sodium diet with continued aerobic physical fitness as he is doing over time for cardiovascular benefits.  Follow-up with you for care and see me in about 6 months or sooner if needed.  Kindly send over labs for review on file.

## 2021-04-27 NOTE — HISTORY OF PRESENT ILLNESS
[FreeTextEntry1] : Rodney is a pleasant 60-year-old with history of atrial flutter ablation in 2016 remaining in sinus rhythm and off anticoagulation presently, history of mitral and tricuspid valve annuloplasty repair, hypertension and elevated PSA/BPH who follows up today for cardiac checkup.  No current chest pain, shortness of breath, palpitations or edema noted.  Eating generally healthy.  His blood pressure is slightly elevated both at home and currently perhaps on the basis of some sodium dietary indiscretion as he actually had soup last evening and reports slightly high blood pressure today.  He has upcoming urology procedure for BPH biopsy and will be started on antibiotics preprocedure to prevent UTI.

## 2021-10-25 ENCOUNTER — NON-APPOINTMENT (OUTPATIENT)
Age: 61
End: 2021-10-25

## 2021-10-25 ENCOUNTER — APPOINTMENT (OUTPATIENT)
Dept: CARDIOLOGY | Facility: CLINIC | Age: 61
End: 2021-10-25
Payer: COMMERCIAL

## 2021-10-25 VITALS
BODY MASS INDEX: 27.32 KG/M2 | HEIGHT: 66 IN | DIASTOLIC BLOOD PRESSURE: 92 MMHG | WEIGHT: 170 LBS | HEART RATE: 76 BPM | OXYGEN SATURATION: 97 % | SYSTOLIC BLOOD PRESSURE: 146 MMHG

## 2021-10-25 PROCEDURE — 93000 ELECTROCARDIOGRAM COMPLETE: CPT

## 2021-10-25 PROCEDURE — 99214 OFFICE O/P EST MOD 30 MIN: CPT

## 2021-10-25 NOTE — DISCUSSION/SUMMARY
[___ Month(s)] : in [unfilled] month(s) [FreeTextEntry1] : He is advised to continue lisinopril 5 mg daily and go back to metoprolol 50 mg twice daily for better blood pressure management.  Instructed on following home blood pressure readings again and bring to our attention any concerning trends.  Continue Lipitor for lipid lowering.  Surveillance laboratories suggested either through our office or via his primary physician which we will do in the near term.  I recommended a heart healthy lifestyle including a low-saturated fat, low cholesterol diet with improved aerobic physical fitness over time for cardiovascular benefits.  Carbohydrate and sodium restriction along with weight loss over time encouraged.  No cardiac testing indicated at present.  Follow-up with you for care and see me in 6 months or sooner if needed.

## 2021-10-25 NOTE — REASON FOR VISIT
[Follow-Up - Clinic] : a clinic follow-up of [Abnormal ECG] : an abnormal ECG [Palpitations] : palpitations [Structural Heart and Valve Disease] : structural heart and valve disease [Hypertension] : hypertension

## 2021-10-25 NOTE — CARDIOLOGY SUMMARY
[de-identified] : Sinus  Rhythm \par Possible left ventricular hypertrophy on non-voltage basis. \par  -  Extensive T-abnormality  - Anterior/lateral and inferior ischemia  -consider inferior infarct (age undetermined). \par \par ABNORMAL

## 2021-10-25 NOTE — HISTORY OF PRESENT ILLNESS
[FreeTextEntry1] : Rodney is a pleasant 61-year-old with atrial flutter ablation, mitral and tricuspid valve annuloplasty repair and, hypertension follows up doing generally well except he has noticed his blood pressure is a bit elevated still.  He is not been taking metoprolol twice daily.  No current chest symptoms otherwise and is eating healthy.  Reviewed available labs and cardiac data.

## 2022-05-12 ENCOUNTER — APPOINTMENT (OUTPATIENT)
Dept: CARDIOLOGY | Facility: CLINIC | Age: 62
End: 2022-05-12
Payer: COMMERCIAL

## 2022-05-12 ENCOUNTER — NON-APPOINTMENT (OUTPATIENT)
Age: 62
End: 2022-05-12

## 2022-05-12 VITALS — SYSTOLIC BLOOD PRESSURE: 152 MMHG | DIASTOLIC BLOOD PRESSURE: 90 MMHG

## 2022-05-12 VITALS
WEIGHT: 184 LBS | HEART RATE: 62 BPM | TEMPERATURE: 208.4 F | SYSTOLIC BLOOD PRESSURE: 160 MMHG | BODY MASS INDEX: 29.57 KG/M2 | HEIGHT: 66 IN | DIASTOLIC BLOOD PRESSURE: 90 MMHG | OXYGEN SATURATION: 98 %

## 2022-05-12 DIAGNOSIS — E66.3 OVERWEIGHT: ICD-10-CM

## 2022-05-12 PROCEDURE — 99214 OFFICE O/P EST MOD 30 MIN: CPT | Mod: 25

## 2022-05-12 PROCEDURE — 93000 ELECTROCARDIOGRAM COMPLETE: CPT

## 2022-05-12 NOTE — REASON FOR VISIT
[Structural Heart and Valve Disease] : structural heart and valve disease [Follow-Up - Clinic] : a clinic follow-up of [Abnormal ECG] : an abnormal ECG [Hypertension] : hypertension [Palpitations] : palpitations

## 2022-05-12 NOTE — HISTORY OF PRESENT ILLNESS
[Preoperative Visit] : for a medical evaluation prior to surgery [Scheduled Procedure ___] : a [unfilled] [Date of Surgery ___] : on [unfilled] [Surgeon Name ___] : surgeon: [unfilled] [de-identified] : Dr. Paige Campos Fax [phone];  phone [phone] [FreeTextEntry1] : Rodney is a pleasant 61-year-old gentleman with hypertension, prior history of atrial flutter ablation, mitral and tricuspid valve annuloplasty repair who has upcoming colonoscopy and is here for cardiac assessment.  He denies current chest symptoms feels otherwise fine.  He is eating healthy and takes medications regularly.  He takes his Lipitor infrequently.

## 2022-05-12 NOTE — DISCUSSION/SUMMARY
[___ Month(s)] : in [unfilled] month(s) [Procedure Low Risk] : the procedure risk is low [Continue] : Continue medications as currently directed [FreeTextEntry1] : Blood is advised to continue his current antihypertensive regimen of lisinopril and metoprolol for blood pressure control.  I have advised that he take Lipitor more regularly and ideally daily.  Based on updated cardiac guidelines, no antibiotic prophylaxis is recommended for colonoscopy.  I reminded him that antibiotic prophylaxis is recommended for dental work.  From a current clinical and cardiac standpoint he may proceed ahead with his upcoming colonoscopy with routine hemodynamic monitoring.  Procedure.  Heart healthy diet and lifestyle again encouraged.  Follow-up with you for care and see me in 6 months or sooner should the need arise.

## 2022-05-12 NOTE — PHYSICAL EXAM
[General Appearance - Well Developed] : well developed [Normal Appearance] : normal appearance [Well Groomed] : well groomed [General Appearance - Well Nourished] : well nourished [No Deformities] : no deformities [General Appearance - In No Acute Distress] : no acute distress [Normal Conjunctiva] : the conjunctiva exhibited no abnormalities [Eyelids - No Xanthelasma] : the eyelids demonstrated no xanthelasmas [Normal Oral Mucosa] : normal oral mucosa [No Oral Pallor] : no oral pallor [No Oral Cyanosis] : no oral cyanosis [Normal Jugular Venous A Waves Present] : normal jugular venous A waves present [Normal Jugular Venous V Waves Present] : normal jugular venous V waves present [No Jugular Venous Solares A Waves] : no jugular venous solares A waves [Respiration, Rhythm And Depth] : normal respiratory rhythm and effort [Exaggerated Use Of Accessory Muscles For Inspiration] : no accessory muscle use [Auscultation Breath Sounds / Voice Sounds] : lungs were clear to auscultation bilaterally [Heart Rate And Rhythm] : heart rate and rhythm were normal [Heart Sounds] : normal S1 and S2 [Murmurs] : no murmurs present [Bowel Sounds] : normal bowel sounds [Abdomen Soft] : soft [Abdomen Tenderness] : non-tender [Abnormal Walk] : normal gait [Gait - Sufficient For Exercise Testing] : the gait was sufficient for exercise testing [Nail Clubbing] : no clubbing of the fingernails [Cyanosis, Localized] : no localized cyanosis [Petechial Hemorrhages (___cm)] : no petechial hemorrhages [Skin Color & Pigmentation] : normal skin color and pigmentation [] : no rash [No Venous Stasis] : no venous stasis [Skin Lesions] : no skin lesions [No Skin Ulcers] : no skin ulcer [No Xanthoma] : no  xanthoma was observed [Affect] : the affect was normal [Oriented To Time, Place, And Person] : oriented to person, place, and time [No Anxiety] : not feeling anxious [Mood] : the mood was normal

## 2022-10-07 ENCOUNTER — NON-APPOINTMENT (OUTPATIENT)
Age: 62
End: 2022-10-07

## 2022-10-07 ENCOUNTER — APPOINTMENT (OUTPATIENT)
Dept: CARDIOLOGY | Facility: CLINIC | Age: 62
End: 2022-10-07

## 2022-10-07 VITALS
SYSTOLIC BLOOD PRESSURE: 140 MMHG | DIASTOLIC BLOOD PRESSURE: 90 MMHG | BODY MASS INDEX: 27.48 KG/M2 | HEART RATE: 58 BPM | HEIGHT: 66 IN | WEIGHT: 171 LBS | OXYGEN SATURATION: 99 %

## 2022-10-07 PROCEDURE — 99214 OFFICE O/P EST MOD 30 MIN: CPT | Mod: 25

## 2022-10-07 PROCEDURE — 93000 ELECTROCARDIOGRAM COMPLETE: CPT

## 2022-10-07 RX ORDER — FINASTERIDE 5 MG/1
5 TABLET, FILM COATED ORAL DAILY
Refills: 0 | Status: DISCONTINUED | COMMUNITY
End: 2022-10-07

## 2022-10-07 NOTE — PHYSICAL EXAM
[General Appearance - Well Developed] : well developed [Normal Appearance] : normal appearance [Well Groomed] : well groomed [General Appearance - Well Nourished] : well nourished [No Deformities] : no deformities [General Appearance - In No Acute Distress] : no acute distress [Respiration, Rhythm And Depth] : normal respiratory rhythm and effort [Exaggerated Use Of Accessory Muscles For Inspiration] : no accessory muscle use [Auscultation Breath Sounds / Voice Sounds] : lungs were clear to auscultation bilaterally [Heart Rate And Rhythm] : heart rate and rhythm were normal [Heart Sounds] : normal S1 and S2 [Nail Clubbing] : no clubbing of the fingernails [Cyanosis, Localized] : no localized cyanosis [Petechial Hemorrhages (___cm)] : no petechial hemorrhages [] : no ischemic changes [Oriented To Time, Place, And Person] : oriented to person, place, and time [Affect] : the affect was normal [Mood] : the mood was normal [No Anxiety] : not feeling anxious [FreeTextEntry1] : II/VI systolic murmur at LLSB and apex

## 2022-10-07 NOTE — HISTORY OF PRESENT ILLNESS
[Preoperative Visit] : for a medical evaluation prior to surgery [Scheduled Procedure ___] : a [unfilled] [Date of Surgery ___] : on [unfilled] [Surgeon Name ___] : surgeon: [unfilled] [Good] : Good [Cardiovascular Disease] : cardiovascular disease [Prior Anesthesia] : Prior anesthesia [Electrocardiogram] : ~T an ECG ~C was performed [Fever] : no fever [Chills] : no chills [Fatigue] : no fatigue [Chest Pain] : no chest pain [Cough] : no cough [Dyspnea] : no dyspnea [Dysuria] : no dysuria [Urinary Frequency] : no urinary frequency [Nausea] : no nausea [Vomiting] : no vomiting [Diarrhea] : no diarrhea [Abdominal Pain] : no abdominal pain [Easy Bruising] : no easy bruising [Lower Extremity Swelling] : no lower extremity swelling [Poor Exercise Tolerance] : no poor exercise tolerance [Diabetes] : no diabetes [Pulmonary Disease] : no pulmonary disease [Anti-Platelet Agents] : no anti-platelet agents [Nicotine Dependence] : no nicotine dependence [Alcohol Use] : no  alcohol use [Renal Disease] : no renal disease [GI Disease] : no gastrointestinal disease [Sleep Apnea] : no sleep apnea [Thromboembolic Problems] : no thromboembolic problems [Frequent use of NSAIDs] : no use of NSAIDs [Transfusion Reaction] : no transfusion reaction [Impaired Immunity] : no impaired immunity [Steroid Use in Last 6 Months] : no steroid use in the last six months [Frequent Aspirin Use] : no frequent aspirin use [Prev Anesthesia Reaction] : no previous anesthesia reaction [Anesthesia Reaction] : no anesthesia reaction [Sudden Death] : no sudden death [Clotting Disorder] : no clotting disorder [Bleeding Disorder] : no bleeding disorder [de-identified] : [address] [place], [place] fax # [phone] [FreeTextEntry1] : Rodney is a pleasant 62-year-old with atrial flutter ablation, mitral and tricuspid valve annuloplasty repair and, hypertension follows up doing generally well except he has noticed his blood pressure is a bit elevated still. He is not been taking metoprolol twice daily. No current chest symptoms otherwise and is eating healthy. Reviewed available labs and cardiac data. \par

## 2022-10-07 NOTE — DISCUSSION/SUMMARY
[Procedure Low Risk] : the procedure risk is low [Patient Low Risk] : the patient is a low surgical risk [Optimized for Surgery] : the patient is optimized for surgery [As per surgery] : as per surgery [Continue] : Continue medications as currently directed [FreeTextEntry1] : There are no cardiac contraindications to the upcoming procedure. Patient does not need antibiotic prophylaxis prior to the procedure. Follow up in this office as needed.

## 2022-11-14 ENCOUNTER — APPOINTMENT (OUTPATIENT)
Dept: CARDIOLOGY | Facility: CLINIC | Age: 62
End: 2022-11-14

## 2022-11-18 ENCOUNTER — APPOINTMENT (OUTPATIENT)
Dept: CARDIOLOGY | Facility: CLINIC | Age: 62
End: 2022-11-18

## 2022-11-18 PROCEDURE — 93306 TTE W/DOPPLER COMPLETE: CPT

## 2023-02-08 ENCOUNTER — NON-APPOINTMENT (OUTPATIENT)
Age: 63
End: 2023-02-08

## 2023-02-08 ENCOUNTER — APPOINTMENT (OUTPATIENT)
Dept: CARDIOLOGY | Facility: CLINIC | Age: 63
End: 2023-02-08
Payer: COMMERCIAL

## 2023-02-08 VITALS
WEIGHT: 174 LBS | OXYGEN SATURATION: 95 % | BODY MASS INDEX: 27.97 KG/M2 | SYSTOLIC BLOOD PRESSURE: 140 MMHG | DIASTOLIC BLOOD PRESSURE: 70 MMHG | HEART RATE: 94 BPM | HEIGHT: 66 IN

## 2023-02-08 DIAGNOSIS — I11.9 HYPERTENSIVE HEART DISEASE W/OUT HEART FAILURE: ICD-10-CM

## 2023-02-08 DIAGNOSIS — Z01.810 ENCOUNTER FOR PREPROCEDURAL CARDIOVASCULAR EXAMINATION: ICD-10-CM

## 2023-02-08 PROCEDURE — 93000 ELECTROCARDIOGRAM COMPLETE: CPT

## 2023-02-08 PROCEDURE — 99214 OFFICE O/P EST MOD 30 MIN: CPT | Mod: 25

## 2023-02-10 PROBLEM — I11.9 HYPERTENSIVE HEART DISEASE WITHOUT HEART FAILURE: Status: RESOLVED | Noted: 2021-10-25 | Resolved: 2023-02-10

## 2023-02-10 PROBLEM — Z01.810 PREOPERATIVE CARDIOVASCULAR EXAMINATION: Status: ACTIVE | Noted: 2023-02-10

## 2023-02-10 NOTE — DISCUSSION/SUMMARY
[___ Month(s)] : in [unfilled] month(s) [FreeTextEntry1] : He will continue on his current antihypertensive regimen of lisinopril and metoprolol.  Continue Lipitor for lipid lowering.  From a current clinical and cardiac standpoint, he may proceed ahead with his upcoming cataract surgery with routine hemodynamic monitoring perioperatively.  Follow-up fever care and see me in about 6 months or sooner should the need arise.

## 2023-02-10 NOTE — CARDIOLOGY SUMMARY
[de-identified] : Sinus  Rhythm  Voltage criteria for LVH  (S(V1)+R(V5) exceeds 4.00 mV).   -  T-abnormality  -Possible  Anterolateral and inferior  ischemia  -consider inferior apical infarct (age undetermined).   ABNORMAL

## 2023-02-10 NOTE — HISTORY OF PRESENT ILLNESS
[FreeTextEntry1] : Having right eye cataract surgery on March 29, 2023 with Dr. Quan Milan.  Otherwise he is doing fine and denies any current symptoms.  History significant for hypertension, mitral and tricuspid valve annuloplasty repair and atrial fibrillation ablation remaining in sinus rhythm.  He is eating healthy and takes his medications as prescribed.

## 2023-08-08 ENCOUNTER — APPOINTMENT (OUTPATIENT)
Dept: CARDIOLOGY | Facility: CLINIC | Age: 63
End: 2023-08-08
Payer: COMMERCIAL

## 2023-08-08 ENCOUNTER — NON-APPOINTMENT (OUTPATIENT)
Age: 63
End: 2023-08-08

## 2023-08-08 VITALS
DIASTOLIC BLOOD PRESSURE: 88 MMHG | BODY MASS INDEX: 27.8 KG/M2 | HEART RATE: 70 BPM | OXYGEN SATURATION: 98 % | HEIGHT: 66 IN | SYSTOLIC BLOOD PRESSURE: 142 MMHG | WEIGHT: 173 LBS

## 2023-08-08 DIAGNOSIS — Z86.79 PERSONAL HISTORY OF OTHER DISEASES OF THE CIRCULATORY SYSTEM: ICD-10-CM

## 2023-08-08 PROCEDURE — 93000 ELECTROCARDIOGRAM COMPLETE: CPT

## 2023-08-08 PROCEDURE — 99213 OFFICE O/P EST LOW 20 MIN: CPT | Mod: 25

## 2023-08-08 RX ORDER — AMOXICILLIN 500 MG/1
500 TABLET, FILM COATED ORAL
Qty: 4 | Refills: 3 | Status: DISCONTINUED | COMMUNITY
Start: 2019-07-30 | End: 2023-08-08

## 2023-08-08 NOTE — PHYSICAL EXAM
[Well Developed] : well developed [Well Nourished] : well nourished [No Acute Distress] : no acute distress [Normal Conjunctiva] : normal conjunctiva [Normal Venous Pressure] : normal venous pressure [No Carotid Bruit] : no carotid bruit [Normal S1, S2] : normal S1, S2 [No Rub] : no rub [No Gallop] : no gallop [Clear Lung Fields] : clear lung fields [Good Air Entry] : good air entry [No Respiratory Distress] : no respiratory distress  [Soft] : abdomen soft [Non Tender] : non-tender [No Masses/organomegaly] : no masses/organomegaly [Normal Bowel Sounds] : normal bowel sounds [Normal Gait] : normal gait [No Edema] : no edema [No Cyanosis] : no cyanosis [No Clubbing] : no clubbing [No Varicosities] : no varicosities [No Rash] : no rash [No Skin Lesions] : no skin lesions [Moves all extremities] : moves all extremities [No Focal Deficits] : no focal deficits [Normal Speech] : normal speech [Alert and Oriented] : alert and oriented [Normal memory] : normal memory [Murmur] : murmur [de-identified] : 2/6 systolic ejection murmur at left lower sternal border.

## 2023-08-08 NOTE — DISCUSSION/SUMMARY
[___ Month(s)] : in [unfilled] month(s) [FreeTextEntry1] : Continue metoprolol, lisinopril for blood pressure management and Lipitor for lipid lowering.  Reviewed recent available labs which are respectable.  PSA remains elevated and is under the urology care.  He may proceed ahead from a current cardiac standpoint for upcoming dental work and I have advised amoxicillin 2000 mg 1 hour prior to surgery which is going to be done in staged procedures it sounds.  Have advised that he take his antibiotics 1 hour prior to dental surgery each time.  Overall heart healthy diet and lifestyle again encouraged.  No cardiac testing indicated at present.  Follow-up with you for care and see us in 6 months or sooner if needed.

## 2023-08-08 NOTE — HISTORY OF PRESENT ILLNESS
[FreeTextEntry1] : Rodney is a pleasant 63-year-old with history of mitral and tricuspid valve annuloplasty, A-fib ablation, hypertension, following up in the office feeling fine.  He has upcoming dental work involving what sounds like a tooth extraction and dental implants.  No current chest complaints.

## 2024-01-04 ENCOUNTER — INPATIENT (INPATIENT)
Facility: HOSPITAL | Age: 64
LOS: 1 days | Discharge: HOME | End: 2024-01-06
Attending: INTERNAL MEDICINE | Admitting: INTERNAL MEDICINE
Payer: COMMERCIAL

## 2024-01-04 VITALS
SYSTOLIC BLOOD PRESSURE: 159 MMHG | HEIGHT: 67 IN | OXYGEN SATURATION: 97 % | WEIGHT: 169.98 LBS | HEART RATE: 83 BPM | TEMPERATURE: 98 F | RESPIRATION RATE: 17 BRPM | DIASTOLIC BLOOD PRESSURE: 104 MMHG

## 2024-01-04 DIAGNOSIS — Z98.890 OTHER SPECIFIED POSTPROCEDURAL STATES: Chronic | ICD-10-CM

## 2024-01-04 DIAGNOSIS — E78.5 HYPERLIPIDEMIA, UNSPECIFIED: ICD-10-CM

## 2024-01-04 DIAGNOSIS — I48.91 UNSPECIFIED ATRIAL FIBRILLATION: ICD-10-CM

## 2024-01-04 DIAGNOSIS — I10 ESSENTIAL (PRIMARY) HYPERTENSION: ICD-10-CM

## 2024-01-04 DIAGNOSIS — R07.9 CHEST PAIN, UNSPECIFIED: ICD-10-CM

## 2024-01-04 LAB
ALBUMIN SERPL ELPH-MCNC: 3.7 G/DL — SIGNIFICANT CHANGE UP (ref 3.3–5)
ALBUMIN SERPL ELPH-MCNC: 3.7 G/DL — SIGNIFICANT CHANGE UP (ref 3.3–5)
ALP SERPL-CCNC: 80 U/L — SIGNIFICANT CHANGE UP (ref 40–120)
ALP SERPL-CCNC: 80 U/L — SIGNIFICANT CHANGE UP (ref 40–120)
ALT FLD-CCNC: 40 U/L — SIGNIFICANT CHANGE UP (ref 12–78)
ALT FLD-CCNC: 40 U/L — SIGNIFICANT CHANGE UP (ref 12–78)
ANION GAP SERPL CALC-SCNC: 5 MMOL/L — SIGNIFICANT CHANGE UP (ref 5–17)
ANION GAP SERPL CALC-SCNC: 5 MMOL/L — SIGNIFICANT CHANGE UP (ref 5–17)
APTT BLD: 35.5 SEC — SIGNIFICANT CHANGE UP (ref 24.5–35.6)
APTT BLD: 35.5 SEC — SIGNIFICANT CHANGE UP (ref 24.5–35.6)
AST SERPL-CCNC: 38 U/L — HIGH (ref 15–37)
AST SERPL-CCNC: 38 U/L — HIGH (ref 15–37)
BASOPHILS # BLD AUTO: 0.03 K/UL — SIGNIFICANT CHANGE UP (ref 0–0.2)
BASOPHILS # BLD AUTO: 0.03 K/UL — SIGNIFICANT CHANGE UP (ref 0–0.2)
BASOPHILS NFR BLD AUTO: 0.7 % — SIGNIFICANT CHANGE UP (ref 0–2)
BASOPHILS NFR BLD AUTO: 0.7 % — SIGNIFICANT CHANGE UP (ref 0–2)
BILIRUB SERPL-MCNC: 0.8 MG/DL — SIGNIFICANT CHANGE UP (ref 0.2–1.2)
BILIRUB SERPL-MCNC: 0.8 MG/DL — SIGNIFICANT CHANGE UP (ref 0.2–1.2)
BUN SERPL-MCNC: 17 MG/DL — SIGNIFICANT CHANGE UP (ref 7–23)
BUN SERPL-MCNC: 17 MG/DL — SIGNIFICANT CHANGE UP (ref 7–23)
CALCIUM SERPL-MCNC: 9.1 MG/DL — SIGNIFICANT CHANGE UP (ref 8.5–10.1)
CALCIUM SERPL-MCNC: 9.1 MG/DL — SIGNIFICANT CHANGE UP (ref 8.5–10.1)
CHLORIDE SERPL-SCNC: 104 MMOL/L — SIGNIFICANT CHANGE UP (ref 96–108)
CHLORIDE SERPL-SCNC: 104 MMOL/L — SIGNIFICANT CHANGE UP (ref 96–108)
CO2 SERPL-SCNC: 31 MMOL/L — SIGNIFICANT CHANGE UP (ref 22–31)
CO2 SERPL-SCNC: 31 MMOL/L — SIGNIFICANT CHANGE UP (ref 22–31)
CREAT SERPL-MCNC: 1.65 MG/DL — HIGH (ref 0.5–1.3)
CREAT SERPL-MCNC: 1.65 MG/DL — HIGH (ref 0.5–1.3)
EGFR: 47 ML/MIN/1.73M2 — LOW
EGFR: 47 ML/MIN/1.73M2 — LOW
EOSINOPHIL # BLD AUTO: 0.09 K/UL — SIGNIFICANT CHANGE UP (ref 0–0.5)
EOSINOPHIL # BLD AUTO: 0.09 K/UL — SIGNIFICANT CHANGE UP (ref 0–0.5)
EOSINOPHIL NFR BLD AUTO: 2 % — SIGNIFICANT CHANGE UP (ref 0–6)
EOSINOPHIL NFR BLD AUTO: 2 % — SIGNIFICANT CHANGE UP (ref 0–6)
GLUCOSE SERPL-MCNC: 118 MG/DL — HIGH (ref 70–99)
GLUCOSE SERPL-MCNC: 118 MG/DL — HIGH (ref 70–99)
HCT VFR BLD CALC: 42.1 % — SIGNIFICANT CHANGE UP (ref 39–50)
HCT VFR BLD CALC: 42.1 % — SIGNIFICANT CHANGE UP (ref 39–50)
HGB BLD-MCNC: 13.7 G/DL — SIGNIFICANT CHANGE UP (ref 13–17)
HGB BLD-MCNC: 13.7 G/DL — SIGNIFICANT CHANGE UP (ref 13–17)
IMM GRANULOCYTES NFR BLD AUTO: 0.2 % — SIGNIFICANT CHANGE UP (ref 0–0.9)
IMM GRANULOCYTES NFR BLD AUTO: 0.2 % — SIGNIFICANT CHANGE UP (ref 0–0.9)
INR BLD: 0.92 RATIO — SIGNIFICANT CHANGE UP (ref 0.85–1.18)
INR BLD: 0.92 RATIO — SIGNIFICANT CHANGE UP (ref 0.85–1.18)
LYMPHOCYTES # BLD AUTO: 1.8 K/UL — SIGNIFICANT CHANGE UP (ref 1–3.3)
LYMPHOCYTES # BLD AUTO: 1.8 K/UL — SIGNIFICANT CHANGE UP (ref 1–3.3)
LYMPHOCYTES # BLD AUTO: 40 % — SIGNIFICANT CHANGE UP (ref 13–44)
LYMPHOCYTES # BLD AUTO: 40 % — SIGNIFICANT CHANGE UP (ref 13–44)
MAGNESIUM SERPL-MCNC: 2.1 MG/DL — SIGNIFICANT CHANGE UP (ref 1.6–2.6)
MAGNESIUM SERPL-MCNC: 2.1 MG/DL — SIGNIFICANT CHANGE UP (ref 1.6–2.6)
MCHC RBC-ENTMCNC: 27.5 PG — SIGNIFICANT CHANGE UP (ref 27–34)
MCHC RBC-ENTMCNC: 27.5 PG — SIGNIFICANT CHANGE UP (ref 27–34)
MCHC RBC-ENTMCNC: 32.5 G/DL — SIGNIFICANT CHANGE UP (ref 32–36)
MCHC RBC-ENTMCNC: 32.5 G/DL — SIGNIFICANT CHANGE UP (ref 32–36)
MCV RBC AUTO: 84.4 FL — SIGNIFICANT CHANGE UP (ref 80–100)
MCV RBC AUTO: 84.4 FL — SIGNIFICANT CHANGE UP (ref 80–100)
MONOCYTES # BLD AUTO: 0.33 K/UL — SIGNIFICANT CHANGE UP (ref 0–0.9)
MONOCYTES # BLD AUTO: 0.33 K/UL — SIGNIFICANT CHANGE UP (ref 0–0.9)
MONOCYTES NFR BLD AUTO: 7.3 % — SIGNIFICANT CHANGE UP (ref 2–14)
MONOCYTES NFR BLD AUTO: 7.3 % — SIGNIFICANT CHANGE UP (ref 2–14)
NEUTROPHILS # BLD AUTO: 2.24 K/UL — SIGNIFICANT CHANGE UP (ref 1.8–7.4)
NEUTROPHILS # BLD AUTO: 2.24 K/UL — SIGNIFICANT CHANGE UP (ref 1.8–7.4)
NEUTROPHILS NFR BLD AUTO: 49.8 % — SIGNIFICANT CHANGE UP (ref 43–77)
NEUTROPHILS NFR BLD AUTO: 49.8 % — SIGNIFICANT CHANGE UP (ref 43–77)
NRBC # BLD: 0 /100 WBCS — SIGNIFICANT CHANGE UP (ref 0–0)
NRBC # BLD: 0 /100 WBCS — SIGNIFICANT CHANGE UP (ref 0–0)
PLATELET # BLD AUTO: 129 K/UL — LOW (ref 150–400)
PLATELET # BLD AUTO: 129 K/UL — LOW (ref 150–400)
POTASSIUM SERPL-MCNC: 4.1 MMOL/L — SIGNIFICANT CHANGE UP (ref 3.5–5.3)
POTASSIUM SERPL-MCNC: 4.1 MMOL/L — SIGNIFICANT CHANGE UP (ref 3.5–5.3)
POTASSIUM SERPL-SCNC: 4.1 MMOL/L — SIGNIFICANT CHANGE UP (ref 3.5–5.3)
POTASSIUM SERPL-SCNC: 4.1 MMOL/L — SIGNIFICANT CHANGE UP (ref 3.5–5.3)
PROT SERPL-MCNC: 8.1 GM/DL — SIGNIFICANT CHANGE UP (ref 6–8.3)
PROT SERPL-MCNC: 8.1 GM/DL — SIGNIFICANT CHANGE UP (ref 6–8.3)
PROTHROM AB SERPL-ACNC: 11 SEC — SIGNIFICANT CHANGE UP (ref 9.5–13)
PROTHROM AB SERPL-ACNC: 11 SEC — SIGNIFICANT CHANGE UP (ref 9.5–13)
RBC # BLD: 4.99 M/UL — SIGNIFICANT CHANGE UP (ref 4.2–5.8)
RBC # BLD: 4.99 M/UL — SIGNIFICANT CHANGE UP (ref 4.2–5.8)
RBC # FLD: 14.8 % — HIGH (ref 10.3–14.5)
RBC # FLD: 14.8 % — HIGH (ref 10.3–14.5)
SODIUM SERPL-SCNC: 140 MMOL/L — SIGNIFICANT CHANGE UP (ref 135–145)
SODIUM SERPL-SCNC: 140 MMOL/L — SIGNIFICANT CHANGE UP (ref 135–145)
TROPONIN I, HIGH SENSITIVITY RESULT: 48.3 NG/L — SIGNIFICANT CHANGE UP
TROPONIN I, HIGH SENSITIVITY RESULT: 48.3 NG/L — SIGNIFICANT CHANGE UP
WBC # BLD: 4.5 K/UL — SIGNIFICANT CHANGE UP (ref 3.8–10.5)
WBC # BLD: 4.5 K/UL — SIGNIFICANT CHANGE UP (ref 3.8–10.5)
WBC # FLD AUTO: 4.5 K/UL — SIGNIFICANT CHANGE UP (ref 3.8–10.5)
WBC # FLD AUTO: 4.5 K/UL — SIGNIFICANT CHANGE UP (ref 3.8–10.5)

## 2024-01-04 PROCEDURE — 99222 1ST HOSP IP/OBS MODERATE 55: CPT

## 2024-01-04 PROCEDURE — 99285 EMERGENCY DEPT VISIT HI MDM: CPT

## 2024-01-04 PROCEDURE — 71045 X-RAY EXAM CHEST 1 VIEW: CPT | Mod: 26

## 2024-01-04 PROCEDURE — 93010 ELECTROCARDIOGRAM REPORT: CPT

## 2024-01-04 RX ORDER — LANOLIN ALCOHOL/MO/W.PET/CERES
3 CREAM (GRAM) TOPICAL AT BEDTIME
Refills: 0 | Status: DISCONTINUED | OUTPATIENT
Start: 2024-01-04 | End: 2024-01-06

## 2024-01-04 RX ORDER — ACETAMINOPHEN 500 MG
650 TABLET ORAL EVERY 6 HOURS
Refills: 0 | Status: DISCONTINUED | OUTPATIENT
Start: 2024-01-04 | End: 2024-01-06

## 2024-01-04 RX ORDER — ONDANSETRON 8 MG/1
4 TABLET, FILM COATED ORAL EVERY 8 HOURS
Refills: 0 | Status: DISCONTINUED | OUTPATIENT
Start: 2024-01-04 | End: 2024-01-06

## 2024-01-04 RX ORDER — LISINOPRIL 2.5 MG/1
5 TABLET ORAL DAILY
Refills: 0 | Status: DISCONTINUED | OUTPATIENT
Start: 2024-01-04 | End: 2024-01-06

## 2024-01-04 RX ORDER — ATORVASTATIN CALCIUM 80 MG/1
20 TABLET, FILM COATED ORAL AT BEDTIME
Refills: 0 | Status: DISCONTINUED | OUTPATIENT
Start: 2024-01-04 | End: 2024-01-06

## 2024-01-04 RX ORDER — FINASTERIDE 5 MG/1
1 TABLET, FILM COATED ORAL
Qty: 0 | Refills: 0 | DISCHARGE

## 2024-01-04 RX ORDER — METOPROLOL TARTRATE 50 MG
50 TABLET ORAL
Refills: 0 | Status: DISCONTINUED | OUTPATIENT
Start: 2024-01-04 | End: 2024-01-06

## 2024-01-04 NOTE — ED ADULT NURSE NOTE - OBJECTIVE STATEMENT
covering for primary RN 64 y/o male with htn, high chol, history atrial flutter s/p ablation 2017, mitral and tricuspid valve repair 2016 here with palpitations today. Pt states he was in the bathroom have a BM when he felt his heart beating fast. Pt states it lasted for some time and resolved when he arrived to the ED. Pt denies chest pain, dyspnea, nausea, vomiting, abdominal pain, fever, chills. Denies recent travel, sick contact. Pt denies any dizziness.

## 2024-01-04 NOTE — H&P ADULT - ASSESSMENT
Rodney Dasilva is a 63 year old male with PMHx of HTN, HLD, hx of PVCs, hx of severe mitral regurgitation (s/p mitral valve repair in 2016), hx of tricuspid regurgitation (s/p TV repair), and hx of atrial fibrillation (s/p Maze procedure) who presented to the ED on 1/4/24 for complaints of chest palpitations and chest pain and admitted for ACS r/o and new onset atrial fibrillation.    Chest pain, ACS r/o  Complaints of ***  EKG with***, initial troponin WNL  F/u TSH, lipid panel, and A1c for risk stratification  F/u serial trops, echocardiogram  Telemetry    New onset atrial fibrillation  In px with hx of atrial flutter s/p ablation and no episodes of afib until now  Complaints of ***  EKG ***  PTA metoprolol tartrate 50 mg BID  F/u TSH, echocardiogram  Optimize electrolytes, maintain K > 4 and Mag > 2, replete PRN  Telemetry    RAHAT?  Cr 1.65 on admission, baseline ~1.1  Avoid nephrotoxins, renally dose meds  Encourage PO hydration  Monitor renal function      Chronic medical conditions:  HLD: PTA atorvastatin 20 mg  Thrombocytopenia, appears chronic: plts 129K on admission, appears around baseline, no signs of bleeding, monitor plts Rodney Dasilva is a 63 year old male with PMHx of HTN, HLD, hx of PVCs, hx of severe mitral regurgitation (s/p mitral valve repair in 2016), hx of tricuspid regurgitation (s/p TV repair), hx of atrial fibrillation (s/p Maze procedure), and BPH who presented to the ED on 1/4/24 for complaints of chest palpitations and chest pain and admitted for recurrent atrial fibrillation and RAHAT.    Recurrent atrial fibrillation  In px with hx of atrial fibrillation s/p Maze procedure and s/p ablation and no episodes of afib until now  Complaints of heart racing while urinating  EKG personally reviewed; atrial fibrillation, LVH, TWI in leads II and V6, CXR unremarkable. initial troponin WNL  CHADsVASc score 1  which is 0.6% stroke risk per year, will hold off on AC for now  PTA metoprolol tartrate 50 mg BID  F/u TSH, echocardiogram, serial trops  Optimize electrolytes, maintain K > 4 and Mag > 2, replete PRN  Telemetry  Consult cardiology given history - please call in AM    RAHAT suspect etiology prerenal   Cr 1.65 on admission, baseline ~1.1  Avoid nephrotoxins, renally dose meds  Encourage PO hydration  Monitor renal function      Chronic medical conditions:  HTN: PTA metoprolol tartrate 50 mg BID, PTA lisinopril 5 mg held due to RAHAT, please resume when renal function improves  HLD: PTA atorvastatin 20 mg  BPH: PTA tadalafil 5 mg held given not on formulary, discussed with pharmacist who recommends patient bring in own supply  Thrombocytopenia, appears chronic: plts 129K on admission, appears around baseline, no signs of bleeding, monitor plts    Medication reconciliation completed using med list provided by patient. Rodney Dasilva is a 63 year old male with PMHx of HTN, HLD, hx of PVCs, hx of severe mitral regurgitation (s/p mitral valve repair in 2016), hx of tricuspid regurgitation (s/p TV repair), hx of atrial fibrillation (s/p Maze procedure), and BPH who presented to the ED on 1/4/24 for complaints of chest palpitations and chest pain and admitted for recurrent atrial fibrillation and RAHAT.    Recurrent atrial fibrillation  In px with hx of atrial fibrillation s/p Maze procedure and s/p ablation and no episodes of afib until now  Complaints of heart racing while urinating  EKG personally reviewed; atrial fibrillation, LVH, TWI in leads II and V6, CXR unremarkable, initial troponin WNL  CHADsVASc score 1  which is 0.6% stroke risk per year, will hold off on AC for now  PTA metoprolol tartrate 50 mg BID  F/u TSH, echocardiogram, serial trops  Optimize electrolytes, maintain K > 4 and Mag > 2, replete PRN  Telemetry  Consult cardiology given history - please call in AM    RAHAT suspect etiology prerenal   Cr 1.65 on admission, baseline ~1.1  Avoid nephrotoxins, renally dose meds  Encourage PO hydration  Monitor renal function      Chronic medical conditions:  HTN: PTA metoprolol tartrate 50 mg BID, PTA lisinopril 5 mg held due to RAHAT, please resume when renal function improves  HLD: PTA atorvastatin 20 mg  BPH: PTA tadalafil 5 mg held given not on formulary, discussed with pharmacist who recommends patient bring in own supply  Thrombocytopenia, appears chronic: plts 129K on admission, appears around baseline, no signs of bleeding, monitor plts    Medication reconciliation completed using med list provided by patient.

## 2024-01-04 NOTE — ED ADULT TRIAGE NOTE - CHIEF COMPLAINT QUOTE
Pt c/o palpitation started today after work. Pmhx Atrial flutter ablation 2017, Mitral valve repair Feb 2016. HTN BPH. NKDA

## 2024-01-04 NOTE — H&P ADULT - NSHPPHYSICALEXAM_GEN_ALL_CORE
T(C): 36.4 (01-04-24 @ 20:11), Max: 36.4 (01-04-24 @ 20:11)  HR: 83 (01-04-24 @ 20:11) (83 - 83)  BP: 159/104 (01-04-24 @ 20:11) (159/104 - 159/104)  RR: 17 (01-04-24 @ 20:11) (17 - 17)  SpO2: 97% (01-04-24 @ 20:11) (97% - 97%)    CONSTITUTIONAL: Well groomed, no apparent distress  EYES: PERRLA and symmetric, EOMI  ENMT: Oral mucosa with dry membranes  RESP: No respiratory distress, no use of accessory muscles  CV: irregular rhythm, +murmur  GI: Soft, NT, ND

## 2024-01-04 NOTE — ED ADULT TRIAGE NOTE - TEMPERATURE IN FAHRENHEIT (DEGREES F)
January 2, 2024    To Whom It May Concern:         This is confirmation that Ellyn Way attended her scheduled appointment with Rafael Will M.D. on 1/02/24.  Please excuse her from days missed from work.  She is anticipated to be well enough to return by 1/4/24.  Thank you for making accommodations as she recovers.           If you have any questions please do not hesitate to call me at the phone number listed below.    Sincerely,          Rafael Will M.D.  [phone]                   97.5

## 2024-01-04 NOTE — H&P ADULT - HISTORY OF PRESENT ILLNESS
Rodney Dasilva is a 63 year old male with PMHx of HTN, HLD, hx of PVCs, hx of severe mitral regurgitation (s/p mitral valve repair in 2016), hx of tricuspid regurgitation (s/p TV repair), and hx of atrial fibrillation (s/p Maze procedure) who presented to the ED on 1/4/24 for complaints of chest palpitations and chest pain.      In the ED, labs grossly unremarkable except plts 129K and Cr 1.65. EKG ***. Troponin WNL. CXR unremarkable. Rodney Dasilva is a 63 year old male with PMHx of HTN, HLD, hx of PVCs, hx of severe mitral regurgitation (s/p mitral valve repair in 2016), hx of tricuspid regurgitation (s/p TV repair in 2016), hx of atrial fibrillation (s/p Maze procedure in 2016 and s/p ablation in 2017), and BPH who presented to the ED on 1/4/24 for complaints of chest palpitations.    Patient reports he had just gotten home from work and went to the use the bathroom. He has history of enlarged prostate so it usually takes him a minute to get the urine flowing. However, when he began to urinate, he started feeling his heart racing. Came to the ER for further evaluation. Regarding previous history of atrial fibrillation and valvular insufficiencies, he was on warfarin for anticoagulation. However, since he was in normal sinus rhythm after ablation decision was made by his cardiologist, Dr. Floyd to discontinue anticoagulation.    In the ED, labs grossly unremarkable except plts 129K and Cr 1.65. EKG with atrial fibrillation, LVH, TWI leads II and V6. Troponin WNL. CXR unremarkable. Rodney Dasilva is a 63 year old male with PMHx of HTN, HLD, hx of PVCs, hx of severe mitral regurgitation (s/p mitral valve repair in 2016), hx of tricuspid regurgitation (s/p TV repair in 2016), hx of atrial fibrillation (s/p Maze procedure in 2016 and s/p ablation in 2017), and BPH who presented to the ED on 1/4/24 for complaints of chest palpitations.    Patient reports he had just gotten home from work and went to the use the bathroom. He has history of enlarged prostate so it usually takes him a minute to get the urine flowing. However, when he began to urinate, he started feeling his heart racing. Denies associated chest pain or shortness of breath. Came to the ER for further evaluation. Regarding previous history of atrial fibrillation and valvular insufficiencies, he was on warfarin for anticoagulation. However, since he was in normal sinus rhythm after ablation decision was made by his cardiologist, Dr. Floyd to discontinue anticoagulation.    In the ED, labs grossly unremarkable except plts 129K and Cr 1.65. EKG with atrial fibrillation, LVH, TWI leads II and V6. Troponin WNL. CXR unremarkable.

## 2024-01-04 NOTE — ED PROVIDER NOTE - NS ED ROS FT
CONSTITUTIONAL: No fever, no chills, no fatigue  EYES: No visual changes  ENT: No ear pain, no sore throat  CARDIOVASCULAR: No chest pain.   RESPIRATORY: No cough, no SOB  GI: No abdominal pain, no nausea, no vomiting, no constipation, no diarrhea  GENITOURINARY: No dysuria, no frequency, no hematuria  MUSKULOSKELETAL: No backpain, no joint pain, no myalgias  SKIN: No rash  NEURO: No headache    ALL OTHER SYSTEMS NEGATIVE.

## 2024-01-04 NOTE — H&P ADULT - NSICDXPASTSURGICALHX_GEN_ALL_CORE_FT
PAST SURGICAL HISTORY:  H/O mitral valve repair     H/O tricuspid valve repair      PAST SURGICAL HISTORY:  H/O mitral valve repair     H/O tricuspid valve repair     S/P ablation of atrial fibrillation     S/P Maze operation for atrial fibrillation

## 2024-01-04 NOTE — H&P ADULT - NSHPLABSRESULTS_GEN_ALL_CORE
13.7   4.50  )-----------( 129      ( 04 Jan 2024 21:10 )             42.1   01-04    140  |  104  |  17  ----------------------------<  118<H>  4.1   |  31  |  1.65<H>    Ca    9.1      04 Jan 2024 21:10  Mg     2.1     01-04    TPro  8.1  /  Alb  3.7  /  TBili  0.8  /  DBili  x   /  AST  38<H>  /  ALT  40  /  AlkPhos  80  01-04    Chest x-ray 1/4/24  FINDINGS/IMPRESSION:  Postsurgical changes noted in the heart. No consolidation or pleural effusion. Atelectasis left lung base.    Heart size cannot be accurately assessed in this projection.

## 2024-01-04 NOTE — H&P ADULT - NSICDXPASTMEDICALHX_GEN_ALL_CORE_FT
PAST MEDICAL HISTORY:  BPH (benign prostatic hyperplasia)     History of atrial fibrillation     HLD (hyperlipidemia)     HTN (hypertension)     Mitral insufficiency

## 2024-01-04 NOTE — ED ADULT NURSE NOTE - NSFALLUNIVINTERV_ED_ALL_ED
Bed/Stretcher in lowest position, wheels locked, appropriate side rails in place/Call bell, personal items and telephone in reach/Instruct patient to call for assistance before getting out of bed/chair/stretcher/Non-slip footwear applied when patient is off stretcher/[place] to call system/Physically safe environment - no spills, clutter or unnecessary equipment/Purposeful proactive rounding/Room/bathroom lighting operational, light cord in reach Bed/Stretcher in lowest position, wheels locked, appropriate side rails in place/Call bell, personal items and telephone in reach/Instruct patient to call for assistance before getting out of bed/chair/stretcher/Non-slip footwear applied when patient is off stretcher/[place] to call system/Physically safe environment - no spills, clutter or unnecessary equipment/Purposeful proactive rounding/Room/bathroom lighting operational, light cord in reach

## 2024-01-04 NOTE — ED PROVIDER NOTE - PHYSICAL EXAMINATION
GEN: Awake, alert, interactive, NAD.  HEAD AND NECK: NC/AT. Airway patent. Neck supple.   EYES:  Clear b/l. EOMI. PERRL.   ENT: Moist mucus membranes.   CARDIAC: (+) irregular. No evident pedal edema.    RESP/CHEST: Normal respiratory effort with no use of accessory muscles or retractions. Clear throughout on auscultation.  ABD: soft, non-distended, non-tender. No rebound, no guarding.   BACK: No midline spinal TTP. No CVAT.   EXTREMITIES: Moving all extremities with no apparent deformities.   SKIN: Warm, dry, intact normal color. No rash.   NEURO: AOx3, CN II-XII grossly intact, no focal deficits.   PSYCH: Appropriate mood and affect.

## 2024-01-04 NOTE — ED PROVIDER NOTE - ATTENDING APP SHARED VISIT CONTRIBUTION OF CARE
This patient is a 63 year old man hx of tricuspid and mitral valve repair 2016 and atrial flutter s/p ablation 2017 who presents to the ER c/o palpitations.  He was sitting on the toilet moving his bowels when the symptoms began.  He denies chest pain and SOB.  Patient was concerned because the symptoms were similar to when he needed the ablation years ago.  Patient was last seen by his cardiologist in August 2023 and states that he has never had any further episodes of flutter and was never diagnosed with fibrillation.  His next appointment is in February 2024.  Currently in the ER the palpitations have resolved.  Patient in no acute distress but with persistent atrial fibrillation noted on monitor.  Lungs clear on exam and murmur noted.  CXR negative for acute pathology.  Troponin negative.  EKG shows afib no ischemia.  Patient high [id number] score and at high risk of stroke will need to be started on anticoagulation and has hx of valve repair.  Patient admitted to medicine. This patient is a 63 year old man hx of tricuspid and mitral valve repair 2016 and atrial flutter s/p ablation 2017 who presents to the ER c/o palpitations.  He was sitting on the toilet moving his bowels when the symptoms began.  He denies chest pain and SOB.  Patient was concerned because the symptoms were similar to when he needed the ablation years ago.  Patient was last seen by his cardiologist in August 2023 and states that he has never had any further episodes of flutter and was never diagnosed with fibrillation.  His next appointment is in February 2024.  Currently in the ER the palpitations have resolved.  Patient in no acute distress but with persistent atrial fibrillation noted on monitor.  Lungs clear on exam and murmur noted.  CXR negative for acute pathology.  Troponin negative.  EKG shows afib no ischemia.  Patient high [id number] score and at high risk of stroke will need to be started on anticoagulation and has hx of valve repair.  Patient admitted to medicine. This patient is a 63 year old man hx of tricuspid and mitral valve repair 2016 and atrial flutter s/p ablation 2017, HTN and CHF who presents to the ER c/o palpitations.  He was sitting on the toilet moving his bowels when the symptoms began.  He denies chest pain and SOB.  Patient was concerned because the symptoms were similar to when he needed the ablation years ago.  Patient was last seen by his cardiologist in August 2023 and states that he has never had any further episodes of flutter and was never diagnosed with fibrillation.  His next appointment is in February 2024.  Currently in the ER the palpitations have resolved.  Patient in no acute distress but with persistent atrial fibrillation noted on monitor.  Lungs clear on exam and murmur noted.  CXR negative for acute pathology.  Troponin negative.  EKG shows afib no ischemia.  Patient moderate-high [id number] score (3) and at high risk of stroke will need to be started on anticoagulation and has hx of valve repair.  Patient admitted to medicine. This patient is a 63 year old man hx of tricuspid and mitral valve repair 2016 and atrial flutter s/p ablation 2017, HTN and CHF who presents to the ER c/o palpitations.  He was sitting on the toilet moving his bowels when the symptoms began.  He denies chest pain and SOB.  Patient was concerned because the symptoms were similar to when he needed the ablation years ago.  Patient was last seen by his cardiologist in August 2023 and states that he has never had any further episodes of flutter and was never diagnosed with fibrillation.  His next appointment is in February 2024.  Currently in the ER the palpitations have resolved.  Patient in no acute distress but with persistent atrial fibrillation noted on monitor.  Lungs clear on exam and murmur noted.  CXR negative for acute pathology.  Troponin negative.  EKG shows afib no ischemia.  Patient moderate-high [id number] score (3) and at high risk of stroke will need to be started on anticoagulation and has hx of valve repair.  Patient admitted to medicine. This patient is a 63 year old man hx of tricuspid and mitral valve repair 2016 and atrial flutter s/p ablation 2017, HTN and CHF who presents to the ER c/o palpitations.  He was sitting on the toilet moving his bowels when the symptoms began.  He denies chest pain and SOB.  Patient was concerned because the symptoms were similar to when he needed the ablation years ago.  Patient was last seen by his cardiologist in August 2023 and states that he has never had any further episodes of flutter and was never diagnosed with fibrillation.  His next appointment is in February 2024.  Currently in the ER the palpitations have resolved.  Patient in no acute distress but with persistent atrial fibrillation noted on monitor.  Lungs clear on exam and murmur noted.  CXR negative for acute pathology.  Troponin negative.  EKG shows afib no ischemia.  Patient moderate-high [id number] score (3) and at high risk of stroke will need to be started on anticoagulation and has hx of valve repair.  Patient admitted to medicine-telemetry. This patient is a 63 year old man hx of tricuspid and mitral valve repair 2016 and atrial flutter s/p ablation 2017, HTN and CHF who presents to the ER c/o palpitations.  He was sitting on the toilet moving his bowels when the symptoms began.  He denies chest pain and SOB.  Patient was concerned because the symptoms were similar to when he needed the ablation years ago.  Patient was last seen by his cardiologist in August 2023 and states that he has never had any further episodes of flutter and was never diagnosed with fibrillation.  His next appointment is in February 2024.  Currently in the ER the palpitations have resolved.  Patient in no acute distress but with persistent atrial fibrillation noted on monitor.  Lungs clear on exam and murmur noted.  CXR negative for acute pathology.  Troponin negative.  EKG shows afib no ischemia.  Patient moderate-high [id number] score (3) and at high risk of stroke will need to be started on anticoagulation and has hx of valve repair.  Patient admitted to medicine-telemetry.

## 2024-01-04 NOTE — ED PROVIDER NOTE - OBJECTIVE STATEMENT
64 y/o male with htn, high chol, history atrial flutter s/p ablation 2017, mitral and tricuspid valve repair 2016 here with palpitations today. Pt states he was in the bathroom have a BM when he felt his heart beating fast. Pt states it lasted for some time and resolved when he arrived to the ED. Pt denies chest pain, dyspnea, nausea, vomiting, abdominal pain, fever, chills. Denies recent travel, sick contact. Pt denies any dizziness. 62 y/o male with htn, high chol, history atrial flutter s/p ablation 2017, mitral and tricuspid valve repair 2016 here with palpitations today. Pt states he was in the bathroom have a BM when he felt his heart beating fast. Pt states it lasted for some time and resolved when he arrived to the ED. Pt denies chest pain, dyspnea, nausea, vomiting, abdominal pain, fever, chills. Denies recent travel, sick contact. Pt denies any dizziness.

## 2024-01-04 NOTE — ED PROVIDER NOTE - NSICDXPASTMEDICALHX_GEN_ALL_CORE_FT
PAST MEDICAL HISTORY:  Atrial fibrillation     BPH (benign prostatic hyperplasia)     Hernia, femoral     Mitral insufficiency     Systolic CHF, acute on chronic

## 2024-01-05 DIAGNOSIS — I48.91 UNSPECIFIED ATRIAL FIBRILLATION: ICD-10-CM

## 2024-01-05 DIAGNOSIS — Z98.890 OTHER SPECIFIED POSTPROCEDURAL STATES: Chronic | ICD-10-CM

## 2024-01-05 DIAGNOSIS — N17.9 ACUTE KIDNEY FAILURE, UNSPECIFIED: ICD-10-CM

## 2024-01-05 DIAGNOSIS — Z98.890 OTHER SPECIFIED POSTPROCEDURAL STATES: ICD-10-CM

## 2024-01-05 LAB
A1C WITH ESTIMATED AVERAGE GLUCOSE RESULT: 6 % — HIGH (ref 4–5.6)
A1C WITH ESTIMATED AVERAGE GLUCOSE RESULT: 6 % — HIGH (ref 4–5.6)
ANION GAP SERPL CALC-SCNC: 7 MMOL/L — SIGNIFICANT CHANGE UP (ref 5–17)
ANION GAP SERPL CALC-SCNC: 7 MMOL/L — SIGNIFICANT CHANGE UP (ref 5–17)
BUN SERPL-MCNC: 19 MG/DL — SIGNIFICANT CHANGE UP (ref 7–23)
BUN SERPL-MCNC: 19 MG/DL — SIGNIFICANT CHANGE UP (ref 7–23)
CALCIUM SERPL-MCNC: 9 MG/DL — SIGNIFICANT CHANGE UP (ref 8.5–10.1)
CALCIUM SERPL-MCNC: 9 MG/DL — SIGNIFICANT CHANGE UP (ref 8.5–10.1)
CHLORIDE SERPL-SCNC: 105 MMOL/L — SIGNIFICANT CHANGE UP (ref 96–108)
CHLORIDE SERPL-SCNC: 105 MMOL/L — SIGNIFICANT CHANGE UP (ref 96–108)
CHOLEST SERPL-MCNC: 209 MG/DL — HIGH
CHOLEST SERPL-MCNC: 209 MG/DL — HIGH
CO2 SERPL-SCNC: 30 MMOL/L — SIGNIFICANT CHANGE UP (ref 22–31)
CO2 SERPL-SCNC: 30 MMOL/L — SIGNIFICANT CHANGE UP (ref 22–31)
CREAT SERPL-MCNC: 1.43 MG/DL — HIGH (ref 0.5–1.3)
CREAT SERPL-MCNC: 1.43 MG/DL — HIGH (ref 0.5–1.3)
EGFR: 55 ML/MIN/1.73M2 — LOW
EGFR: 55 ML/MIN/1.73M2 — LOW
ESTIMATED AVERAGE GLUCOSE: 126 MG/DL — HIGH (ref 68–114)
ESTIMATED AVERAGE GLUCOSE: 126 MG/DL — HIGH (ref 68–114)
GLUCOSE SERPL-MCNC: 97 MG/DL — SIGNIFICANT CHANGE UP (ref 70–99)
GLUCOSE SERPL-MCNC: 97 MG/DL — SIGNIFICANT CHANGE UP (ref 70–99)
HCT VFR BLD CALC: 39.9 % — SIGNIFICANT CHANGE UP (ref 39–50)
HCT VFR BLD CALC: 39.9 % — SIGNIFICANT CHANGE UP (ref 39–50)
HCV AB S/CO SERPL IA: 0.16 S/CO — SIGNIFICANT CHANGE UP (ref 0–0.99)
HCV AB S/CO SERPL IA: 0.16 S/CO — SIGNIFICANT CHANGE UP (ref 0–0.99)
HCV AB SERPL-IMP: SIGNIFICANT CHANGE UP
HCV AB SERPL-IMP: SIGNIFICANT CHANGE UP
HDLC SERPL-MCNC: 50 MG/DL — SIGNIFICANT CHANGE UP
HDLC SERPL-MCNC: 50 MG/DL — SIGNIFICANT CHANGE UP
HGB BLD-MCNC: 12.9 G/DL — LOW (ref 13–17)
HGB BLD-MCNC: 12.9 G/DL — LOW (ref 13–17)
LIPID PNL WITH DIRECT LDL SERPL: 149 MG/DL — HIGH
LIPID PNL WITH DIRECT LDL SERPL: 149 MG/DL — HIGH
MAGNESIUM SERPL-MCNC: 2 MG/DL — SIGNIFICANT CHANGE UP (ref 1.6–2.6)
MAGNESIUM SERPL-MCNC: 2 MG/DL — SIGNIFICANT CHANGE UP (ref 1.6–2.6)
MCHC RBC-ENTMCNC: 27.4 PG — SIGNIFICANT CHANGE UP (ref 27–34)
MCHC RBC-ENTMCNC: 27.4 PG — SIGNIFICANT CHANGE UP (ref 27–34)
MCHC RBC-ENTMCNC: 32.3 G/DL — SIGNIFICANT CHANGE UP (ref 32–36)
MCHC RBC-ENTMCNC: 32.3 G/DL — SIGNIFICANT CHANGE UP (ref 32–36)
MCV RBC AUTO: 84.7 FL — SIGNIFICANT CHANGE UP (ref 80–100)
MCV RBC AUTO: 84.7 FL — SIGNIFICANT CHANGE UP (ref 80–100)
NON HDL CHOLESTEROL: 159 MG/DL — HIGH
NON HDL CHOLESTEROL: 159 MG/DL — HIGH
NRBC # BLD: 0 /100 WBCS — SIGNIFICANT CHANGE UP (ref 0–0)
NRBC # BLD: 0 /100 WBCS — SIGNIFICANT CHANGE UP (ref 0–0)
PLATELET # BLD AUTO: 120 K/UL — LOW (ref 150–400)
PLATELET # BLD AUTO: 120 K/UL — LOW (ref 150–400)
POTASSIUM SERPL-MCNC: 3.8 MMOL/L — SIGNIFICANT CHANGE UP (ref 3.5–5.3)
POTASSIUM SERPL-MCNC: 3.8 MMOL/L — SIGNIFICANT CHANGE UP (ref 3.5–5.3)
POTASSIUM SERPL-SCNC: 3.8 MMOL/L — SIGNIFICANT CHANGE UP (ref 3.5–5.3)
POTASSIUM SERPL-SCNC: 3.8 MMOL/L — SIGNIFICANT CHANGE UP (ref 3.5–5.3)
RBC # BLD: 4.71 M/UL — SIGNIFICANT CHANGE UP (ref 4.2–5.8)
RBC # BLD: 4.71 M/UL — SIGNIFICANT CHANGE UP (ref 4.2–5.8)
RBC # FLD: 14.7 % — HIGH (ref 10.3–14.5)
RBC # FLD: 14.7 % — HIGH (ref 10.3–14.5)
SODIUM SERPL-SCNC: 142 MMOL/L — SIGNIFICANT CHANGE UP (ref 135–145)
SODIUM SERPL-SCNC: 142 MMOL/L — SIGNIFICANT CHANGE UP (ref 135–145)
T4 FREE SERPL-MCNC: 1.2 NG/DL — SIGNIFICANT CHANGE UP (ref 0.9–1.8)
T4 FREE SERPL-MCNC: 1.2 NG/DL — SIGNIFICANT CHANGE UP (ref 0.9–1.8)
TRIGL SERPL-MCNC: 58 MG/DL — SIGNIFICANT CHANGE UP
TRIGL SERPL-MCNC: 58 MG/DL — SIGNIFICANT CHANGE UP
TROPONIN I, HIGH SENSITIVITY RESULT: 49.5 NG/L — SIGNIFICANT CHANGE UP
TROPONIN I, HIGH SENSITIVITY RESULT: 49.5 NG/L — SIGNIFICANT CHANGE UP
TROPONIN I, HIGH SENSITIVITY RESULT: 65.7 NG/L — SIGNIFICANT CHANGE UP
TROPONIN I, HIGH SENSITIVITY RESULT: 65.7 NG/L — SIGNIFICANT CHANGE UP
TSH SERPL-MCNC: 4.16 UIU/ML — HIGH (ref 0.36–3.74)
TSH SERPL-MCNC: 4.16 UIU/ML — HIGH (ref 0.36–3.74)
WBC # BLD: 5.23 K/UL — SIGNIFICANT CHANGE UP (ref 3.8–10.5)
WBC # BLD: 5.23 K/UL — SIGNIFICANT CHANGE UP (ref 3.8–10.5)
WBC # FLD AUTO: 5.23 K/UL — SIGNIFICANT CHANGE UP (ref 3.8–10.5)
WBC # FLD AUTO: 5.23 K/UL — SIGNIFICANT CHANGE UP (ref 3.8–10.5)

## 2024-01-05 PROCEDURE — 99233 SBSQ HOSP IP/OBS HIGH 50: CPT

## 2024-01-05 RX ORDER — SODIUM CHLORIDE 9 MG/ML
1000 INJECTION, SOLUTION INTRAVENOUS
Refills: 0 | Status: DISCONTINUED | OUTPATIENT
Start: 2024-01-05 | End: 2024-01-06

## 2024-01-05 RX ADMIN — LISINOPRIL 5 MILLIGRAM(S): 2.5 TABLET ORAL at 05:31

## 2024-01-05 RX ADMIN — ATORVASTATIN CALCIUM 20 MILLIGRAM(S): 80 TABLET, FILM COATED ORAL at 21:31

## 2024-01-05 RX ADMIN — Medication 50 MILLIGRAM(S): at 17:15

## 2024-01-05 RX ADMIN — Medication 50 MILLIGRAM(S): at 05:31

## 2024-01-05 NOTE — PROGRESS NOTE ADULT - SUBJECTIVE AND OBJECTIVE BOX
[facility] Division of Hospital Medicine  Steven Casas MD  Available via MS Teams    SUBJECTIVE / OVERNIGHT EVENTS: No acute events overnight. Pt seen and examined at bedside. Denies any chest pain or sob.     ADDITIONAL REVIEW OF SYSTEMS:    MEDICATIONS  (STANDING):  atorvastatin 20 milliGRAM(s) Oral at bedtime  lisinopril 5 milliGRAM(s) Oral daily  metoprolol tartrate 50 milliGRAM(s) Oral two times a day    MEDICATIONS  (PRN):  acetaminophen     Tablet .. 650 milliGRAM(s) Oral every 6 hours PRN Temp greater or equal to 38C (100.4F), Mild Pain (1 - 3)  aluminum hydroxide/magnesium hydroxide/simethicone Suspension 30 milliLiter(s) Oral every 4 hours PRN Dyspepsia  melatonin 3 milliGRAM(s) Oral at bedtime PRN Insomnia  ondansetron Injectable 4 milliGRAM(s) IV Push every 8 hours PRN Nausea and/or Vomiting      I&O's Summary      PHYSICAL EXAM:  Vital Signs Last 24 Hrs  T(C): 36.6 (05 Jan 2024 11:17), Max: 36.6 (05 Jan 2024 04:38)  T(F): 97.8 (05 Jan 2024 11:17), Max: 97.8 (05 Jan 2024 04:38)  HR: 94 (05 Jan 2024 11:17) (79 - 94)  BP: 117/83 (05 Jan 2024 11:17) (117/83 - 159/104)  BP(mean): --  RR: 12 (05 Jan 2024 11:17) (12 - 17)  SpO2: 99% (05 Jan 2024 11:17) (97% - 99%)    Parameters below as of 05 Jan 2024 02:07  Patient On (Oxygen Delivery Method): room air      CONSTITUTIONAL: NAD, well appearing  EYES: PERRL; conjunctiva and sclera clear  RESPIRATORY: Normal respiratory effort; lungs are clear to auscultation bilaterally  CARDIOVASCULAR: irregularly irregular, tachycardic, s1, s2  ABDOMEN: Nontender to palpation, normoactive bowel sounds, no rebound/guarding  MUSCULOSKELETAL: no clubbing or cyanosis of digits; no joint swelling or tenderness to palpation  PSYCH: A+O to person, place, and time; affect appropriate  NEUROLOGY: CN 2-12 are intact and symmetric; no gross sensory deficits   SKIN: No rashes; no palpable lesions    LABS:                        12.9   5.23  )-----------( 120      ( 05 Jan 2024 03:58 )             39.9     01-05    142  |  105  |  19  ----------------------------<  97  3.8   |  30  |  1.43<H>    Ca    9.0      05 Jan 2024 03:58  Mg     2.0     01-05    TPro  8.1  /  Alb  3.7  /  TBili  0.8  /  DBili  x   /  AST  38<H>  /  ALT  40  /  AlkPhos  80  01-04    PT/INR - ( 04 Jan 2024 21:10 )   PT: 11.0 sec;   INR: 0.92 ratio         PTT - ( 04 Jan 2024 21:10 )  PTT:35.5 sec      Urinalysis Basic - ( 05 Jan 2024 03:58 )    Color: x / Appearance: x / SG: x / pH: x  Gluc: 97 mg/dL / Ketone: x  / Bili: x / Urobili: x   Blood: x / Protein: x / Nitrite: x   Leuk Esterase: x / RBC: x / WBC x   Sq Epi: x / Non Sq Epi: x / Bacteria: x    RADIOLOGY & ADDITIONAL TESTS:  New Results Reviewed Today:   New Imaging Personally Reviewed Today:  New Electrocardiogram Personally Reviewed Today:  Prior or Outpatient Records Reviewed Today:    COMMUNICATION:  Care Discussed with Consultants/Other Providers and Details of Discussion: Discussed with ACP  Discussions with Patient/Family:  PCP Communication: [facility] Division of Hospital Medicine  Steven Casas MD  Available via MS Teams    SUBJECTIVE / OVERNIGHT EVENTS: No acute events overnight. Pt seen and examined at bedside. Denies any chest pain or sob.     ADDITIONAL REVIEW OF SYSTEMS:    MEDICATIONS  (STANDING):  atorvastatin 20 milliGRAM(s) Oral at bedtime  lisinopril 5 milliGRAM(s) Oral daily  metoprolol tartrate 50 milliGRAM(s) Oral two times a day    MEDICATIONS  (PRN):  acetaminophen     Tablet .. 650 milliGRAM(s) Oral every 6 hours PRN Temp greater or equal to 38C (100.4F), Mild Pain (1 - 3)  aluminum hydroxide/magnesium hydroxide/simethicone Suspension 30 milliLiter(s) Oral every 4 hours PRN Dyspepsia  melatonin 3 milliGRAM(s) Oral at bedtime PRN Insomnia  ondansetron Injectable 4 milliGRAM(s) IV Push every 8 hours PRN Nausea and/or Vomiting      I&O's Summary      PHYSICAL EXAM:  Vital Signs Last 24 Hrs  T(C): 36.6 (05 Jan 2024 11:17), Max: 36.6 (05 Jan 2024 04:38)  T(F): 97.8 (05 Jan 2024 11:17), Max: 97.8 (05 Jan 2024 04:38)  HR: 94 (05 Jan 2024 11:17) (79 - 94)  BP: 117/83 (05 Jan 2024 11:17) (117/83 - 159/104)  BP(mean): --  RR: 12 (05 Jan 2024 11:17) (12 - 17)  SpO2: 99% (05 Jan 2024 11:17) (97% - 99%)    Parameters below as of 05 Jan 2024 02:07  Patient On (Oxygen Delivery Method): room air      CONSTITUTIONAL: NAD, well appearing  EYES: PERRL; conjunctiva and sclera clear  RESPIRATORY: Normal respiratory effort; lungs are clear to auscultation bilaterally  CARDIOVASCULAR: irregularly irregular, tachycardic, s1, s2  ABDOMEN: Nontender to palpation, normoactive bowel sounds, no rebound/guarding  MUSCULOSKELETAL: no clubbing or cyanosis of digits; no joint swelling or tenderness to palpation  PSYCH: A+O to person, place, and time; affect appropriate  NEUROLOGY: CN 2-12 are intact and symmetric; no gross sensory deficits   SKIN: No rashes; no palpable lesions    LABS:                        12.9   5.23  )-----------( 120      ( 05 Jan 2024 03:58 )             39.9     01-05    142  |  105  |  19  ----------------------------<  97  3.8   |  30  |  1.43<H>    Ca    9.0      05 Jan 2024 03:58  Mg     2.0     01-05    TPro  8.1  /  Alb  3.7  /  TBili  0.8  /  DBili  x   /  AST  38<H>  /  ALT  40  /  AlkPhos  80  01-04    PT/INR - ( 04 Jan 2024 21:10 )   PT: 11.0 sec;   INR: 0.92 ratio         PTT - ( 04 Jan 2024 21:10 )  PTT:35.5 sec      Urinalysis Basic - ( 05 Jan 2024 03:58 )    Color: x / Appearance: x / SG: x / pH: x  Gluc: 97 mg/dL / Ketone: x  / Bili: x / Urobili: x   Blood: x / Protein: x / Nitrite: x   Leuk Esterase: x / RBC: x / WBC x   Sq Epi: x / Non Sq Epi: x / Bacteria: x    RADIOLOGY & ADDITIONAL TESTS:  New Results Reviewed Today:   New Imaging Personally Reviewed Today:  New Electrocardiogram Personally Reviewed Today:  Prior or Outpatient Records Reviewed Today:    COMMUNICATION:  Care Discussed with Consultants/Other Providers and Details of Discussion: Discussed with ACP  Discussions with Patient/Family:  PCP Communication:

## 2024-01-05 NOTE — PATIENT PROFILE ADULT - FALL HARM RISK - UNIVERSAL INTERVENTIONS
Bed in lowest position, wheels locked, appropriate side rails in place/Call bell, personal items and telephone in reach/Instruct patient to call for assistance before getting out of bed or chair/Non-slip footwear when patient is out of bed/[place] to call system/Physically safe environment - no spills, clutter or unnecessary equipment/Purposeful Proactive Rounding/Room/bathroom lighting operational, light cord in reach Bed in lowest position, wheels locked, appropriate side rails in place/Call bell, personal items and telephone in reach/Instruct patient to call for assistance before getting out of bed or chair/Non-slip footwear when patient is out of bed/[place] to call system/Physically safe environment - no spills, clutter or unnecessary equipment/Purposeful Proactive Rounding/Room/bathroom lighting operational, light cord in reach

## 2024-01-05 NOTE — PATIENT PROFILE ADULT - OVER THE PAST TWO WEEKS HAVE YOU FELT DOWN, DEPRESSED OR HOPELESS?
Daily Note     Today's date: 2022  Patient name: Berta Ervin  : 1945  MRN: [id number]  Referring provider: Kirstin Aadn*  
no

## 2024-01-05 NOTE — PATIENT PROFILE ADULT - NSPROMEDSADMININFO_GEN_A_NUR

## 2024-01-05 NOTE — PROGRESS NOTE ADULT - ASSESSMENT
Rodney Dasilva is a 63 year old male with PMHx of HTN, HLD, hx of PVCs, hx of severe mitral regurgitation (s/p mitral valve repair in 2016), hx of tricuspid regurgitation (s/p TV repair), hx of atrial fibrillation (s/p Maze procedure), and BPH who presented to the ED on 1/4/24 for complaints of chest palpitations and chest pain and admitted for recurrent atrial fibrillation and RAHAT.    Recurrent atrial fibrillation  In px with hx of atrial fibrillation s/p Maze procedure and s/p ablation and no episodes of afib until now  Complaints of heart racing while urinating  Trpops now downtrending  CHADsVASc score 1 which is 0.6% stroke risk per year, will hold off on AC for now  PTA metoprolol tartrate 50 mg BID  F/u TSH, echocardiogram, serial trops  Optimize electrolytes, maintain K > 4 and Mag > 2, replete PRN  Telemetry  Needs outpatient follow-up with EP    RAHAT suspect etiology prerenal   Cr 1.65 on admission, baseline ~1.1  Avoid nephrotoxins, renally dose meds  Encourage PO hydration  LR 75cc x 12 hrs ordered  Trend renal function  Monitor renal function    Chronic medical conditions:  HTN: PTA metoprolol tartrate 50 mg BID, PTA lisinopril 5 mg  HLD: PTA atorvastatin 20 mg  BPH: PTA tadalafil 5 mg held given not on formulary, discussed with pharmacist who recommends patient bring in own supply  Thrombocytopenia, appears chronic: plts 129K on admission, appears around baseline, no signs of bleeding, monitor plts

## 2024-01-06 ENCOUNTER — TRANSCRIPTION ENCOUNTER (OUTPATIENT)
Age: 64
End: 2024-01-06

## 2024-01-06 VITALS
TEMPERATURE: 98 F | OXYGEN SATURATION: 97 % | HEART RATE: 75 BPM | SYSTOLIC BLOOD PRESSURE: 138 MMHG | RESPIRATION RATE: 18 BRPM | DIASTOLIC BLOOD PRESSURE: 82 MMHG

## 2024-01-06 PROCEDURE — 99239 HOSP IP/OBS DSCHRG MGMT >30: CPT

## 2024-01-06 RX ADMIN — LISINOPRIL 5 MILLIGRAM(S): 2.5 TABLET ORAL at 05:12

## 2024-01-06 RX ADMIN — SODIUM CHLORIDE 75 MILLILITER(S): 9 INJECTION, SOLUTION INTRAVENOUS at 00:16

## 2024-01-06 NOTE — DISCHARGE NOTE NURSING/CASE MANAGEMENT/SOCIAL WORK - PATIENT PORTAL LINK FT
You can access the FollowMyHealth Patient Portal offered by [facility] by registering at the following website: [email or web address]. By joining [facility]’s FollowMyHealth portal, you will also be able to view your health information using other applications (apps) compatible with our system. You can access the FollowMyHealth Patient Portal offered by [facility] by registering at the following website: [email or web address]. By joining [facility]’s FollowMyHealth portal, you will also be able to view your health information using other applications (apps) compatible with our system.

## 2024-01-06 NOTE — DISCHARGE NOTE NURSING/CASE MANAGEMENT/SOCIAL WORK - NSDPDISTO_GEN_ALL_CORE
Last sports physical in October so insurance won't likely pay for another until this coming October  Please confirm no new sports injuries, difficulty with exercise, new concussions in the past year and confirm no history of COVID in the past 3 months  I can fill out the form for her if above questions are normal but would still need to come in October for her routine physical   Home

## 2024-01-06 NOTE — DISCHARGE NOTE PROVIDER - NSDCMRMEDTOKEN_GEN_ALL_CORE_FT
atorvastatin 20 mg oral tablet: 1 tab(s) orally once a day (at bedtime)  lisinopril 5 mg oral tablet: 1 tab(s) orally once a day  metoprolol tartrate 50 mg oral tablet: 1 tab(s) orally 2 times a day  tadalafil 5 mg oral tablet: 1 tab(s) orally once a day

## 2024-01-06 NOTE — DISCHARGE NOTE PROVIDER - NSDCFUSCHEDAPPT_GEN_ALL_CORE_FT
Kenia Valentino  [facility]  CARDIOLOGY [address] [facility]  Scheduled Appointment: 02/16/2024     Kenia Valentino  [facility]  CARDIOLOGY [address] [facility]  Scheduled Appointment: 02/16/2024

## 2024-01-06 NOTE — DISCHARGE NOTE NURSING/CASE MANAGEMENT/SOCIAL WORK - NSDCPEFALRISK_GEN_ALL_CORE
For information on Fall & Injury Prevention, visit: [email or web address] OR  [email or web address] OR  https://www.cdc.gov/steadi/patient.html For information on Fall & Injury Prevention, visit: [email or web address] OR  [email or web address] OR  https://www.cdc.gov/steadi/patient.html

## 2024-01-06 NOTE — DISCHARGE NOTE PROVIDER - HOSPITAL COURSE
63 year old male with PMHx of HTN, HLD, hx of PVCs, hx of severe mitral regurgitation (s/p mitral valve repair in 2016), hx of tricuspid regurgitation (s/p TV repair), hx of atrial fibrillation (s/p Maze procedure), and BPH who presented to the ED on 1/4/24 for complaints of chest palpitations and chest pain and admitted for recurrent atrial fibrillation and RAHAT.    Recurrent atrial fibrillation  In px with hx of atrial fibrillation s/p Maze procedure and s/p ablation and no episodes of afib until now  Complaints of heart racing while urinating  Trpops now downtrending  CHADsVASc score 1 which is 0.6% stroke risk per year, will hold off on AC for now  PTA metoprolol tartrate 50 mg BID  patient converted to sinus rhythm and is not at baseline, denies cp or discomfort. Wants to f/u as out pt w his cardo dr simeon, and prefers to do echo as outpt       RAHAT suspect etiology prerenal   Cr 1.65 on admission, baseline ~1.1  improved stable, f/u as outpt     Chronic medical conditions:  HTN: PTA metoprolol tartrate 50 mg BID, PTA lisinopril 5 mg  HLD: PTA atorvastatin 20 mg  BPH: PTA tadalafil 5 mg held given not on formulary, discussed with pharmacist who recommends patient bring in own supply  Thrombocytopenia, appears chronic: plts 129K on admission, appears around baseline, no signs of bleeding, monitor plts        pt seen and examined 45 min spent on dc planning     Lab test review, Radiology Review, Vitals review, Consultant review and discussion, Physical examination, IDR, Assessment and plan; Plan discussion with patient and family

## 2024-01-06 NOTE — CHART NOTE - NSCHARTNOTEFT_GEN_A_CORE
Mr. Dasilva was admitted to [facility] [place] on 1/4/24 with medical condition requiring inpatient care,  he was discharged on 1/6/2024 after inpatient medical care Mr. Dasilva was admitted to [facility] on 1/4/24 with medical condition requiring inpatient care,  he was discharged on 1/6/2024 after inpatient medical care

## 2024-01-10 DIAGNOSIS — I10 ESSENTIAL (PRIMARY) HYPERTENSION: ICD-10-CM

## 2024-01-10 DIAGNOSIS — N17.9 ACUTE KIDNEY FAILURE, UNSPECIFIED: ICD-10-CM

## 2024-01-10 DIAGNOSIS — I48.91 UNSPECIFIED ATRIAL FIBRILLATION: ICD-10-CM

## 2024-01-10 DIAGNOSIS — N40.0 BENIGN PROSTATIC HYPERPLASIA WITHOUT LOWER URINARY TRACT SYMPTOMS: ICD-10-CM

## 2024-01-10 DIAGNOSIS — D69.6 THROMBOCYTOPENIA, UNSPECIFIED: ICD-10-CM

## 2024-01-10 DIAGNOSIS — E78.00 PURE HYPERCHOLESTEROLEMIA, UNSPECIFIED: ICD-10-CM

## 2024-02-16 ENCOUNTER — APPOINTMENT (OUTPATIENT)
Dept: CARDIOLOGY | Facility: CLINIC | Age: 64
End: 2024-02-16

## 2024-02-16 ENCOUNTER — APPOINTMENT (OUTPATIENT)
Dept: CARDIOLOGY | Facility: CLINIC | Age: 64
End: 2024-02-16
Payer: COMMERCIAL

## 2024-02-16 ENCOUNTER — NON-APPOINTMENT (OUTPATIENT)
Age: 64
End: 2024-02-16

## 2024-02-16 VITALS
WEIGHT: 172 LBS | HEIGHT: 66 IN | SYSTOLIC BLOOD PRESSURE: 160 MMHG | OXYGEN SATURATION: 98 % | DIASTOLIC BLOOD PRESSURE: 100 MMHG | BODY MASS INDEX: 27.64 KG/M2 | HEART RATE: 56 BPM

## 2024-02-16 DIAGNOSIS — I49.3 VENTRICULAR PREMATURE DEPOLARIZATION: ICD-10-CM

## 2024-02-16 PROBLEM — E78.5 HYPERLIPIDEMIA, UNSPECIFIED: Chronic | Status: ACTIVE | Noted: 2024-01-04

## 2024-02-16 PROBLEM — Z86.79 PERSONAL HISTORY OF OTHER DISEASES OF THE CIRCULATORY SYSTEM: Chronic | Status: ACTIVE | Noted: 2024-01-04

## 2024-02-16 PROBLEM — I10 ESSENTIAL (PRIMARY) HYPERTENSION: Chronic | Status: ACTIVE | Noted: 2024-01-04

## 2024-02-16 PROCEDURE — 99214 OFFICE O/P EST MOD 30 MIN: CPT | Mod: 25

## 2024-02-16 PROCEDURE — 93246 EXT ECG>7D<15D RECORDING: CPT

## 2024-02-16 PROCEDURE — G2211 COMPLEX E/M VISIT ADD ON: CPT | Mod: NC,1L

## 2024-02-16 PROCEDURE — 93000 ELECTROCARDIOGRAM COMPLETE: CPT

## 2024-02-16 RX ORDER — ASPIRIN ENTERIC COATED TABLETS 81 MG 81 MG/1
81 TABLET, DELAYED RELEASE ORAL DAILY
Qty: 90 | Refills: 3 | Status: ACTIVE | COMMUNITY
Start: 2024-02-16 | End: 1900-01-01

## 2024-02-16 NOTE — DISCUSSION/SUMMARY
[FreeTextEntry1] : 63 year old male with PMHx of HTN, HLD, hx of PVCs, hx of severe mitral regurgitation (s/p mitral valve repair in 2016), hx of tricuspid regurgitation (s/p TV repair), hx of atrial fibrillation (s/p Maze procedure), and BPH who presented to the ED on 1/4/24 for complaints of chest palpitations and chest pain and admitted for recurrent atrial fibrillation and RAHAT. CHADsVASc score 1 which is 0.6% stroke risk per year, will hold off on AC Converted to sinus rhythm Denies cp or discomfort  -2D echo... significant mitral murmur on exam -14d Zio to eval afib burden -START ASA 81mg -f/u in 1 month [EKG obtained to assist in diagnosis and management of assessed problem(s)] : EKG obtained to assist in diagnosis and management of assessed problem(s)

## 2024-02-16 NOTE — HISTORY OF PRESENT ILLNESS
[FreeTextEntry1] : 63 year old male with PMHx of HTN, HLD, hx of PVCs, hx of severe mitral regurgitation (s/p mitral valve repair in 2016), hx of tricuspid regurgitation (s/p TV repair), hx of atrial fibrillation (s/p Maze procedure), and BPH who presented to the ED on 1/4/24 for complaints of chest palpitations and chest pain and admitted for recurrent atrial fibrillation and RAHAT. CHADsVASc score 1 which is 0.6% stroke risk per year, will hold off on AC Converted to sinus rhythm Denies cp or discomfort

## 2024-02-16 NOTE — PHYSICAL EXAM

## 2024-02-21 NOTE — PATIENT PROFILE ADULT. - SOCIAL CONCERNS
Reason for visit: Routine OB visit at 19w1d. [place] 2024, by Last Menstrual Period    CC:  Patient is having a migraine today. She reports headaches almost daily. Denies VB, LOF, pelvic pain, or cramping.     ROS: All systems reviewed and are negative with exception of the following: headache, nausea, vomiting    Wt 225 lb for a TWG of -4.99 kg (-11 lb), /76, FHTs 158  Urine today and reviewed: negative glucose, trace protein    Anatomy Scan: scheduled for 2024 at 1240    Exam:  General Appearance:  Healthy appearing . Normal mood and behavior.  HEENT: NCAT, EOMI  HR str and reg. Lungs clear. Resp even and unlabored.  Abdomen is soft and nontender. No CVA tenderness. Uterus is consistent with EGA  Ext: no edema, nontender, no trauma, or cyanosis.    Impression  Diagnoses and all orders for this visit:    1. 19 weeks gestation of pregnancy (Primary)  -     POC Urinalysis Dipstick  -     metoclopramide (Reglan) 10 MG tablet; Take 1 tablet by mouth 4 (Four) Times a Day.  Dispense: 120 tablet; Refill: 1  -     aspirin 81 MG EC tablet; Take 1 tablet by mouth Daily.  Dispense: 90 tablet; Refill: 3    2. Multigravida in second trimester  Discussed second trimester of pregnancy, discomforts and measures of support, fetal movement, pelvic pain warnings, bleeding warnings, and signs and symptoms to report. Discussed and encouraged to call or come to the hospital with vaginal bleeding, leaking of fluid, pelvic pain, or other concerns. Second Trimester of Pregnancy video and Round Ligament Pain education included in the AVS.    3. Chronic hypertension during pregnancy  Patient had been prescribed labetalol at her last OB visit with previous provider, but she never started the medication per her report. Discussed initiating Aspirin 81 mg daily. Will complete review of initial prenatal records and order baseline hypertension related labs if indicated.   -     aspirin 81 MG EC tablet; Take 1 tablet by mouth  Daily.  Dispense: 90 tablet; Refill: 3    4. Headache in pregnancy, antepartum  Discussed headache relief measures: avoiding triggers, aromatherapy, Tylenol OTC, Tylenol + Benadryl and possibly a small amount of caffeine, Magnesium, epsom salt bath for relaxation, hot/cold compresses, relaxation techniques, OMT consultation, allergy medications if sinus/allergy related, massage, chiropractor, and prescription medications. Encouraged Tylenol + Reglan for treatment with headache. Discussed hypertension signs to report. Prescription for Reglan discussed and sent to the pharmacy. Migraine Headache education included in the AVS. Patient aware to not use ibuprofen.   -     metoclopramide (Reglan) 10 MG tablet; Take 1 tablet by mouth 4 (Four) Times a Day.  Dispense: 120 tablet; Refill: 1    5. Immunization counseling  Discussed influenza vaccine recommendations during pregnancy. Patient declines to receive the influenza immunization this prenatal visit. Reviewed immune health practices and self-care measures to promote well-being. Encouraged screening and testing if patient experiences influenza-like illness symptoms for treatment with Tamiflu if indicated. Influenza (Flu) Vaccine (Inactivated or Recombinant): What You Need to Know (VIS) education included in the AVS.    6. Influenza vaccination declined          This note has been signed electronically.    Swati Gallo, DNP, APRN, CNM, RNC-OB     None

## 2024-03-22 ENCOUNTER — APPOINTMENT (OUTPATIENT)
Dept: CARDIOLOGY | Facility: CLINIC | Age: 64
End: 2024-03-22
Payer: COMMERCIAL

## 2024-03-22 ENCOUNTER — NON-APPOINTMENT (OUTPATIENT)
Age: 64
End: 2024-03-22

## 2024-03-22 VITALS
WEIGHT: 168 LBS | BODY MASS INDEX: 27 KG/M2 | OXYGEN SATURATION: 98 % | SYSTOLIC BLOOD PRESSURE: 158 MMHG | HEIGHT: 66 IN | HEART RATE: 49 BPM | DIASTOLIC BLOOD PRESSURE: 98 MMHG

## 2024-03-22 DIAGNOSIS — I51.9 HEART DISEASE, UNSPECIFIED: ICD-10-CM

## 2024-03-22 DIAGNOSIS — R94.31 ABNORMAL ELECTROCARDIOGRAM [ECG] [EKG]: ICD-10-CM

## 2024-03-22 DIAGNOSIS — I48.91 UNSPECIFIED ATRIAL FIBRILLATION: ICD-10-CM

## 2024-03-22 PROCEDURE — 99214 OFFICE O/P EST MOD 30 MIN: CPT | Mod: 25

## 2024-03-22 PROCEDURE — 93000 ELECTROCARDIOGRAM COMPLETE: CPT

## 2024-03-22 PROCEDURE — 93306 TTE W/DOPPLER COMPLETE: CPT

## 2024-03-22 PROCEDURE — G2211 COMPLEX E/M VISIT ADD ON: CPT | Mod: NC,1L

## 2024-03-22 RX ORDER — AMOXICILLIN 500 MG/1
500 TABLET, FILM COATED ORAL
Qty: 8 | Refills: 3 | Status: DISCONTINUED | COMMUNITY
Start: 2023-08-08 | End: 2024-03-22

## 2024-03-22 NOTE — REASON FOR VISIT
[Symptom and Test Evaluation] : symptom and test evaluation [Arrhythmia/ECG Abnorrmalities] : arrhythmia/ECG abnormalities [Structural Heart and Valve Disease] : structural heart and valve disease [Hypertension] : hypertension

## 2024-03-22 NOTE — PHYSICAL EXAM

## 2024-03-22 NOTE — DISCUSSION/SUMMARY
[Severe Mitral Regurgitation] : severe mitral regurgitation [Deteriorating] : deteriorating [None] : There are no changes in medication management [de-identified] : Referral to CTS [Patient] : the patient [FreeTextEntry1] : 63 year old male with PMHx of HTN, HLD, hx of PVCs, hx of severe mitral regurgitation (s/p mitral valve repair in 2016), hx of tricuspid regurgitation (s/p TV repair), hx of atrial fibrillation (s/p Maze procedure), and BPH who presented to the ED on 1/4/24 for complaints of chest palpitations and chest pain and admitted for recurrent atrial fibrillation and RAHAT. CHADsVASc score 1 which is 0.6% stroke risk per year, will hold off on AC Converted to sinus rhythm Denies cp or discomfort  -2D echo... significant mitral murmur on exam -14d Zio to eval afib burden -START ASA 81mg -f/u in 1 month [EKG obtained to assist in diagnosis and management of assessed problem(s)] : EKG obtained to assist in diagnosis and management of assessed problem(s)

## 2024-03-22 NOTE — HISTORY OF PRESENT ILLNESS
[FreeTextEntry1] : 63 year old male with PMHx of HTN, HLD, hx of PVCs, hx of severe mitral regurgitation (s/p mitral valve repair in 2016), hx of tricuspid regurgitation (s/p TV repair), hx of atrial fibrillation (s/p Maze procedure), and BPH who presented to the ED on 1/4/24 for complaints of chest palpitations and chest pain and admitted for recurrent atrial fibrillation and RAHAT. CHADsVASc score 1 which is 0.6% stroke risk per year, will hold off on AC Converted to sinus rhythm Denies cp or discomfort  03/22/2024: Presents today for follow-up to echocardiogram. Patient denies any CP, SOB, Palpitations, Lightheadedness, dizziness today. Preliminary read of echocardiogram show severe MR and moderate TR. Referral to CTS either Dr. Holguin, or Dr. Westbrook awaiting patient decision.

## 2024-03-22 NOTE — CARDIOLOGY SUMMARY
[de-identified] : 03/22/24 Sinus Bradycardia  Voltage criteria for LVH (S(V1)+R(V6) exceeds 3.50 mV). -Nonspecific T-abnormality.

## 2024-04-02 ENCOUNTER — APPOINTMENT (OUTPATIENT)
Dept: CARDIOTHORACIC SURGERY | Facility: CLINIC | Age: 64
End: 2024-04-02
Payer: COMMERCIAL

## 2024-04-02 VITALS
HEART RATE: 59 BPM | WEIGHT: 168 LBS | OXYGEN SATURATION: 98 % | SYSTOLIC BLOOD PRESSURE: 160 MMHG | HEIGHT: 66 IN | RESPIRATION RATE: 16 BRPM | BODY MASS INDEX: 27 KG/M2 | DIASTOLIC BLOOD PRESSURE: 85 MMHG | TEMPERATURE: 208.4 F

## 2024-04-02 VITALS — HEIGHT: 66 IN | WEIGHT: 168 LBS | BODY MASS INDEX: 27 KG/M2

## 2024-04-02 DIAGNOSIS — N17.9 ACUTE KIDNEY FAILURE, UNSPECIFIED: ICD-10-CM

## 2024-04-02 DIAGNOSIS — Z86.79 OTHER SPECIFIED POSTPROCEDURAL STATES: ICD-10-CM

## 2024-04-02 DIAGNOSIS — Z98.890 OTHER SPECIFIED POSTPROCEDURAL STATES: ICD-10-CM

## 2024-04-02 PROCEDURE — 99204 OFFICE O/P NEW MOD 45 MIN: CPT

## 2024-04-02 PROCEDURE — 99214 OFFICE O/P EST MOD 30 MIN: CPT

## 2024-04-02 RX ORDER — TADALAFIL 5 MG/1
5 TABLET ORAL
Refills: 0 | Status: COMPLETED | COMMUNITY
End: 2024-04-02

## 2024-04-03 NOTE — REVIEW OF SYSTEMS
[Feeling Tired] : feeling tired [Palpitations] : palpitations [Nosebleeds] : no nosebleeds [Chest Pain] : no chest pain [SOB on Exertion] : no shortness of breath during exertion [Constipation] : no constipation [Diarrhea] : no diarrhea [Hesitancy] : no urinary hesitancy [Itching] : no itching [Dizziness] : no dizziness [Anxiety] : no anxiety [Proptosis] : no proptosis [Easy Bleeding] : no tendency for easy bleeding

## 2024-04-03 NOTE — ASSESSMENT
[FreeTextEntry1] : 63-year-old male followed and referred by Dr. Valentino for evaluation of MR.. His PMH includes HTN, HLD, hx of severe mitral regurgitation, s/p Minimally invasive MV repair, TV repair, cryoablation maze and UTE ligation on 2/19/2016 with Dr. Jozef Holguin. He has been followed by Dr. Floyd since.  He presented to ED in January of this year for CP and palpitations where he was noted to be back in afib and RAHAT (Cr 1.65). Trops were negative and down trending, he converted back to SR and AC was held given CHADSVAC of 1 and prior UTE ligation.    He followed up with Alhaji Sanchez with TTE 3/22/24 that revealed severe MR per note.  Today he feels well, chest pain, shortness of breath, dyspnea on exertion, orthopnea, paroxysmal nocturnal dyspnea, claudication, pre syncopal, or syncopal symptoms. On prompting he reports occasional palpitations.

## 2024-04-03 NOTE — PHYSICAL EXAM
[General Appearance - Alert] : alert [General Appearance - In No Acute Distress] : in no acute distress [Hearing Threshold Finger Rub Not [place]] : hearing was normal [Jugular Venous Distention Increased] : there was no jugular-venous distention [Respiration, Rhythm And Depth] : normal respiratory rhythm and effort [Auscultation Breath Sounds / Voice Sounds] : lungs were clear to auscultation bilaterally [Systolic grade ___/6] : A grade [unfilled]/6 systolic murmur was heard. [Bowel Sounds] : normal bowel sounds [Cervical Lymph Nodes Enlarged Posterior Bilaterally] : posterior cervical [No CVA Tenderness] : no ~M costovertebral angle tenderness [Involuntary Movements] : no involuntary movements were seen [Skin Color & Pigmentation] : normal skin color and pigmentation [No Focal Deficits] : no focal deficits [Oriented To Time, Place, And Person] : oriented to person, place, and time [Right Carotid Bruit] : no bruit heard over the right carotid [Left Carotid Bruit] : no bruit heard over the left carotid [FreeTextEntry1] : left anterior chest wall healed incision- lateral wall healed incisions

## 2024-04-03 NOTE — END OF VISIT
[FreeTextEntry3] : I, Dr. Brian Westbrook, personally performed the evaluation and management (E/M) services for this new patient.  That E/M includes conducting the initial examination, assessing all conditions, and establishing the plan of care.  Today, Chantel Boone NP was here to observe my evaluation and management services for this patient to be followed going forward.

## 2024-04-03 NOTE — ADDENDUM
[FreeTextEntry1] : We will arrange a KEVIN and then discuss the potential options in an appointment after this.

## 2024-04-03 NOTE — HISTORY OF PRESENT ILLNESS
[FreeTextEntry1] : 63 year old male followed and referred by Dr. Valentino for evaluation of MR..  PMHx includes HTN, HLD, hx of severe mitral regurgitation,, s/p Minimally invasive MV repair, TV repair, cryoablation maze and UTE ligation on 2/19/2016 with Dr. Jozef Holguin. He has been followed by Dr. Floyd since.  He presented to ED in January of this year for CP and palpitations where he was noted to be back in afib and RAHAT (Cr 1.65). Trops were negative and downtrending, he converted back to SR and AC was held given CHADSVAC of 1 and prior UTE ligation.  He followed up with Azam Sanchez with TTE 3/22/24 that revealed severe MR per note (TTE report??)

## 2024-04-03 NOTE — DATA REVIEWED
[FreeTextEntry1] : ECG: Sinus rhythm with LVH and nonspecific ST-T wave abnormalities.  TTE on 3/22/2024 Left ventricular cavity is normal in size. Left ventricular wall thickness is normal. Left ventricular systolic function is normal with an ejection fraction of 68 % by Villar's method of disks. There are no regional wall motion abnormalities seen. Normal left ventricular diastolic function, with normal filling pressure. Normal right ventricular cavity size, with normal wall thickness, and normal systolic function.  Mild left ventricular hypertrophy.  The left atrium is moderately dilated.  Moderate to severe mitral regurgitation. Mitral inflow is E-wave dominant (>1.2m/sec).  Moderate mitral valve leaflet calcification. There is calcification and severe calcification of the mitral valve annulus.  Mitral valve valvuloplasty noted. Tricuspid valve leaflets appear mildly thickened. Mild to moderate tricuspid regurgitation.  Mild aortic regurgitation.

## 2024-04-19 ENCOUNTER — EMERGENCY (EMERGENCY)
Facility: HOSPITAL | Age: 64
LOS: 0 days | Discharge: HOME | End: 2024-04-20
Attending: STUDENT IN AN ORGANIZED HEALTH CARE EDUCATION/TRAINING PROGRAM
Payer: COMMERCIAL

## 2024-04-19 ENCOUNTER — APPOINTMENT (OUTPATIENT)
Dept: CV DIAGNOSITCS | Facility: HOSPITAL | Age: 64
End: 2024-04-19

## 2024-04-19 ENCOUNTER — RESULT REVIEW (OUTPATIENT)
Age: 64
End: 2024-04-19

## 2024-04-19 ENCOUNTER — OUTPATIENT (OUTPATIENT)
Dept: OUTPATIENT SERVICES | Facility: HOSPITAL | Age: 64
LOS: 1 days | End: 2024-04-19
Payer: MEDICAID

## 2024-04-19 VITALS
DIASTOLIC BLOOD PRESSURE: 87 MMHG | TEMPERATURE: 98 F | OXYGEN SATURATION: 98 % | HEART RATE: 67 BPM | RESPIRATION RATE: 17 BRPM | SYSTOLIC BLOOD PRESSURE: 149 MMHG

## 2024-04-19 VITALS
HEART RATE: 83 BPM | OXYGEN SATURATION: 98 % | DIASTOLIC BLOOD PRESSURE: 72 MMHG | SYSTOLIC BLOOD PRESSURE: 113 MMHG | RESPIRATION RATE: 16 BRPM | TEMPERATURE: 97 F

## 2024-04-19 VITALS
SYSTOLIC BLOOD PRESSURE: 118 MMHG | OXYGEN SATURATION: 98 % | RESPIRATION RATE: 16 BRPM | HEART RATE: 66 BPM | DIASTOLIC BLOOD PRESSURE: 89 MMHG

## 2024-04-19 VITALS
HEIGHT: 67 IN | TEMPERATURE: 98 F | HEART RATE: 87 BPM | OXYGEN SATURATION: 100 % | SYSTOLIC BLOOD PRESSURE: 201 MMHG | WEIGHT: 162.04 LBS | DIASTOLIC BLOOD PRESSURE: 141 MMHG | RESPIRATION RATE: 19 BRPM

## 2024-04-19 DIAGNOSIS — Z98.890 OTHER SPECIFIED POSTPROCEDURAL STATES: Chronic | ICD-10-CM

## 2024-04-19 DIAGNOSIS — N40.1 BENIGN PROSTATIC HYPERPLASIA WITH LOWER URINARY TRACT SYMPTOMS: ICD-10-CM

## 2024-04-19 DIAGNOSIS — R33.9 RETENTION OF URINE, UNSPECIFIED: ICD-10-CM

## 2024-04-19 DIAGNOSIS — Z98.890 OTHER SPECIFIED POSTPROCEDURAL STATES: ICD-10-CM

## 2024-04-19 DIAGNOSIS — R33.8 OTHER RETENTION OF URINE: ICD-10-CM

## 2024-04-19 LAB
APPEARANCE UR: CLEAR — SIGNIFICANT CHANGE UP
BACTERIA # UR AUTO: ABNORMAL /HPF
BILIRUB UR-MCNC: NEGATIVE — SIGNIFICANT CHANGE UP
COLOR SPEC: YELLOW — SIGNIFICANT CHANGE UP
DIFF PNL FLD: ABNORMAL
EPI CELLS # UR: PRESENT
GLUCOSE UR QL: NEGATIVE MG/DL — SIGNIFICANT CHANGE UP
KETONES UR-MCNC: NEGATIVE MG/DL — SIGNIFICANT CHANGE UP
LEUKOCYTE ESTERASE UR-ACNC: NEGATIVE — SIGNIFICANT CHANGE UP
NITRITE UR-MCNC: NEGATIVE — SIGNIFICANT CHANGE UP
PH UR: 7 — SIGNIFICANT CHANGE UP (ref 5–8)
PROT UR-MCNC: 30 MG/DL
RBC CASTS # UR COMP ASSIST: 30 /HPF — HIGH (ref 0–4)
SP GR SPEC: 1.01 — SIGNIFICANT CHANGE UP (ref 1–1.03)
UROBILINOGEN FLD QL: 0.2 MG/DL — SIGNIFICANT CHANGE UP (ref 0.2–1)
WBC UR QL: 2 /HPF — SIGNIFICANT CHANGE UP (ref 0–5)

## 2024-04-19 PROCEDURE — 99284 EMERGENCY DEPT VISIT MOD MDM: CPT

## 2024-04-19 PROCEDURE — 76376 3D RENDER W/INTRP POSTPROCES: CPT

## 2024-04-19 PROCEDURE — 93320 DOPPLER ECHO COMPLETE: CPT

## 2024-04-19 PROCEDURE — 93312 ECHO TRANSESOPHAGEAL: CPT | Mod: 26

## 2024-04-19 PROCEDURE — 93325 DOPPLER ECHO COLOR FLOW MAPG: CPT | Mod: 26

## 2024-04-19 PROCEDURE — 93325 DOPPLER ECHO COLOR FLOW MAPG: CPT

## 2024-04-19 PROCEDURE — 76376 3D RENDER W/INTRP POSTPROCES: CPT | Mod: 26

## 2024-04-19 PROCEDURE — 93320 DOPPLER ECHO COMPLETE: CPT | Mod: 26

## 2024-04-19 PROCEDURE — 93312 ECHO TRANSESOPHAGEAL: CPT

## 2024-04-19 NOTE — ED ADULT NURSE NOTE - NSFALLRISKINTERV_ED_ALL_ED
Assistance OOB with selected safe patient handling equipment if applicable/Communicate fall risk and risk factors to all staff, patient, and family/Provide visual cue: yellow wristband, yellow gown, etc/Reinforce activity limits and safety measures with patient and family/Toileting schedule using arm’s reach rule for commode and bathroom/Call bell, personal items and telephone in reach/Instruct patient to call for assistance before getting out of bed/chair/stretcher/Non-slip footwear applied when patient is off stretcher/[place] to call system/Physically safe environment - no spills, clutter or unnecessary equipment/Purposeful Proactive Rounding/Room/bathroom lighting operational, light cord in reach

## 2024-04-19 NOTE — ED ADULT TRIAGE NOTE - CHIEF COMPLAINT QUOTE
hx of HTN, BPH, HLD Mitral insuffiencey, afib, s/p KEVIN procedure today. Pt c/o Urinary retention  x 4hrs reports dribbling denies hematuria. hx of HTN, BPH, HLD Mitral insuffiencey, afib, s/p KEVIN procedure today. Pt c/o Urinary retention  x 4hrs reports dribbling denies hematuria. pt missed evening bp meds.. denies chest pain.

## 2024-04-19 NOTE — ED ADULT NURSE NOTE - CHIEF COMPLAINT QUOTE
hx of HTN, BPH, HLD Mitral insuffiencey, afib, s/p KEVIN procedure today. Pt c/o Urinary retention  x 4hrs reports dribbling denies hematuria. pt missed evening bp meds.. denies chest pain.

## 2024-04-19 NOTE — ED ADULT NURSE NOTE - OBJECTIVE STATEMENT
Pt alert and oriented x4, hx of HTN, BPH, HLD Mitral insuffiencey, afib, s/p KEVIN procedure today. Pt c/o Urinary retention  x 4hrs reports dribbling denies hematuria. pt missed evening bp meds. denies chest pain.

## 2024-04-20 RX ORDER — TAMSULOSIN HYDROCHLORIDE 0.4 MG/1
1 CAPSULE ORAL
Qty: 30 | Refills: 0
Start: 2024-04-20 | End: 2024-05-19

## 2024-04-20 NOTE — ED PROVIDER NOTE - CLINICAL SUMMARY MEDICAL DECISION MAKING FREE TEXT BOX
elderly male w/ BPH, mitral valve repair presenting to the ED for urinary retention after KEVIN done earlier today.    post sedation urinary retention  ~800cc from kinney  no evidence of urine infection  dispo home w/ leg bag, flomax and  follow-up

## 2024-04-20 NOTE — ED PROVIDER NOTE - NSICDXPASTSURGICALHX_GEN_ALL_CORE_FT
PAST SURGICAL HISTORY:  H/O mitral valve repair     H/O tricuspid valve repair     S/P ablation of atrial fibrillation     S/P Maze operation for atrial fibrillation

## 2024-04-20 NOTE — ED PROVIDER NOTE - PATIENT PORTAL LINK FT
You can access the FollowMyHealth Patient Portal offered by [facility] by registering at the following website: [email or web address]. By joining [facility]’s FollowMyHealth portal, you will also be able to view your health information using other applications (apps) compatible with our system.

## 2024-04-20 NOTE — ED PROVIDER NOTE - CARE PROVIDER_API CALL
Usha Villalobos  Urology  [address] [id number]  Phone: [phone]  Fax: [phone]  Follow Up Time:

## 2024-04-20 NOTE — ED PROVIDER NOTE - OBJECTIVE STATEMENT
63 M presenting to the ED for urinary retention after KEVIN earlier this evening. hx BPH no longer taking flomax.

## 2024-04-20 NOTE — ED PROVIDER NOTE - NSFOLLOWUPINSTRUCTIONS_ED_ALL_ED_FT
Urinary Retention    Urinary retention is the inability to completely empty your bladder. This is a common problem in older men, especially with enlarged prostates. If you are sent home with a kinney catheter and a drainage system make sure to keep the drainage bag emptied and lower than your catheter. Keep the kinney catheter in until you follow up with a urologist.    SEEK IMMEDIATE MEDICAL CARE IF YOU DEVELOP THE FOLLOWING SYMPTOMS: the catheter stops draining urine, the catheter falls out, abdominal pain, nausea/vomiting, or chills/fever.

## 2024-04-20 NOTE — ED PROVIDER NOTE - PHYSICAL EXAMINATION
General: Well appearing elderly male in no acute distress  HEENT: Normocephalic, atraumatic. Moist mucous membranes. Oropharynx clear. No lymphadenopathy.  Eyes: No scleral icterus. EOMI. HOMERO.  Neck:. Soft and supple. Full ROM without pain. No midline tenderness  Cardiac: Regular rate and regular rhythm. Peripheral pulses 2+ and symmetric. No LE edema.  Resp: Lungs CTAB. Speaking in full sentences. No wheezes, rales or rhonchi.  Abd: Soft, non-tender, non-distended. No guarding or rebound. No scars, masses, or lesions.  : no penile or testicular swelling, tenderness. kinney placed  Back: Spine midline and non-tender. No CVA tenderness.    Skin: No rashes, abrasions, or lacerations.  Neuro: AO x 3. Moves all extremities symmetrically. Motor strength and sensation grossly intact.

## 2024-04-20 NOTE — ED PROVIDER NOTE - NS ED ROS FT
General: Denies fever, chills  HEENT: Denies sensory changes, sore throat  Neck: Denies neck pain, neck stiffness  Resp: Denies coughing, SOB  Cardiovascular: Denies CP, palpitations, LE edema  GI: Denies nausea, vomiting, abdominal pain, diarrhea, constipation, blood in stool  : + urinery retention, dribbling  MSK: Denies back pain  Neuro: Denies HA, dizziness, numbness, weakness  Skin: Denies rashes.

## 2024-04-21 LAB
CULTURE RESULTS: NO GROWTH — SIGNIFICANT CHANGE UP
SPECIMEN SOURCE: SIGNIFICANT CHANGE UP

## 2024-04-22 NOTE — ED ADULT TRIAGE NOTE - NS ED TRIAGE AVPU SCALE
Alert-The patient is alert, awake and responds to voice. The patient is oriented to time, place, and person. The triage nurse is able to obtain subjective information. 10

## 2024-05-10 LAB
ANION GAP SERPL CALC-SCNC: 11 MMOL/L
BUN SERPL-MCNC: 17 MG/DL
CALCIUM SERPL-MCNC: 9.6 MG/DL
CHLORIDE SERPL-SCNC: 104 MMOL/L
CO2 SERPL-SCNC: 28 MMOL/L
CREAT SERPL-MCNC: 1.3 MG/DL
EGFR: 62 ML/MIN/1.73M2
GLUCOSE SERPL-MCNC: 82 MG/DL
POTASSIUM SERPL-SCNC: 5.2 MMOL/L
SODIUM SERPL-SCNC: 142 MMOL/L

## 2024-05-21 ENCOUNTER — APPOINTMENT (OUTPATIENT)
Dept: CARDIOLOGY | Facility: CLINIC | Age: 64
End: 2024-05-21

## 2024-05-21 ENCOUNTER — APPOINTMENT (OUTPATIENT)
Dept: CARDIOTHORACIC SURGERY | Facility: CLINIC | Age: 64
End: 2024-05-21
Payer: COMMERCIAL

## 2024-05-21 ENCOUNTER — OUTPATIENT (OUTPATIENT)
Dept: OUTPATIENT SERVICES | Facility: HOSPITAL | Age: 64
LOS: 1 days | End: 2024-05-21
Payer: COMMERCIAL

## 2024-05-21 VITALS
BODY MASS INDEX: 26.53 KG/M2 | HEART RATE: 64 BPM | WEIGHT: 169 LBS | TEMPERATURE: 208.4 F | RESPIRATION RATE: 15 BRPM | DIASTOLIC BLOOD PRESSURE: 81 MMHG | HEIGHT: 67 IN | OXYGEN SATURATION: 98 % | SYSTOLIC BLOOD PRESSURE: 128 MMHG

## 2024-05-21 DIAGNOSIS — Z98.890 OTHER SPECIFIED POSTPROCEDURAL STATES: Chronic | ICD-10-CM

## 2024-05-21 DIAGNOSIS — I34.0 NONRHEUMATIC MITRAL (VALVE) INSUFFICIENCY: ICD-10-CM

## 2024-05-21 DIAGNOSIS — Z00.00 ENCOUNTER FOR GENERAL ADULT MEDICAL EXAMINATION WITHOUT ABNORMAL FINDINGS: ICD-10-CM

## 2024-05-21 DIAGNOSIS — Z98.890 OTHER SPECIFIED POSTPROCEDURAL STATES: ICD-10-CM

## 2024-05-21 PROCEDURE — 99214 OFFICE O/P EST MOD 30 MIN: CPT

## 2024-05-21 PROCEDURE — 75574 CT ANGIO HRT W/3D IMAGE: CPT

## 2024-05-21 PROCEDURE — 74174 CTA ABD&PLVS W/CONTRAST: CPT | Mod: 26

## 2024-05-21 PROCEDURE — 75574 CT ANGIO HRT W/3D IMAGE: CPT | Mod: 26

## 2024-05-21 PROCEDURE — 74174 CTA ABD&PLVS W/CONTRAST: CPT

## 2024-05-21 RX ORDER — TAMSULOSIN HYDROCHLORIDE 0.4 MG/1
0.4 CAPSULE ORAL
Qty: 30 | Refills: 0 | Status: ACTIVE | COMMUNITY
Start: 2024-05-21

## 2024-05-28 PROBLEM — I34.0 SEVERE MITRAL REGURGITATION: Status: ACTIVE | Noted: 2024-03-25

## 2024-05-28 NOTE — END OF VISIT
[FreeTextEntry3] : Written by Chandu Casanova NP, acting as a scribe for Dr. Westbrook The documentation recorded by the scribe accurately reflects the service I personally performed and the decisions made by me. Signature Brian Westbrook MD.

## 2024-05-28 NOTE — PHYSICAL EXAM
[] : no respiratory distress [Respiration, Rhythm And Depth] : normal respiratory rhythm and effort [Exaggerated Use Of Accessory Muscles For Inspiration] : no accessory muscle use [Auscultation Breath Sounds / Voice Sounds] : lungs were clear to auscultation bilaterally [Apical Impulse] : the apical impulse was normal [Heart Rate And Rhythm] : heart rate was normal and rhythm regular [Heart Sounds] : normal S1 and S2 [FreeTextEntry1] : 2/6 systolic murmur at apex/axilla [Abdomen Soft] : soft [Abdomen Tenderness] : non-tender [Involuntary Movements] : no involuntary movements were seen [No Focal Deficits] : no focal deficits [Oriented To Time, Place, And Person] : oriented to person, place, and time [Impaired Insight] : insight and judgment were intact [Affect] : the affect was normal [Mood] : the mood was normal

## 2024-05-28 NOTE — HISTORY OF PRESENT ILLNESS
[FreeTextEntry1] : 63 year old male followed and referred by Dr. Valentino for evaluation of MR, initially seen in our office last month who presents today for follow up. PMHx includes HTN, HLD, hx of severe mitral regurgitation, s/p Minimally invasive MV repair, TV repair, cryoablation maze and UTE ligation on 2/19/2016 with Dr. Jozef Holguin. He has been followed by Dr. Floyd since.  He presented to ED in January of this year for CP and palpitations where he was noted to be back in afib and RAHAT (Cr 1.65). Trops were negative and downtrending, he converted back to SR and AC was held given CHADSVAC of 1 and prior UTE ligation. He followed up with Alhaji Sanchez with TTE 3/22/24 that revealed severe MR. He was rec to undergo KEVIN which he had 4/19 that showed severe MR.   KEVIN: 4/19/2024:  Left ventricular cavity is mildly dilated. Left ventricular systolic function is normal. There are no regional wall motion abnormalities seen. An annuloplasty ring is noted in the mitral position with severe intravalvular regurgitation. Mitral regurgitation emanates from the lateral aspect of the leaflets. An annuloplasty ring is noted in the tricuspid position. Moderate valvular intravalvular regurgitation of the tricuspid valve prosthesis. Severe pulmonary hypertension.  Presents today sp CT and reports doing and feeling well.  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  Denies any SOB sp his last visit.  Denies swelling, palpitations, CP, weight gain, cough, fever or chills,

## 2024-05-28 NOTE — ASSESSMENT
[FreeTextEntry1] : I reviewed the cardiac imaging, medical records and reports with patient and discussed the case.  I discussed the risks, benefits and alternatives to surgery. Risks included but not limited to bleeding, stroke, Myocardial Infarction, kidney problems, blood transfusion, permanent pacemaker implantation, infections and death. I also discussed the various approaches in detail. I feel that the patient will benefit and is a candidate for redo mitral valve replacement, Tricuspid valve repair/replacement via sternotomy.  All questions and concerns were addressed and patient agrees to proceed with surgery.     Plan:  - Redo MVR/TVR - Continue ASA up to surgery. - Follow up with PCP/Urology for enlarged prostrate and PSA

## 2024-06-05 ENCOUNTER — OUTPATIENT (OUTPATIENT)
Dept: OUTPATIENT SERVICES | Facility: HOSPITAL | Age: 64
LOS: 1 days | End: 2024-06-05
Payer: COMMERCIAL

## 2024-06-05 VITALS
RESPIRATION RATE: 18 BRPM | WEIGHT: 169.09 LBS | DIASTOLIC BLOOD PRESSURE: 71 MMHG | TEMPERATURE: 98 F | HEART RATE: 57 BPM | SYSTOLIC BLOOD PRESSURE: 130 MMHG | HEIGHT: 67 IN | OXYGEN SATURATION: 98 %

## 2024-06-05 DIAGNOSIS — Z29.9 ENCOUNTER FOR PROPHYLACTIC MEASURES, UNSPECIFIED: ICD-10-CM

## 2024-06-05 DIAGNOSIS — Z98.890 OTHER SPECIFIED POSTPROCEDURAL STATES: Chronic | ICD-10-CM

## 2024-06-05 DIAGNOSIS — Z01.818 ENCOUNTER FOR OTHER PREPROCEDURAL EXAMINATION: ICD-10-CM

## 2024-06-05 DIAGNOSIS — I34.81 NONRHEUMATIC MITRAL (VALVE) ANNULUS CALCIFICATION: ICD-10-CM

## 2024-06-05 LAB
ANION GAP SERPL CALC-SCNC: 10 MMOL/L — SIGNIFICANT CHANGE UP (ref 5–17)
BLD GP AB SCN SERPL QL: NEGATIVE — SIGNIFICANT CHANGE UP
BUN SERPL-MCNC: 16 MG/DL — SIGNIFICANT CHANGE UP (ref 7–23)
CALCIUM SERPL-MCNC: 9.4 MG/DL — SIGNIFICANT CHANGE UP (ref 8.4–10.5)
CHLORIDE SERPL-SCNC: 107 MMOL/L — SIGNIFICANT CHANGE UP (ref 96–108)
CO2 SERPL-SCNC: 24 MMOL/L — SIGNIFICANT CHANGE UP (ref 22–31)
CREAT SERPL-MCNC: 1.43 MG/DL — HIGH (ref 0.5–1.3)
EGFR: 55 ML/MIN/1.73M2 — LOW
GLUCOSE SERPL-MCNC: 82 MG/DL — SIGNIFICANT CHANGE UP (ref 70–99)
HCT VFR BLD CALC: 37.9 % — LOW (ref 39–50)
HGB BLD-MCNC: 12.3 G/DL — LOW (ref 13–17)
MCHC RBC-ENTMCNC: 27.3 PG — SIGNIFICANT CHANGE UP (ref 27–34)
MCHC RBC-ENTMCNC: 32.5 GM/DL — SIGNIFICANT CHANGE UP (ref 32–36)
MCV RBC AUTO: 84 FL — SIGNIFICANT CHANGE UP (ref 80–100)
NRBC # BLD: 0 /100 WBCS — SIGNIFICANT CHANGE UP (ref 0–0)
PLATELET # BLD AUTO: 114 K/UL — LOW (ref 150–400)
POTASSIUM SERPL-MCNC: 4 MMOL/L — SIGNIFICANT CHANGE UP (ref 3.5–5.3)
POTASSIUM SERPL-SCNC: 4 MMOL/L — SIGNIFICANT CHANGE UP (ref 3.5–5.3)
RBC # BLD: 4.49 M/UL — SIGNIFICANT CHANGE UP (ref 4.2–5.8)
RBC # BLD: 4.51 M/UL — SIGNIFICANT CHANGE UP (ref 4.2–5.8)
RBC # FLD: 14.6 % — HIGH (ref 10.3–14.5)
RETICS #: 40 K/UL — SIGNIFICANT CHANGE UP (ref 25–125)
RETICS/RBC NFR: 0.9 % — SIGNIFICANT CHANGE UP (ref 0.5–2.5)
RH IG SCN BLD-IMP: POSITIVE — SIGNIFICANT CHANGE UP
SODIUM SERPL-SCNC: 141 MMOL/L — SIGNIFICANT CHANGE UP (ref 135–145)
WBC # BLD: 3.73 K/UL — LOW (ref 3.8–10.5)
WBC # FLD AUTO: 3.73 K/UL — LOW (ref 3.8–10.5)

## 2024-06-05 PROCEDURE — 82728 ASSAY OF FERRITIN: CPT

## 2024-06-05 PROCEDURE — G0463: CPT

## 2024-06-05 PROCEDURE — 83036 HEMOGLOBIN GLYCOSYLATED A1C: CPT

## 2024-06-05 PROCEDURE — 80048 BASIC METABOLIC PNL TOTAL CA: CPT

## 2024-06-05 PROCEDURE — 84466 ASSAY OF TRANSFERRIN: CPT

## 2024-06-05 PROCEDURE — 82607 VITAMIN B-12: CPT

## 2024-06-05 PROCEDURE — 85027 COMPLETE CBC AUTOMATED: CPT

## 2024-06-05 PROCEDURE — 86141 C-REACTIVE PROTEIN HS: CPT

## 2024-06-05 PROCEDURE — 86850 RBC ANTIBODY SCREEN: CPT

## 2024-06-05 PROCEDURE — 83550 IRON BINDING TEST: CPT

## 2024-06-05 PROCEDURE — 71046 X-RAY EXAM CHEST 2 VIEWS: CPT

## 2024-06-05 PROCEDURE — 85045 AUTOMATED RETICULOCYTE COUNT: CPT

## 2024-06-05 PROCEDURE — 87641 MR-STAPH DNA AMP PROBE: CPT

## 2024-06-05 PROCEDURE — 87640 STAPH A DNA AMP PROBE: CPT

## 2024-06-05 PROCEDURE — 82746 ASSAY OF FOLIC ACID SERUM: CPT

## 2024-06-05 PROCEDURE — 86900 BLOOD TYPING SEROLOGIC ABO: CPT

## 2024-06-05 PROCEDURE — 71046 X-RAY EXAM CHEST 2 VIEWS: CPT | Mod: 26

## 2024-06-05 PROCEDURE — 83540 ASSAY OF IRON: CPT

## 2024-06-05 PROCEDURE — 36415 COLL VENOUS BLD VENIPUNCTURE: CPT

## 2024-06-05 PROCEDURE — 86901 BLOOD TYPING SEROLOGIC RH(D): CPT

## 2024-06-05 RX ORDER — TADALAFIL 10 MG/1
1 TABLET, FILM COATED ORAL
Refills: 0 | DISCHARGE

## 2024-06-05 NOTE — H&P PST ADULT - NSANTHOSAYNRD_GEN_A_CORE
No. JUSTO screening performed.  STOP BANG Legend: 0-2 = LOW Risk; 3-4 = INTERMEDIATE Risk; 5-8 = HIGH Risk

## 2024-06-05 NOTE — H&P PST ADULT - ASSESSMENT
ACTIVITY-  active at work,  daily activities,    Energy Expenditure(Mets):    6.25 by dasi   Symptoms-none     Airway:  normal    Mallampati-     3  Dental: Patient denies loose teeth   CAPRINI SCORE [CLOT updated 18]    AGE RELATED RISK FACTORS                                                       MOBILITY RELATED FACTORS  [ ] Age 41-60 years                                            (1 Point)                    [ ] Bed rest                                                        (1 Point)  [x ] Age: 61-74 years                                           (2 Points)                  [ ] Plaster cast                                                   (2 Points)  [ ] Age= 75 years                                              (3 Points)                    [ ] Bed bound for more than 72 hours                 (2 Points)    DISEASE RELATED RISK FACTORS                                               GENDER SPECIFIC FACTORS  [ ] Edema in the lower extremities                       (1 Point)              [ ] Pregnancy                                                     (1 Point)  [ ] Varicose veins                                               (1 Point)                     [ ] Post-partum < 6 weeks                                   (1 Point)             [ x] BMI > 25 Kg/m2                                            (1 Point)                     [ ] Hormonal therapy  or oral contraception          (1 Point)                 [ ] Sepsis (in the previous month)                        (1 Point)               [ ] History of pregnancy complications                 (1 point)  [ ] Pneumonia or serious lung disease                                               [ ] Unexplained or recurrent                     (1 Point)           (in the previous month)                               (1 Point)  [ ] Abnormal pulmonary function test                     (1 Point)                 SURGERY RELATED RISK FACTORS  [ ] Acute myocardial infarction                              (1 Point)               [ ]  Section                                             (1 Point)  [ ] Congestive heart failure (in the previous month)  (1 Point)      [ ] Minor surgery                                                  (1 Point)   [ ] Inflammatory bowel disease                             (1 Point)               [ ] Arthroscopic surgery                                        (2 Points)  [ ] Central venous access                                      (2 Points)                [x ] General surgery lasting more than 45 minutes (2 points)  [ ] Present or previous malignancy                     (2 Points)                [ ] Elective arthroplasty                                         (5 points)    [ ] Stroke (in the previous month)                          (5 Points)                                                                                                                                                           HEMATOLOGY RELATED FACTORS                                                 TRAUMA RELATED RISK FACTORS  [ ] Prior episodes of VTE                                     (3 Points)                [ ] Fracture of the hip, pelvis, or leg                       (5 Points)  [ ] Positive family history for VTE                         (3 Points)             [ ] Acute spinal cord injury (in the previous month)  (5 Points)  [ ] Prothrombin 37133 A                                     (3 Points)               [ ] Paralysis  (less than 1 month)                             (5 Points)  [ ] Factor V Leiden                                             (3 Points)                  [ ] Multiple Trauma within 1 month                        (5 Points)  [ ] Lupus anticoagulants                                     (3 Points)                                                           [ ] Anticardiolipin antibodies                               (3 Points)                                                       [ ] High homocysteine in the blood                      (3 Points)                                             [ ] Other congenital or acquired thrombophilia      (3 Points)                                                [ ] Heparin induced thrombocytopenia                  (3 Points)                                     Total Score [   5       ]

## 2024-06-05 NOTE — H&P PST ADULT - LAST ECHOCARDIOGRAM
4/24 Left ventricular cavity is mildly dilated. Left ventricular systolic function is normal. There are no regional wall motion abnormalities seen. An annuloplasty ring is noted in the mitral position with severe intravalvular regurgitation. Mitral regurgitation emanates from the lateral aspect of the leaflets. An annuloplasty ring is noted in the tricuspid position. Moderate valvular intervalvular regurgitation of the tricuspid valve prosthesis. Severe pulmonary hypertension.

## 2024-06-05 NOTE — H&P PST ADULT - HISTORY OF PRESENT ILLNESS
Rodney Dasilva is a 64 year old male with PMHx of HTN, HLD, BPH, hx of PVCs, hx of severe mitral regurgitation (s/p mitral valve repair in 2016), hx of tricuspid regurgitation (s/p TV repair in 2016), hx of atrial fibrillation (s/p Maze procedure in 2016 and s/p ablation in 2017). ?e presented to ED in January of this year for CP and palpitations where he was noted to be back in afib and RAHAT  he converted back to SR . Pt  followed up with Alhaji Sanchez with TTE 3/22/24 that revealed severe MR. He was rec to undergo KEVIN which he had 4/19 that showed severe MR. Presents to PST for scheduled Redo Mitral Valve Replacement ,Tricuspid Valve Replacement on 6/12/24.

## 2024-06-05 NOTE — H&P PST ADULT - PROBLEM SELECTOR PLAN 1
Redo Mitral Valve Replacement ,Tricuspid Valve Replacement on 6/12/24.  Pre- Op Instructions discussed   Labs sent   chest xray done   pt will continue ASA as per surgeon

## 2024-06-06 LAB
A1C WITH ESTIMATED AVERAGE GLUCOSE RESULT: 5.9 % — HIGH (ref 4–5.6)
CRP SERPL HS-MCNC: 0.79 MG/L — SIGNIFICANT CHANGE UP (ref 0–3)
ESTIMATED AVERAGE GLUCOSE: 123 MG/DL — HIGH (ref 68–114)
FERRITIN SERPL-MCNC: 141 NG/ML — SIGNIFICANT CHANGE UP (ref 30–400)
FOLATE SERPL-MCNC: 10.4 NG/ML — SIGNIFICANT CHANGE UP
IRON SATN MFR SERPL: 20 % — SIGNIFICANT CHANGE UP (ref 16–55)
IRON SATN MFR SERPL: 71 UG/DL — SIGNIFICANT CHANGE UP (ref 45–165)
MRSA PCR RESULT.: SIGNIFICANT CHANGE UP
S AUREUS DNA NOSE QL NAA+PROBE: SIGNIFICANT CHANGE UP
TIBC SERPL-MCNC: 359 UG/DL — SIGNIFICANT CHANGE UP (ref 220–430)
TRANSFERRIN SERPL-MCNC: 288 MG/DL — SIGNIFICANT CHANGE UP (ref 200–360)
UIBC SERPL-MCNC: 288 UG/DL — SIGNIFICANT CHANGE UP (ref 110–370)
VIT B12 SERPL-MCNC: 918 PG/ML — SIGNIFICANT CHANGE UP (ref 232–1245)

## 2024-06-08 ENCOUNTER — EMERGENCY (EMERGENCY)
Facility: HOSPITAL | Age: 64
LOS: 0 days | Discharge: ANOTHER HOSPITAL | End: 2024-06-08
Attending: STUDENT IN AN ORGANIZED HEALTH CARE EDUCATION/TRAINING PROGRAM
Payer: COMMERCIAL

## 2024-06-08 ENCOUNTER — INPATIENT (INPATIENT)
Facility: HOSPITAL | Age: 64
LOS: 10 days | Discharge: HOME | DRG: 310 | End: 2024-06-19
Attending: STUDENT IN AN ORGANIZED HEALTH CARE EDUCATION/TRAINING PROGRAM | Admitting: THORACIC SURGERY (CARDIOTHORACIC VASCULAR SURGERY)
Payer: COMMERCIAL

## 2024-06-08 VITALS
RESPIRATION RATE: 17 BRPM | SYSTOLIC BLOOD PRESSURE: 132 MMHG | HEART RATE: 142 BPM | OXYGEN SATURATION: 98 % | DIASTOLIC BLOOD PRESSURE: 97 MMHG | HEIGHT: 67 IN | WEIGHT: 169.98 LBS | TEMPERATURE: 98 F

## 2024-06-08 VITALS
SYSTOLIC BLOOD PRESSURE: 105 MMHG | OXYGEN SATURATION: 98 % | RESPIRATION RATE: 13 BRPM | DIASTOLIC BLOOD PRESSURE: 83 MMHG | HEART RATE: 59 BPM

## 2024-06-08 VITALS
RESPIRATION RATE: 18 BRPM | DIASTOLIC BLOOD PRESSURE: 92 MMHG | TEMPERATURE: 97 F | HEART RATE: 87 BPM | WEIGHT: 168.65 LBS | OXYGEN SATURATION: 96 % | SYSTOLIC BLOOD PRESSURE: 142 MMHG

## 2024-06-08 DIAGNOSIS — I48.92 UNSPECIFIED ATRIAL FLUTTER: ICD-10-CM

## 2024-06-08 DIAGNOSIS — Z98.890 OTHER SPECIFIED POSTPROCEDURAL STATES: Chronic | ICD-10-CM

## 2024-06-08 DIAGNOSIS — R00.2 PALPITATIONS: ICD-10-CM

## 2024-06-08 DIAGNOSIS — Z87.438 PERSONAL HISTORY OF OTHER DISEASES OF MALE GENITAL ORGANS: ICD-10-CM

## 2024-06-08 DIAGNOSIS — E78.5 HYPERLIPIDEMIA, UNSPECIFIED: ICD-10-CM

## 2024-06-08 DIAGNOSIS — I10 ESSENTIAL (PRIMARY) HYPERTENSION: ICD-10-CM

## 2024-06-08 DIAGNOSIS — Z86.79 PERSONAL HISTORY OF OTHER DISEASES OF THE CIRCULATORY SYSTEM: ICD-10-CM

## 2024-06-08 LAB
ALBUMIN SERPL ELPH-MCNC: 3.7 G/DL — SIGNIFICANT CHANGE UP (ref 3.3–5)
ALBUMIN SERPL ELPH-MCNC: 3.8 G/DL — SIGNIFICANT CHANGE UP (ref 3.3–5)
ALP SERPL-CCNC: 58 U/L — SIGNIFICANT CHANGE UP (ref 40–120)
ALP SERPL-CCNC: 61 U/L — SIGNIFICANT CHANGE UP (ref 40–120)
ALT FLD-CCNC: 17 U/L — SIGNIFICANT CHANGE UP (ref 10–45)
ALT FLD-CCNC: 24 U/L — SIGNIFICANT CHANGE UP (ref 12–78)
ANION GAP SERPL CALC-SCNC: 10 MMOL/L — SIGNIFICANT CHANGE UP (ref 5–17)
ANION GAP SERPL CALC-SCNC: 11 MMOL/L — SIGNIFICANT CHANGE UP (ref 5–17)
APPEARANCE UR: ABNORMAL
APTT BLD: 36.7 SEC — HIGH (ref 24.5–35.6)
AST SERPL-CCNC: 28 U/L — SIGNIFICANT CHANGE UP (ref 10–40)
AST SERPL-CCNC: 34 U/L — SIGNIFICANT CHANGE UP (ref 15–37)
BACTERIA # UR AUTO: NEGATIVE /HPF — SIGNIFICANT CHANGE UP
BASOPHILS # BLD AUTO: 0.03 K/UL — SIGNIFICANT CHANGE UP (ref 0–0.2)
BASOPHILS # BLD AUTO: 0.04 K/UL — SIGNIFICANT CHANGE UP (ref 0–0.2)
BASOPHILS NFR BLD AUTO: 0.8 % — SIGNIFICANT CHANGE UP (ref 0–2)
BASOPHILS NFR BLD AUTO: 0.9 % — SIGNIFICANT CHANGE UP (ref 0–2)
BILIRUB SERPL-MCNC: 1.6 MG/DL — HIGH (ref 0.2–1.2)
BILIRUB SERPL-MCNC: 1.6 MG/DL — HIGH (ref 0.2–1.2)
BILIRUB UR-MCNC: NEGATIVE — SIGNIFICANT CHANGE UP
BUN SERPL-MCNC: 13 MG/DL — SIGNIFICANT CHANGE UP (ref 7–23)
BUN SERPL-MCNC: 14 MG/DL — SIGNIFICANT CHANGE UP (ref 7–23)
CALCIUM SERPL-MCNC: 9.2 MG/DL — SIGNIFICANT CHANGE UP (ref 8.5–10.1)
CALCIUM SERPL-MCNC: 9.4 MG/DL — SIGNIFICANT CHANGE UP (ref 8.4–10.5)
CHLORIDE SERPL-SCNC: 106 MMOL/L — SIGNIFICANT CHANGE UP (ref 96–108)
CHLORIDE SERPL-SCNC: 108 MMOL/L — SIGNIFICANT CHANGE UP (ref 96–108)
CO2 SERPL-SCNC: 23 MMOL/L — SIGNIFICANT CHANGE UP (ref 22–31)
CO2 SERPL-SCNC: 23 MMOL/L — SIGNIFICANT CHANGE UP (ref 22–31)
COLOR SPEC: YELLOW — SIGNIFICANT CHANGE UP
CREAT SERPL-MCNC: 1.12 MG/DL — SIGNIFICANT CHANGE UP (ref 0.5–1.3)
CREAT SERPL-MCNC: 1.17 MG/DL — SIGNIFICANT CHANGE UP (ref 0.5–1.3)
DIFF PNL FLD: NEGATIVE — SIGNIFICANT CHANGE UP
EGFR: 70 ML/MIN/1.73M2 — SIGNIFICANT CHANGE UP
EGFR: 74 ML/MIN/1.73M2 — SIGNIFICANT CHANGE UP
EOSINOPHIL # BLD AUTO: 0.07 K/UL — SIGNIFICANT CHANGE UP (ref 0–0.5)
EOSINOPHIL # BLD AUTO: 0.18 K/UL — SIGNIFICANT CHANGE UP (ref 0–0.5)
EOSINOPHIL NFR BLD AUTO: 1.6 % — SIGNIFICANT CHANGE UP (ref 0–6)
EOSINOPHIL NFR BLD AUTO: 4.5 % — SIGNIFICANT CHANGE UP (ref 0–6)
GLUCOSE SERPL-MCNC: 74 MG/DL — SIGNIFICANT CHANGE UP (ref 70–99)
GLUCOSE SERPL-MCNC: 82 MG/DL — SIGNIFICANT CHANGE UP (ref 70–99)
GLUCOSE UR QL: NEGATIVE MG/DL — SIGNIFICANT CHANGE UP
HCT VFR BLD CALC: 40.2 % — SIGNIFICANT CHANGE UP (ref 39–50)
HCT VFR BLD CALC: 40.7 % — SIGNIFICANT CHANGE UP (ref 39–50)
HGB BLD-MCNC: 12.7 G/DL — LOW (ref 13–17)
HGB BLD-MCNC: 13 G/DL — SIGNIFICANT CHANGE UP (ref 13–17)
IMM GRANULOCYTES NFR BLD AUTO: 0.2 % — SIGNIFICANT CHANGE UP (ref 0–0.9)
IMM GRANULOCYTES NFR BLD AUTO: 0.3 % — SIGNIFICANT CHANGE UP (ref 0–0.9)
INR BLD: 1 RATIO — SIGNIFICANT CHANGE UP (ref 0.85–1.18)
KETONES UR-MCNC: NEGATIVE MG/DL — SIGNIFICANT CHANGE UP
LEUKOCYTE ESTERASE UR-ACNC: NEGATIVE — SIGNIFICANT CHANGE UP
LYMPHOCYTES # BLD AUTO: 1.96 K/UL — SIGNIFICANT CHANGE UP (ref 1–3.3)
LYMPHOCYTES # BLD AUTO: 1.97 K/UL — SIGNIFICANT CHANGE UP (ref 1–3.3)
LYMPHOCYTES # BLD AUTO: 44.3 % — HIGH (ref 13–44)
LYMPHOCYTES # BLD AUTO: 49.7 % — HIGH (ref 13–44)
MAGNESIUM SERPL-MCNC: 2.1 MG/DL — SIGNIFICANT CHANGE UP (ref 1.6–2.6)
MCHC RBC-ENTMCNC: 26.9 PG — LOW (ref 27–34)
MCHC RBC-ENTMCNC: 27.1 PG — SIGNIFICANT CHANGE UP (ref 27–34)
MCHC RBC-ENTMCNC: 31.6 GM/DL — LOW (ref 32–36)
MCHC RBC-ENTMCNC: 31.9 G/DL — LOW (ref 32–36)
MCV RBC AUTO: 85 FL — SIGNIFICANT CHANGE UP (ref 80–100)
MCV RBC AUTO: 85.2 FL — SIGNIFICANT CHANGE UP (ref 80–100)
MONOCYTES # BLD AUTO: 0.3 K/UL — SIGNIFICANT CHANGE UP (ref 0–0.9)
MONOCYTES # BLD AUTO: 0.36 K/UL — SIGNIFICANT CHANGE UP (ref 0–0.9)
MONOCYTES NFR BLD AUTO: 7.6 % — SIGNIFICANT CHANGE UP (ref 2–14)
MONOCYTES NFR BLD AUTO: 8.1 % — SIGNIFICANT CHANGE UP (ref 2–14)
NEUTROPHILS # BLD AUTO: 1.47 K/UL — LOW (ref 1.8–7.4)
NEUTROPHILS # BLD AUTO: 1.98 K/UL — SIGNIFICANT CHANGE UP (ref 1.8–7.4)
NEUTROPHILS NFR BLD AUTO: 37.1 % — LOW (ref 43–77)
NEUTROPHILS NFR BLD AUTO: 44.9 % — SIGNIFICANT CHANGE UP (ref 43–77)
NITRITE UR-MCNC: NEGATIVE — SIGNIFICANT CHANGE UP
NRBC # BLD: 0 /100 WBCS — SIGNIFICANT CHANGE UP (ref 0–0)
NRBC # BLD: 0 /100 WBCS — SIGNIFICANT CHANGE UP (ref 0–0)
NT-PROBNP SERPL-SCNC: 753 PG/ML — HIGH (ref 0–300)
PH UR: 7.5 — SIGNIFICANT CHANGE UP (ref 5–8)
PLATELET # BLD AUTO: 106 K/UL — LOW (ref 150–400)
PLATELET # BLD AUTO: 96 K/UL — LOW (ref 150–400)
POTASSIUM SERPL-MCNC: 3.6 MMOL/L — SIGNIFICANT CHANGE UP (ref 3.5–5.3)
POTASSIUM SERPL-MCNC: 4.2 MMOL/L — SIGNIFICANT CHANGE UP (ref 3.5–5.3)
POTASSIUM SERPL-SCNC: 3.6 MMOL/L — SIGNIFICANT CHANGE UP (ref 3.5–5.3)
POTASSIUM SERPL-SCNC: 4.2 MMOL/L — SIGNIFICANT CHANGE UP (ref 3.5–5.3)
PROT SERPL-MCNC: 7.1 G/DL — SIGNIFICANT CHANGE UP (ref 6–8.3)
PROT SERPL-MCNC: 7.7 GM/DL — SIGNIFICANT CHANGE UP (ref 6–8.3)
PROT UR-MCNC: 100 MG/DL
PROTHROM AB SERPL-ACNC: 11.9 SEC — SIGNIFICANT CHANGE UP (ref 9.5–13)
RBC # BLD: 4.72 M/UL — SIGNIFICANT CHANGE UP (ref 4.2–5.8)
RBC # BLD: 4.79 M/UL — SIGNIFICANT CHANGE UP (ref 4.2–5.8)
RBC # FLD: 14.6 % — HIGH (ref 10.3–14.5)
RBC # FLD: 14.8 % — HIGH (ref 10.3–14.5)
RBC CASTS # UR COMP ASSIST: 0 /HPF — SIGNIFICANT CHANGE UP (ref 0–4)
SODIUM SERPL-SCNC: 140 MMOL/L — SIGNIFICANT CHANGE UP (ref 135–145)
SODIUM SERPL-SCNC: 141 MMOL/L — SIGNIFICANT CHANGE UP (ref 135–145)
SP GR SPEC: 1.01 — SIGNIFICANT CHANGE UP (ref 1–1.03)
TSH SERPL-MCNC: 2.94 UU/ML — SIGNIFICANT CHANGE UP (ref 0.36–3.74)
UROBILINOGEN FLD QL: 0.2 MG/DL — SIGNIFICANT CHANGE UP (ref 0.2–1)
WBC # BLD: 3.96 K/UL — SIGNIFICANT CHANGE UP (ref 3.8–10.5)
WBC # BLD: 4.42 K/UL — SIGNIFICANT CHANGE UP (ref 3.8–10.5)
WBC # FLD AUTO: 3.96 K/UL — SIGNIFICANT CHANGE UP (ref 3.8–10.5)
WBC # FLD AUTO: 4.42 K/UL — SIGNIFICANT CHANGE UP (ref 3.8–10.5)
WBC UR QL: 0 /HPF — SIGNIFICANT CHANGE UP (ref 0–5)

## 2024-06-08 PROCEDURE — 99223 1ST HOSP IP/OBS HIGH 75: CPT

## 2024-06-08 PROCEDURE — 93010 ELECTROCARDIOGRAM REPORT: CPT

## 2024-06-08 PROCEDURE — 99285 EMERGENCY DEPT VISIT HI MDM: CPT

## 2024-06-08 PROCEDURE — 71045 X-RAY EXAM CHEST 1 VIEW: CPT | Mod: 26,77

## 2024-06-08 PROCEDURE — 71045 X-RAY EXAM CHEST 1 VIEW: CPT | Mod: 26

## 2024-06-08 RX ORDER — DILTIAZEM HCL 120 MG
60 CAPSULE, EXT RELEASE 24 HR ORAL ONCE
Refills: 0 | Status: COMPLETED | OUTPATIENT
Start: 2024-06-08 | End: 2024-06-08

## 2024-06-08 RX ORDER — SODIUM CHLORIDE 0.9 % (FLUSH) 0.9 %
3 SYRINGE (ML) INJECTION EVERY 8 HOURS
Refills: 0 | Status: DISCONTINUED | OUTPATIENT
Start: 2024-06-08 | End: 2024-06-12

## 2024-06-08 RX ORDER — METOPROLOL TARTRATE 50 MG
50 TABLET ORAL
Refills: 0 | Status: DISCONTINUED | OUTPATIENT
Start: 2024-06-08 | End: 2024-06-10

## 2024-06-08 RX ORDER — DILTIAZEM HCL 120 MG
10 CAPSULE, EXT RELEASE 24 HR ORAL ONCE
Refills: 0 | Status: COMPLETED | OUTPATIENT
Start: 2024-06-08 | End: 2024-06-08

## 2024-06-08 RX ORDER — ATORVASTATIN CALCIUM 20 MG/1
20 TABLET, FILM COATED ORAL AT BEDTIME
Refills: 0 | Status: DISCONTINUED | OUTPATIENT
Start: 2024-06-08 | End: 2024-06-12

## 2024-06-08 RX ORDER — ACETAMINOPHEN 500 MG
975 TABLET ORAL ONCE
Refills: 0 | Status: COMPLETED | OUTPATIENT
Start: 2024-06-08 | End: 2024-06-08

## 2024-06-08 RX ORDER — HEPARIN SODIUM 5000 [USP'U]/ML
3000 INJECTION INTRAVENOUS; SUBCUTANEOUS EVERY 6 HOURS
Refills: 0 | Status: DISCONTINUED | OUTPATIENT
Start: 2024-06-08 | End: 2024-06-08

## 2024-06-08 RX ORDER — HEPARIN SODIUM 50 [USP'U]/ML
1400 INJECTION, SOLUTION INTRAVENOUS
Qty: 25000 | Refills: 0 | Status: DISCONTINUED | OUTPATIENT
Start: 2024-06-08 | End: 2024-06-10

## 2024-06-08 RX ORDER — HEPARIN SODIUM 5000 [USP'U]/ML
INJECTION INTRAVENOUS; SUBCUTANEOUS
Qty: 25000 | Refills: 0 | Status: DISCONTINUED | OUTPATIENT
Start: 2024-06-08 | End: 2024-06-08

## 2024-06-08 RX ORDER — TAMSULOSIN HYDROCHLORIDE 0.4 MG/1
0.4 CAPSULE ORAL AT BEDTIME
Refills: 0 | Status: DISCONTINUED | OUTPATIENT
Start: 2024-06-08 | End: 2024-06-12

## 2024-06-08 RX ORDER — HEPARIN SODIUM 5000 [USP'U]/ML
6500 INJECTION INTRAVENOUS; SUBCUTANEOUS ONCE
Refills: 0 | Status: COMPLETED | OUTPATIENT
Start: 2024-06-08 | End: 2024-06-08

## 2024-06-08 RX ORDER — SODIUM CHLORIDE 9 MG/ML
500 INJECTION INTRAMUSCULAR; INTRAVENOUS; SUBCUTANEOUS ONCE
Refills: 0 | Status: COMPLETED | OUTPATIENT
Start: 2024-06-08 | End: 2024-06-08

## 2024-06-08 RX ORDER — ASPIRIN 325 MG/1
81 TABLET, FILM COATED ORAL DAILY
Refills: 0 | Status: DISCONTINUED | OUTPATIENT
Start: 2024-06-08 | End: 2024-06-09

## 2024-06-08 RX ORDER — HEPARIN SODIUM 5000 [USP'U]/ML
6500 INJECTION INTRAVENOUS; SUBCUTANEOUS EVERY 6 HOURS
Refills: 0 | Status: DISCONTINUED | OUTPATIENT
Start: 2024-06-08 | End: 2024-06-08

## 2024-06-08 RX ADMIN — Medication 3 MILLILITER(S): at 21:50

## 2024-06-08 RX ADMIN — Medication 60 MILLIGRAM(S): at 14:31

## 2024-06-08 RX ADMIN — HEPARIN SODIUM 6500 UNIT(S): 5000 INJECTION INTRAVENOUS; SUBCUTANEOUS at 15:22

## 2024-06-08 RX ADMIN — SODIUM CHLORIDE 500 MILLILITER(S): 9 INJECTION INTRAMUSCULAR; INTRAVENOUS; SUBCUTANEOUS at 12:14

## 2024-06-08 RX ADMIN — Medication 10 MILLIGRAM(S): at 13:50

## 2024-06-08 RX ADMIN — Medication 975 MILLIGRAM(S): at 12:13

## 2024-06-08 RX ADMIN — Medication 10 MILLIGRAM(S): at 15:50

## 2024-06-08 RX ADMIN — SODIUM CHLORIDE 500 MILLILITER(S): 9 INJECTION INTRAMUSCULAR; INTRAVENOUS; SUBCUTANEOUS at 20:36

## 2024-06-08 RX ADMIN — ATORVASTATIN CALCIUM 20 MILLIGRAM(S): 20 TABLET, FILM COATED ORAL at 23:29

## 2024-06-08 RX ADMIN — TAMSULOSIN HYDROCHLORIDE 0.4 MILLIGRAM(S): 0.4 CAPSULE ORAL at 23:30

## 2024-06-08 RX ADMIN — HEPARIN SODIUM 1400 UNIT(S)/HR: 5000 INJECTION INTRAVENOUS; SUBCUTANEOUS at 15:22

## 2024-06-08 RX ADMIN — HEPARIN SODIUM 14 UNIT(S)/HR: 50 INJECTION, SOLUTION INTRAVENOUS at 23:30

## 2024-06-08 RX ADMIN — Medication 975 MILLIGRAM(S): at 19:44

## 2024-06-08 NOTE — ED ADULT NURSE NOTE - CAS EDN DISCHARGE INTERVENTIONS
pt sent with heparin drip infusing at 14 ml/hr/IV intact pt sent with heparin drip infusing at 14 ml/hr, endorsed to EMS and is running on their pump./IV intact

## 2024-06-08 NOTE — ED ADULT NURSE REASSESSMENT NOTE - NS ED NURSE REASSESS COMMENT FT1
report received from Mariajose CORTEZ. pt A&Ox4, alert, VSS in flowsheet; pt to be transferred to [facility], awaiting to give report on patient to receiving RN. pt Heparin in 14 ml/hr report received from Mariajose CORTEZ. pt A&Ox4, alert, VSS in flowsheet; pt to be transferred to [facility], awaiting to give report on patient to receiving RN. pt on Heparin drip for atrial flutter infusing at 14 ml/hr. safety measures in place, will continue to monitor. report received from Mariajose CORTEZ. pt A&Ox4, alert, VSS in flowsheet; pt to be transferred to [facility], awaiting to give report on patient to receiving RN. pt on Heparin drip for atrial flutter infusing at 14 ml/hr, no complications noted at site. safety measures in place, will continue to monitor.

## 2024-06-08 NOTE — ED ADULT TRIAGE NOTE - STATUS:
From: Carolian Price  To: Danita Shaw [facility] pharmacy in [place]. Carolina Price   Intact

## 2024-06-08 NOTE — ED PROVIDER NOTE - NSRISKOFTRANSFER_ED_A_ED
Deterioration of Condition En Route/Transportation Risk (There is always a risk of traffic delays resulting in deterioration of condition.)/Other:

## 2024-06-08 NOTE — ED PROVIDER NOTE - OBJECTIVE STATEMENT
63 y/o M w/ PMH of HTN, HLD, BPH, hx of PVCs, hx of severe mitral regurgitation (s/p mitral valve repair in 2016), hx of tricuspid regurgitation (s/p TV repair in 2016), hx of atrial fibrillation (s/p Maze procedure in 2016 and s/p ablation in 2017), a fib self converted Jan 2024 presenting w/ palpitations. Reports palpitations started this morning, no associated CP or shortness of breath. Reports feels well otherwise. Is planned for mitral and tricuspid valve surgery on 6/12 at [facility] w/ Dr. Westbrook. Denies fevers, chills, headache, dizziness, blurred vision, chest pain, cough, shortness of breath, abdominal pain, n/v/d/c, urinary symptoms, MSK pain, rash.

## 2024-06-08 NOTE — ED ADULT NURSE NOTE - NSFALLUNIVINTERV_ED_ALL_ED
Bed/Stretcher in lowest position, wheels locked, appropriate side rails in place/Call bell, personal items and telephone in reach/Instruct patient to call for assistance before getting out of bed/chair/stretcher/Non-slip footwear applied when patient is off stretcher/[place] to call system/Physically safe environment - no spills, clutter or unnecessary equipment/Purposeful proactive rounding/Room/bathroom lighting operational, light cord in reach

## 2024-06-08 NOTE — ED PROVIDER NOTE - PHYSICAL EXAMINATION
55 year old man with a past medical history of HTN, gastritis, PUD and ETOH abuse with numerous admissions for ETOH withdrawals. He was admitted to the hospital on 6/4/21 for ETOH withdrawal and gastritis. Transferred to the ICU on 6/6 for ETOH withdrawal with delirium and behavioral disturbances. Patient is now stable for discharge. patient was advised on alcohol abstinence, and discussed about out patient detox, but patient is not interested and would like to discuss with his PCP.    
Gen: NAD, AOx3, able to make needs known, non-toxic  Head: NCAT  HEENT: EOMI, oral mucosa moist, normal conjunctiva  Lung: no respiratory distress, CTAB, no wheezes/rhonchi/rales B/L, speaking in full sentences  CV: tachycardic, no murmurs  Abd: non distended, soft, nontender, no guarding, no CVA tenderness  MSK: no visible deformities  Neuro: Appears non focal  Skin: Warm, well perfused  Psych: normal affect

## 2024-06-08 NOTE — ED PROVIDER NOTE - PROGRESS NOTE DETAILS
Attending Katherine: reviewed rhythm strip w/ tele tech. rhythm most consistent w/ a flutter. pt given cardizem, rate improved. Flutter waves now very evident. PO cardizem given. will discuss w/ CT surg via transfer center. Attending Katherine: spoke w/ Dr. Anderson (covering for Dr. Westbrook). Recommended transfer to [facility] on hep gtt (eliquis contraindicated given upcoming procedure). pt updated and agreeable.

## 2024-06-08 NOTE — ED PROVIDER NOTE - CLINICAL SUMMARY MEDICAL DECISION MAKING FREE TEXT BOX
63 y/o M w/ PMH of HTN, HLD, BPH, hx of PVCs, hx of severe mitral regurgitation (s/p mitral valve repair in 2016), hx of tricuspid regurgitation (s/p TV repair in 2016), hx of atrial fibrillation (s/p Maze procedure in 2016 and s/p ablation in 2017), a fib self converted Jan 2024 presenting w/ palpitations. Reports palpitations started this morning, no associated CP or shortness of breath. Reports feels well otherwise. Is planned for mitral and tricuspid valve surgery on 6/12 at [facility] w/ Dr. Westbrook. Denies fevers, chills, headache, dizziness, blurred vision, chest pain, cough, shortness of breath, abdominal pain, n/v/d/c, urinary symptoms, MSK pain, rash. Pt well appearing. Low grade rectal temp, will give IVF and tylenol. Eval for metabolic abnormalities. Check TSH. Do not suspect PE given no respiratory symptoms or chest pain. Plan for labs, imaging, EKG, IVF, meds. Will reassess the need for additional interventions as clinically warranted. Refer to any progress notes for updates on clinical course and as a continuation of this MDM.

## 2024-06-08 NOTE — ED ADULT NURSE NOTE - OBJECTIVE STATEMENT
ptis AOX4 present to the ED states he woke up with heart racing, denies chest pains. similar episode few months ago. hx heart valve repair 8 years ago, schedule for valve replacement this coming Wednesday 6/12/24. history of htn.

## 2024-06-08 NOTE — ED ADULT TRIAGE NOTE - CHIEF COMPLAINT QUOTE
woke up with heart racing, denies chest pains. similar episode few months ago. hx heart valve repair 8 years ago, schedule for valve replacement this coming Wednesday 6/12/24.

## 2024-06-08 NOTE — ED ADULT NURSE REASSESSMENT NOTE - NS ED NURSE REASSESS COMMENT FT1
Pt HR went to 49,MD Murphy made aware and is at bedside to assess. Pt remains connected to cardiac monitor in no acute distress. Speaking in clear complete sentences, able to follow commands.

## 2024-06-09 DIAGNOSIS — N40.0 BENIGN PROSTATIC HYPERPLASIA WITHOUT LOWER URINARY TRACT SYMPTOMS: ICD-10-CM

## 2024-06-09 DIAGNOSIS — I10 ESSENTIAL (PRIMARY) HYPERTENSION: ICD-10-CM

## 2024-06-09 DIAGNOSIS — I48.92 UNSPECIFIED ATRIAL FLUTTER: ICD-10-CM

## 2024-06-09 DIAGNOSIS — Z29.9 ENCOUNTER FOR PROPHYLACTIC MEASURES, UNSPECIFIED: ICD-10-CM

## 2024-06-09 DIAGNOSIS — I34.0 NONRHEUMATIC MITRAL (VALVE) INSUFFICIENCY: ICD-10-CM

## 2024-06-09 LAB
A1C WITH ESTIMATED AVERAGE GLUCOSE RESULT: 5.9 % — HIGH (ref 4–5.6)
ANION GAP SERPL CALC-SCNC: 10 MMOL/L — SIGNIFICANT CHANGE UP (ref 5–17)
APPEARANCE UR: CLEAR — SIGNIFICANT CHANGE UP
APTT BLD: 132.9 SEC — CRITICAL HIGH (ref 24.5–35.6)
APTT BLD: 134.1 SEC — CRITICAL HIGH (ref 24.5–35.6)
APTT BLD: 75.6 SEC — HIGH (ref 24.5–35.6)
APTT BLD: >200 SEC — CRITICAL HIGH (ref 24.5–35.6)
BACTERIA # UR AUTO: NEGATIVE /HPF — SIGNIFICANT CHANGE UP
BILIRUB UR-MCNC: NEGATIVE — SIGNIFICANT CHANGE UP
BLD GP AB SCN SERPL QL: POSITIVE — SIGNIFICANT CHANGE UP
BUN SERPL-MCNC: 15 MG/DL — SIGNIFICANT CHANGE UP (ref 7–23)
CALCIUM SERPL-MCNC: 9.3 MG/DL — SIGNIFICANT CHANGE UP (ref 8.4–10.5)
CAST: 0 /LPF — SIGNIFICANT CHANGE UP (ref 0–4)
CHLORIDE SERPL-SCNC: 104 MMOL/L — SIGNIFICANT CHANGE UP (ref 96–108)
CO2 SERPL-SCNC: 26 MMOL/L — SIGNIFICANT CHANGE UP (ref 22–31)
COLOR SPEC: YELLOW — SIGNIFICANT CHANGE UP
CREAT SERPL-MCNC: 1.21 MG/DL — SIGNIFICANT CHANGE UP (ref 0.5–1.3)
CULTURE RESULTS: NO GROWTH — SIGNIFICANT CHANGE UP
DIFF PNL FLD: NEGATIVE — SIGNIFICANT CHANGE UP
EGFR: 67 ML/MIN/1.73M2 — SIGNIFICANT CHANGE UP
ESTIMATED AVERAGE GLUCOSE: 123 MG/DL — HIGH (ref 68–114)
GLUCOSE SERPL-MCNC: 84 MG/DL — SIGNIFICANT CHANGE UP (ref 70–99)
GLUCOSE UR QL: NEGATIVE MG/DL — SIGNIFICANT CHANGE UP
HCT VFR BLD CALC: 38.2 % — LOW (ref 39–50)
HGB BLD-MCNC: 12.3 G/DL — LOW (ref 13–17)
INR BLD: 1.24 RATIO — HIGH (ref 0.85–1.18)
KETONES UR-MCNC: 15 MG/DL
LEUKOCYTE ESTERASE UR-ACNC: NEGATIVE — SIGNIFICANT CHANGE UP
MCHC RBC-ENTMCNC: 27.5 PG — SIGNIFICANT CHANGE UP (ref 27–34)
MCHC RBC-ENTMCNC: 32.2 GM/DL — SIGNIFICANT CHANGE UP (ref 32–36)
MCV RBC AUTO: 85.5 FL — SIGNIFICANT CHANGE UP (ref 80–100)
MRSA PCR RESULT.: SIGNIFICANT CHANGE UP
MUCOUS THREADS # UR AUTO: PRESENT
NITRITE UR-MCNC: POSITIVE
NRBC # BLD: 0 /100 WBCS — SIGNIFICANT CHANGE UP (ref 0–0)
PH UR: 5.5 — SIGNIFICANT CHANGE UP (ref 5–8)
PLATELET # BLD AUTO: 109 K/UL — LOW (ref 150–400)
POTASSIUM SERPL-MCNC: 4.2 MMOL/L — SIGNIFICANT CHANGE UP (ref 3.5–5.3)
POTASSIUM SERPL-SCNC: 4.2 MMOL/L — SIGNIFICANT CHANGE UP (ref 3.5–5.3)
PREALB SERPL-MCNC: 20 MG/DL — SIGNIFICANT CHANGE UP (ref 20–40)
PROT UR-MCNC: NEGATIVE MG/DL — SIGNIFICANT CHANGE UP
PROTHROM AB SERPL-ACNC: 12.9 SEC — SIGNIFICANT CHANGE UP (ref 9.5–13)
RBC # BLD: 4.47 M/UL — SIGNIFICANT CHANGE UP (ref 4.2–5.8)
RBC # FLD: 14.7 % — HIGH (ref 10.3–14.5)
RBC CASTS # UR COMP ASSIST: 3 /HPF — SIGNIFICANT CHANGE UP (ref 0–4)
REVIEW: SIGNIFICANT CHANGE UP
RH IG SCN BLD-IMP: POSITIVE — SIGNIFICANT CHANGE UP
S AUREUS DNA NOSE QL NAA+PROBE: SIGNIFICANT CHANGE UP
SODIUM SERPL-SCNC: 140 MMOL/L — SIGNIFICANT CHANGE UP (ref 135–145)
SP GR SPEC: 1.02 — SIGNIFICANT CHANGE UP (ref 1–1.03)
SPECIMEN SOURCE: SIGNIFICANT CHANGE UP
SQUAMOUS # UR AUTO: 0 /HPF — SIGNIFICANT CHANGE UP (ref 0–5)
T4 FREE SERPL-MCNC: 1.2 NG/DL — SIGNIFICANT CHANGE UP (ref 0.9–1.8)
TSH SERPL-MCNC: 2.08 UIU/ML — SIGNIFICANT CHANGE UP (ref 0.27–4.2)
UROBILINOGEN FLD QL: 1 MG/DL — SIGNIFICANT CHANGE UP (ref 0.2–1)
WBC # BLD: 5.04 K/UL — SIGNIFICANT CHANGE UP (ref 3.8–10.5)
WBC # FLD AUTO: 5.04 K/UL — SIGNIFICANT CHANGE UP (ref 3.8–10.5)
WBC UR QL: 0 /HPF — SIGNIFICANT CHANGE UP (ref 0–5)

## 2024-06-09 PROCEDURE — 86077 PHYS BLOOD BANK SERV XMATCH: CPT

## 2024-06-09 PROCEDURE — 99232 SBSQ HOSP IP/OBS MODERATE 35: CPT

## 2024-06-09 PROCEDURE — 93880 EXTRACRANIAL BILAT STUDY: CPT | Mod: 26

## 2024-06-09 RX ORDER — HYDRALAZINE HYDROCHLORIDE 50 MG/1
25 TABLET ORAL DAILY
Refills: 0 | Status: DISCONTINUED | OUTPATIENT
Start: 2024-06-09 | End: 2024-06-09

## 2024-06-09 RX ORDER — HYDRALAZINE HYDROCHLORIDE 50 MG/1
2 TABLET ORAL ONCE
Refills: 0 | Status: COMPLETED | OUTPATIENT
Start: 2024-06-09 | End: 2024-06-09

## 2024-06-09 RX ORDER — PANTOPRAZOLE SODIUM 40 MG/10ML
40 INJECTION, POWDER, FOR SOLUTION INTRAVENOUS
Refills: 0 | Status: DISCONTINUED | OUTPATIENT
Start: 2024-06-09 | End: 2024-06-12

## 2024-06-09 RX ORDER — HYDRALAZINE HYDROCHLORIDE 50 MG/1
25 TABLET ORAL
Refills: 0 | Status: DISCONTINUED | OUTPATIENT
Start: 2024-06-09 | End: 2024-06-11

## 2024-06-09 RX ADMIN — Medication 3 MILLILITER(S): at 13:12

## 2024-06-09 RX ADMIN — HYDRALAZINE HYDROCHLORIDE 25 MILLIGRAM(S): 50 TABLET ORAL at 14:33

## 2024-06-09 RX ADMIN — HYDRALAZINE HYDROCHLORIDE 25 MILLIGRAM(S): 50 TABLET ORAL at 21:51

## 2024-06-09 RX ADMIN — HYDRALAZINE HYDROCHLORIDE 2 MILLIGRAM(S): 50 TABLET ORAL at 13:12

## 2024-06-09 RX ADMIN — TAMSULOSIN HYDROCHLORIDE 0.4 MILLIGRAM(S): 0.4 CAPSULE ORAL at 21:51

## 2024-06-09 RX ADMIN — HEPARIN SODIUM 11 UNIT(S)/HR: 50 INJECTION, SOLUTION INTRAVENOUS at 05:58

## 2024-06-09 RX ADMIN — ATORVASTATIN CALCIUM 20 MILLIGRAM(S): 20 TABLET, FILM COATED ORAL at 21:51

## 2024-06-09 RX ADMIN — Medication 50 MILLIGRAM(S): at 17:03

## 2024-06-09 RX ADMIN — Medication 3 MILLILITER(S): at 21:36

## 2024-06-09 RX ADMIN — PANTOPRAZOLE SODIUM 40 MILLIGRAM(S): 40 INJECTION, POWDER, FOR SOLUTION INTRAVENOUS at 05:59

## 2024-06-09 RX ADMIN — Medication 3 MILLILITER(S): at 05:00

## 2024-06-09 RX ADMIN — Medication 50 MILLIGRAM(S): at 05:59

## 2024-06-09 RX ADMIN — HEPARIN SODIUM 9 UNIT(S)/HR: 50 INJECTION, SOLUTION INTRAVENOUS at 14:34

## 2024-06-10 LAB
ALBUMIN SERPL ELPH-MCNC: 4.1 G/DL — SIGNIFICANT CHANGE UP (ref 3.3–5)
ALP SERPL-CCNC: 55 U/L — SIGNIFICANT CHANGE UP (ref 40–120)
ALT FLD-CCNC: 19 U/L — SIGNIFICANT CHANGE UP (ref 10–45)
ANION GAP SERPL CALC-SCNC: 11 MMOL/L — SIGNIFICANT CHANGE UP (ref 5–17)
APTT BLD: 102.6 SEC — HIGH (ref 24.5–35.6)
APTT BLD: 73.4 SEC — HIGH (ref 24.5–35.6)
AST SERPL-CCNC: 29 U/L — SIGNIFICANT CHANGE UP (ref 10–40)
BILIRUB SERPL-MCNC: 1.2 MG/DL — SIGNIFICANT CHANGE UP (ref 0.2–1.2)
BUN SERPL-MCNC: 15 MG/DL — SIGNIFICANT CHANGE UP (ref 7–23)
CALCIUM SERPL-MCNC: 9.4 MG/DL — SIGNIFICANT CHANGE UP (ref 8.4–10.5)
CHLORIDE SERPL-SCNC: 103 MMOL/L — SIGNIFICANT CHANGE UP (ref 96–108)
CO2 SERPL-SCNC: 25 MMOL/L — SIGNIFICANT CHANGE UP (ref 22–31)
CREAT SERPL-MCNC: 1.28 MG/DL — SIGNIFICANT CHANGE UP (ref 0.5–1.3)
EGFR: 63 ML/MIN/1.73M2 — SIGNIFICANT CHANGE UP
GLUCOSE BLDC GLUCOMTR-MCNC: 81 MG/DL — SIGNIFICANT CHANGE UP (ref 70–99)
GLUCOSE BLDC GLUCOMTR-MCNC: 87 MG/DL — SIGNIFICANT CHANGE UP (ref 70–99)
GLUCOSE SERPL-MCNC: 82 MG/DL — SIGNIFICANT CHANGE UP (ref 70–99)
HCT VFR BLD CALC: 38.1 % — LOW (ref 39–50)
HGB BLD-MCNC: 12.2 G/DL — LOW (ref 13–17)
MCHC RBC-ENTMCNC: 27 PG — SIGNIFICANT CHANGE UP (ref 27–34)
MCHC RBC-ENTMCNC: 32 GM/DL — SIGNIFICANT CHANGE UP (ref 32–36)
MCV RBC AUTO: 84.3 FL — SIGNIFICANT CHANGE UP (ref 80–100)
NRBC # BLD: 0 /100 WBCS — SIGNIFICANT CHANGE UP (ref 0–0)
PLATELET # BLD AUTO: 101 K/UL — LOW (ref 150–400)
POTASSIUM SERPL-MCNC: 3.6 MMOL/L — SIGNIFICANT CHANGE UP (ref 3.5–5.3)
POTASSIUM SERPL-SCNC: 3.6 MMOL/L — SIGNIFICANT CHANGE UP (ref 3.5–5.3)
PROT SERPL-MCNC: 7.1 G/DL — SIGNIFICANT CHANGE UP (ref 6–8.3)
RBC # BLD: 4.52 M/UL — SIGNIFICANT CHANGE UP (ref 4.2–5.8)
RBC # FLD: 14.5 % — SIGNIFICANT CHANGE UP (ref 10.3–14.5)
SODIUM SERPL-SCNC: 139 MMOL/L — SIGNIFICANT CHANGE UP (ref 135–145)
WBC # BLD: 4.56 K/UL — SIGNIFICANT CHANGE UP (ref 3.8–10.5)
WBC # FLD AUTO: 4.56 K/UL — SIGNIFICANT CHANGE UP (ref 3.8–10.5)

## 2024-06-10 PROCEDURE — 99152 MOD SED SAME PHYS/QHP 5/>YRS: CPT

## 2024-06-10 PROCEDURE — 94010 BREATHING CAPACITY TEST: CPT | Mod: 26

## 2024-06-10 PROCEDURE — 99232 SBSQ HOSP IP/OBS MODERATE 35: CPT

## 2024-06-10 PROCEDURE — 93456 R HRT CORONARY ARTERY ANGIO: CPT | Mod: 26

## 2024-06-10 RX ORDER — HEPARIN SODIUM 50 [USP'U]/ML
700 INJECTION, SOLUTION INTRAVENOUS
Qty: 25000 | Refills: 0 | Status: DISCONTINUED | OUTPATIENT
Start: 2024-06-10 | End: 2024-06-10

## 2024-06-10 RX ORDER — SODIUM CHLORIDE 0.9 % (FLUSH) 0.9 %
250 SYRINGE (ML) INJECTION ONCE
Refills: 0 | Status: COMPLETED | OUTPATIENT
Start: 2024-06-10 | End: 2024-06-10

## 2024-06-10 RX ORDER — HEPARIN SODIUM 50 [USP'U]/ML
700 INJECTION, SOLUTION INTRAVENOUS
Qty: 25000 | Refills: 0 | Status: DISCONTINUED | OUTPATIENT
Start: 2024-06-10 | End: 2024-06-11

## 2024-06-10 RX ORDER — METOPROLOL TARTRATE 50 MG
25 TABLET ORAL
Refills: 0 | Status: DISCONTINUED | OUTPATIENT
Start: 2024-06-10 | End: 2024-06-12

## 2024-06-10 RX ORDER — SODIUM CHLORIDE 0.9 % (FLUSH) 0.9 %
1000 SYRINGE (ML) INJECTION
Refills: 0 | Status: DISCONTINUED | OUTPATIENT
Start: 2024-06-10 | End: 2024-06-10

## 2024-06-10 RX ORDER — POTASSIUM CHLORIDE 600 MG/1
40 TABLET, FILM COATED, EXTENDED RELEASE ORAL ONCE
Refills: 0 | Status: COMPLETED | OUTPATIENT
Start: 2024-06-10 | End: 2024-06-10

## 2024-06-10 RX ADMIN — Medication 3 MILLILITER(S): at 05:12

## 2024-06-10 RX ADMIN — Medication 3 MILLILITER(S): at 23:12

## 2024-06-10 RX ADMIN — HYDRALAZINE HYDROCHLORIDE 25 MILLIGRAM(S): 50 TABLET ORAL at 17:38

## 2024-06-10 RX ADMIN — ATORVASTATIN CALCIUM 20 MILLIGRAM(S): 20 TABLET, FILM COATED ORAL at 22:00

## 2024-06-10 RX ADMIN — Medication 750 MILLILITER(S): at 15:15

## 2024-06-10 RX ADMIN — Medication 25 MILLIGRAM(S): at 05:59

## 2024-06-10 RX ADMIN — PANTOPRAZOLE SODIUM 40 MILLIGRAM(S): 40 INJECTION, POWDER, FOR SOLUTION INTRAVENOUS at 06:00

## 2024-06-10 RX ADMIN — HEPARIN SODIUM 7 UNIT(S)/HR: 50 INJECTION, SOLUTION INTRAVENOUS at 08:14

## 2024-06-10 RX ADMIN — Medication 3 MILLILITER(S): at 14:15

## 2024-06-10 RX ADMIN — TAMSULOSIN HYDROCHLORIDE 0.4 MILLIGRAM(S): 0.4 CAPSULE ORAL at 22:00

## 2024-06-10 RX ADMIN — POTASSIUM CHLORIDE 40 MILLIEQUIVALENT(S): 600 TABLET, FILM COATED, EXTENDED RELEASE ORAL at 08:04

## 2024-06-10 RX ADMIN — HYDRALAZINE HYDROCHLORIDE 25 MILLIGRAM(S): 50 TABLET ORAL at 05:59

## 2024-06-10 RX ADMIN — HEPARIN SODIUM 8 UNIT(S)/HR: 50 INJECTION, SOLUTION INTRAVENOUS at 05:59

## 2024-06-11 ENCOUNTER — TRANSCRIPTION ENCOUNTER (OUTPATIENT)
Age: 64
End: 2024-06-11

## 2024-06-11 LAB
ANION GAP SERPL CALC-SCNC: 17 MMOL/L — SIGNIFICANT CHANGE UP (ref 5–17)
APTT BLD: 58.6 SEC — HIGH (ref 24.5–35.6)
APTT BLD: 65.4 SEC — HIGH (ref 24.5–35.6)
APTT BLD: 81.7 SEC — HIGH (ref 24.5–35.6)
BLD GP AB SCN SERPL QL: NEGATIVE — SIGNIFICANT CHANGE UP
BUN SERPL-MCNC: 15 MG/DL — SIGNIFICANT CHANGE UP (ref 7–23)
CALCIUM SERPL-MCNC: 9.5 MG/DL — SIGNIFICANT CHANGE UP (ref 8.4–10.5)
CHLORIDE SERPL-SCNC: 105 MMOL/L — SIGNIFICANT CHANGE UP (ref 96–108)
CO2 SERPL-SCNC: 21 MMOL/L — LOW (ref 22–31)
CREAT SERPL-MCNC: 1.16 MG/DL — SIGNIFICANT CHANGE UP (ref 0.5–1.3)
EGFR: 71 ML/MIN/1.73M2 — SIGNIFICANT CHANGE UP
GLUCOSE SERPL-MCNC: 78 MG/DL — SIGNIFICANT CHANGE UP (ref 70–99)
HCT VFR BLD CALC: 38.4 % — LOW (ref 39–50)
HCT VFR BLD CALC: 40.1 % — SIGNIFICANT CHANGE UP (ref 39–50)
HGB BLD-MCNC: 12.5 G/DL — LOW (ref 13–17)
HGB BLD-MCNC: 12.8 G/DL — LOW (ref 13–17)
MAGNESIUM SERPL-MCNC: 2 MG/DL — SIGNIFICANT CHANGE UP (ref 1.6–2.6)
MCHC RBC-ENTMCNC: 26.8 PG — LOW (ref 27–34)
MCHC RBC-ENTMCNC: 27.2 PG — SIGNIFICANT CHANGE UP (ref 27–34)
MCHC RBC-ENTMCNC: 31.9 GM/DL — LOW (ref 32–36)
MCHC RBC-ENTMCNC: 32.6 GM/DL — SIGNIFICANT CHANGE UP (ref 32–36)
MCV RBC AUTO: 82.4 FL — SIGNIFICANT CHANGE UP (ref 80–100)
MCV RBC AUTO: 85.1 FL — SIGNIFICANT CHANGE UP (ref 80–100)
NRBC # BLD: 0 /100 WBCS — SIGNIFICANT CHANGE UP (ref 0–0)
NRBC # BLD: 0 /100 WBCS — SIGNIFICANT CHANGE UP (ref 0–0)
PHOSPHATE SERPL-MCNC: 3.6 MG/DL — SIGNIFICANT CHANGE UP (ref 2.5–4.5)
PLATELET # BLD AUTO: 101 K/UL — LOW (ref 150–400)
PLATELET # BLD AUTO: 103 K/UL — LOW (ref 150–400)
POTASSIUM SERPL-MCNC: 3.7 MMOL/L — SIGNIFICANT CHANGE UP (ref 3.5–5.3)
POTASSIUM SERPL-SCNC: 3.7 MMOL/L — SIGNIFICANT CHANGE UP (ref 3.5–5.3)
RBC # BLD: 4.66 M/UL — SIGNIFICANT CHANGE UP (ref 4.2–5.8)
RBC # BLD: 4.71 M/UL — SIGNIFICANT CHANGE UP (ref 4.2–5.8)
RBC # FLD: 14.5 % — SIGNIFICANT CHANGE UP (ref 10.3–14.5)
RBC # FLD: 14.6 % — HIGH (ref 10.3–14.5)
RH IG SCN BLD-IMP: POSITIVE — SIGNIFICANT CHANGE UP
SODIUM SERPL-SCNC: 143 MMOL/L — SIGNIFICANT CHANGE UP (ref 135–145)
WBC # BLD: 5.34 K/UL — SIGNIFICANT CHANGE UP (ref 3.8–10.5)
WBC # BLD: 5.77 K/UL — SIGNIFICANT CHANGE UP (ref 3.8–10.5)
WBC # FLD AUTO: 5.34 K/UL — SIGNIFICANT CHANGE UP (ref 3.8–10.5)
WBC # FLD AUTO: 5.77 K/UL — SIGNIFICANT CHANGE UP (ref 3.8–10.5)

## 2024-06-11 PROCEDURE — 71045 X-RAY EXAM CHEST 1 VIEW: CPT | Mod: 26

## 2024-06-11 RX ORDER — ACETAMINOPHEN 325 MG
650 TABLET ORAL EVERY 6 HOURS
Refills: 0 | Status: COMPLETED | OUTPATIENT
Start: 2024-06-15 | End: 2025-05-14

## 2024-06-11 RX ORDER — POTASSIUM CHLORIDE 600 MG/1
10 TABLET, FILM COATED, EXTENDED RELEASE ORAL
Refills: 0 | Status: DISCONTINUED | OUTPATIENT
Start: 2024-06-12 | End: 2024-06-14

## 2024-06-11 RX ORDER — POTASSIUM CHLORIDE 600 MG/1
40 TABLET, FILM COATED, EXTENDED RELEASE ORAL ONCE
Refills: 0 | Status: COMPLETED | OUTPATIENT
Start: 2024-06-11 | End: 2024-06-11

## 2024-06-11 RX ORDER — METOCLOPRAMIDE 5 MG/5ML
10 SOLUTION ORAL EVERY 8 HOURS
Refills: 0 | Status: COMPLETED | OUTPATIENT
Start: 2024-06-12 | End: 2024-06-14

## 2024-06-11 RX ORDER — AMIODARONE HYDROCHLORIDE 50 MG/ML
400 INJECTION, SOLUTION INTRAVENOUS
Refills: 0 | Status: DISCONTINUED | OUTPATIENT
Start: 2024-06-12 | End: 2024-06-14

## 2024-06-11 RX ORDER — HEPARIN SODIUM 50 [USP'U]/ML
750 INJECTION, SOLUTION INTRAVENOUS
Qty: 25000 | Refills: 0 | Status: DISCONTINUED | OUTPATIENT
Start: 2024-06-11 | End: 2024-06-12

## 2024-06-11 RX ORDER — HYDRALAZINE HYDROCHLORIDE 50 MG/1
25 TABLET ORAL EVERY 8 HOURS
Refills: 0 | Status: DISCONTINUED | OUTPATIENT
Start: 2024-06-11 | End: 2024-06-12

## 2024-06-11 RX ORDER — ACETAMINOPHEN 325 MG
650 TABLET ORAL EVERY 6 HOURS
Refills: 0 | Status: COMPLETED | OUTPATIENT
Start: 2024-06-12 | End: 2024-06-15

## 2024-06-11 RX ORDER — SODIUM CHLORIDE 0.9 % (FLUSH) 0.9 %
1000 SYRINGE (ML) INJECTION
Refills: 0 | Status: DISCONTINUED | OUTPATIENT
Start: 2024-06-12 | End: 2024-06-16

## 2024-06-11 RX ORDER — DEXTROSE 30 % IN WATER 30 %
50 VIAL (ML) INTRAVENOUS
Refills: 0 | Status: DISCONTINUED | OUTPATIENT
Start: 2024-06-12 | End: 2024-06-16

## 2024-06-11 RX ORDER — INSULIN REGULAR, HUMAN 100/ML
3 VIAL (ML) INJECTION
Qty: 100 | Refills: 0 | Status: DISCONTINUED | OUTPATIENT
Start: 2024-06-12 | End: 2024-06-14

## 2024-06-11 RX ORDER — BISACODYL 5 MG
10 TABLET, DELAYED RELEASE (ENTERIC COATED) ORAL ONCE
Refills: 0 | Status: COMPLETED | OUTPATIENT
Start: 2024-06-14

## 2024-06-11 RX ORDER — ACETAMINOPHEN 325 MG
1000 TABLET ORAL ONCE
Refills: 0 | Status: COMPLETED | OUTPATIENT
Start: 2024-06-11 | End: 2024-06-12

## 2024-06-11 RX ORDER — OXYCODONE HYDROCHLORIDE 100 MG/5ML
5 SOLUTION ORAL EVERY 4 HOURS
Refills: 0 | Status: DISCONTINUED | OUTPATIENT
Start: 2024-06-12 | End: 2024-06-19

## 2024-06-11 RX ORDER — HYDROMORPHONE HCL 0.2 MG/ML
0.5 INJECTION, SOLUTION INTRAVENOUS EVERY 6 HOURS
Refills: 0 | Status: DISCONTINUED | OUTPATIENT
Start: 2024-06-12 | End: 2024-06-13

## 2024-06-11 RX ORDER — POLYETHYLENE GLYCOL 3350 1 G/G
17 POWDER ORAL DAILY
Refills: 0 | Status: DISCONTINUED | OUTPATIENT
Start: 2024-06-13 | End: 2024-06-19

## 2024-06-11 RX ORDER — GABAPENTIN
100 POWDER (GRAM) MISCELLANEOUS EVERY 8 HOURS
Refills: 0 | Status: DISCONTINUED | OUTPATIENT
Start: 2024-06-12 | End: 2024-06-16

## 2024-06-11 RX ORDER — OXYCODONE HYDROCHLORIDE 100 MG/5ML
10 SOLUTION ORAL EVERY 4 HOURS
Refills: 0 | Status: DISCONTINUED | OUTPATIENT
Start: 2024-06-12 | End: 2024-06-19

## 2024-06-11 RX ORDER — PANTOPRAZOLE SODIUM 40 MG/10ML
40 INJECTION, POWDER, FOR SOLUTION INTRAVENOUS DAILY
Refills: 0 | Status: DISCONTINUED | OUTPATIENT
Start: 2024-06-12 | End: 2024-06-13

## 2024-06-11 RX ORDER — GABAPENTIN
300 POWDER (GRAM) MISCELLANEOUS ONCE
Refills: 0 | Status: COMPLETED | OUTPATIENT
Start: 2024-06-11 | End: 2024-06-12

## 2024-06-11 RX ORDER — DEXTROSE 30 % IN WATER 30 %
25 VIAL (ML) INTRAVENOUS
Refills: 0 | Status: DISCONTINUED | OUTPATIENT
Start: 2024-06-12 | End: 2024-06-14

## 2024-06-11 RX ORDER — ASPIRIN 325 MG/1
81 TABLET, FILM COATED ORAL DAILY
Refills: 0 | Status: DISCONTINUED | OUTPATIENT
Start: 2024-06-12 | End: 2024-06-19

## 2024-06-11 RX ORDER — CEFUROXIME SODIUM 7.5 G
1500 VIAL (EA) INTRAVENOUS ONCE
Refills: 0 | Status: DISCONTINUED | OUTPATIENT
Start: 2024-06-11 | End: 2024-06-12

## 2024-06-11 RX ORDER — SENNOSIDES 8.6 MG
2 TABLET ORAL AT BEDTIME
Refills: 0 | Status: DISCONTINUED | OUTPATIENT
Start: 2024-06-13 | End: 2024-06-19

## 2024-06-11 RX ADMIN — HEPARIN SODIUM 7.5 UNIT(S)/HR: 50 INJECTION, SOLUTION INTRAVENOUS at 22:33

## 2024-06-11 RX ADMIN — HYDRALAZINE HYDROCHLORIDE 25 MILLIGRAM(S): 50 TABLET ORAL at 14:49

## 2024-06-11 RX ADMIN — HYDRALAZINE HYDROCHLORIDE 25 MILLIGRAM(S): 50 TABLET ORAL at 05:32

## 2024-06-11 RX ADMIN — Medication 25 MILLIGRAM(S): at 05:32

## 2024-06-11 RX ADMIN — Medication 3 MILLILITER(S): at 22:21

## 2024-06-11 RX ADMIN — Medication 1 APPLICATION(S): at 22:18

## 2024-06-11 RX ADMIN — POTASSIUM CHLORIDE 40 MILLIEQUIVALENT(S): 600 TABLET, FILM COATED, EXTENDED RELEASE ORAL at 10:18

## 2024-06-11 RX ADMIN — Medication 2000 MILLIGRAM(S): at 22:23

## 2024-06-11 RX ADMIN — TAMSULOSIN HYDROCHLORIDE 0.4 MILLIGRAM(S): 0.4 CAPSULE ORAL at 22:23

## 2024-06-11 RX ADMIN — HYDRALAZINE HYDROCHLORIDE 25 MILLIGRAM(S): 50 TABLET ORAL at 22:23

## 2024-06-11 RX ADMIN — Medication 3 MILLILITER(S): at 14:50

## 2024-06-11 RX ADMIN — HEPARIN SODIUM 7.5 UNIT(S)/HR: 50 INJECTION, SOLUTION INTRAVENOUS at 07:15

## 2024-06-11 RX ADMIN — PANTOPRAZOLE SODIUM 40 MILLIGRAM(S): 40 INJECTION, POWDER, FOR SOLUTION INTRAVENOUS at 05:32

## 2024-06-11 RX ADMIN — Medication 3 MILLILITER(S): at 06:44

## 2024-06-11 RX ADMIN — Medication 25 MILLIGRAM(S): at 16:56

## 2024-06-11 RX ADMIN — ATORVASTATIN CALCIUM 20 MILLIGRAM(S): 20 TABLET, FILM COATED ORAL at 22:23

## 2024-06-12 ENCOUNTER — RESULT REVIEW (OUTPATIENT)
Age: 64
End: 2024-06-12

## 2024-06-12 ENCOUNTER — APPOINTMENT (OUTPATIENT)
Dept: CARDIOTHORACIC SURGERY | Facility: HOSPITAL | Age: 64
End: 2024-06-12

## 2024-06-12 LAB
ALBUMIN SERPL ELPH-MCNC: 3.6 G/DL — SIGNIFICANT CHANGE UP (ref 3.3–5)
ALBUMIN SERPL ELPH-MCNC: 4.4 G/DL — SIGNIFICANT CHANGE UP (ref 3.3–5)
ALP SERPL-CCNC: 34 U/L — LOW (ref 40–120)
ALP SERPL-CCNC: 61 U/L — SIGNIFICANT CHANGE UP (ref 40–120)
ALT FLD-CCNC: 19 U/L — SIGNIFICANT CHANGE UP (ref 10–45)
ALT FLD-CCNC: 26 U/L — SIGNIFICANT CHANGE UP (ref 10–45)
ANION GAP SERPL CALC-SCNC: 16 MMOL/L — SIGNIFICANT CHANGE UP (ref 5–17)
ANION GAP SERPL CALC-SCNC: 17 MMOL/L — SIGNIFICANT CHANGE UP (ref 5–17)
APTT BLD: 29.9 SEC — SIGNIFICANT CHANGE UP (ref 24.5–35.6)
APTT BLD: 32.5 SEC — SIGNIFICANT CHANGE UP (ref 24.5–35.6)
AST SERPL-CCNC: 45 U/L — HIGH (ref 10–40)
AST SERPL-CCNC: 62 U/L — HIGH (ref 10–40)
BASE EXCESS BLDV CALC-SCNC: -0.3 MMOL/L — SIGNIFICANT CHANGE UP (ref -2–3)
BASE EXCESS BLDV CALC-SCNC: -0.9 MMOL/L — SIGNIFICANT CHANGE UP (ref -2–3)
BASE EXCESS BLDV CALC-SCNC: -1.2 MMOL/L — SIGNIFICANT CHANGE UP (ref -2–3)
BASE EXCESS BLDV CALC-SCNC: -1.5 MMOL/L — SIGNIFICANT CHANGE UP (ref -2–3)
BASE EXCESS BLDV CALC-SCNC: -1.6 MMOL/L — SIGNIFICANT CHANGE UP (ref -2–3)
BASE EXCESS BLDV CALC-SCNC: -2.1 MMOL/L — LOW (ref -2–3)
BASE EXCESS BLDV CALC-SCNC: -2.8 MMOL/L — LOW (ref -2–3)
BASE EXCESS BLDV CALC-SCNC: -2.9 MMOL/L — LOW (ref -2–3)
BASE EXCESS BLDV CALC-SCNC: -4.9 MMOL/L — LOW (ref -2–3)
BASOPHILS # BLD AUTO: 0.02 K/UL — SIGNIFICANT CHANGE UP (ref 0–0.2)
BASOPHILS NFR BLD AUTO: 0.4 % — SIGNIFICANT CHANGE UP (ref 0–2)
BILIRUB SERPL-MCNC: 1.4 MG/DL — HIGH (ref 0.2–1.2)
BILIRUB SERPL-MCNC: 1.5 MG/DL — HIGH (ref 0.2–1.2)
BUN SERPL-MCNC: 11 MG/DL — SIGNIFICANT CHANGE UP (ref 7–23)
BUN SERPL-MCNC: 14 MG/DL — SIGNIFICANT CHANGE UP (ref 7–23)
CA-I SERPL-SCNC: 0.95 MMOL/L — LOW (ref 1.15–1.33)
CA-I SERPL-SCNC: 0.97 MMOL/L — LOW (ref 1.15–1.33)
CA-I SERPL-SCNC: 0.99 MMOL/L — LOW (ref 1.15–1.33)
CA-I SERPL-SCNC: 1 MMOL/L — LOW (ref 1.15–1.33)
CA-I SERPL-SCNC: 1.01 MMOL/L — LOW (ref 1.15–1.33)
CA-I SERPL-SCNC: 1.02 MMOL/L — LOW (ref 1.15–1.33)
CA-I SERPL-SCNC: 1.19 MMOL/L — SIGNIFICANT CHANGE UP (ref 1.15–1.33)
CA-I SERPL-SCNC: 1.19 MMOL/L — SIGNIFICANT CHANGE UP (ref 1.15–1.33)
CA-I SERPL-SCNC: 1.23 MMOL/L — SIGNIFICANT CHANGE UP (ref 1.15–1.33)
CALCIUM SERPL-MCNC: 8.9 MG/DL — SIGNIFICANT CHANGE UP (ref 8.4–10.5)
CALCIUM SERPL-MCNC: 9.6 MG/DL — SIGNIFICANT CHANGE UP (ref 8.4–10.5)
CHLORIDE BLDV-SCNC: 103 MMOL/L — SIGNIFICANT CHANGE UP (ref 96–108)
CHLORIDE BLDV-SCNC: 105 MMOL/L — SIGNIFICANT CHANGE UP (ref 96–108)
CHLORIDE BLDV-SCNC: 106 MMOL/L — SIGNIFICANT CHANGE UP (ref 96–108)
CHLORIDE BLDV-SCNC: 107 MMOL/L — SIGNIFICANT CHANGE UP (ref 96–108)
CHLORIDE BLDV-SCNC: 108 MMOL/L — SIGNIFICANT CHANGE UP (ref 96–108)
CHLORIDE BLDV-SCNC: 108 MMOL/L — SIGNIFICANT CHANGE UP (ref 96–108)
CHLORIDE BLDV-SCNC: 109 MMOL/L — HIGH (ref 96–108)
CHLORIDE SERPL-SCNC: 105 MMOL/L — SIGNIFICANT CHANGE UP (ref 96–108)
CHLORIDE SERPL-SCNC: 108 MMOL/L — SIGNIFICANT CHANGE UP (ref 96–108)
CK MB BLD-MCNC: 9.5 % — HIGH (ref 0–3.5)
CK MB CFR SERPL CALC: 57.3 NG/ML — HIGH (ref 0–6.7)
CK SERPL-CCNC: 603 U/L — HIGH (ref 30–200)
CO2 BLDV-SCNC: 24 MMOL/L — SIGNIFICANT CHANGE UP (ref 22–26)
CO2 BLDV-SCNC: 25 MMOL/L — SIGNIFICANT CHANGE UP (ref 22–26)
CO2 BLDV-SCNC: 26 MMOL/L — SIGNIFICANT CHANGE UP (ref 22–26)
CO2 BLDV-SCNC: 27 MMOL/L — HIGH (ref 22–26)
CO2 BLDV-SCNC: 27 MMOL/L — HIGH (ref 22–26)
CO2 BLDV-SCNC: 28 MMOL/L — HIGH (ref 22–26)
CO2 SERPL-SCNC: 20 MMOL/L — LOW (ref 22–31)
CO2 SERPL-SCNC: 21 MMOL/L — LOW (ref 22–31)
CREAT SERPL-MCNC: 1.11 MG/DL — SIGNIFICANT CHANGE UP (ref 0.5–1.3)
CREAT SERPL-MCNC: 1.15 MG/DL — SIGNIFICANT CHANGE UP (ref 0.5–1.3)
EGFR: 72 ML/MIN/1.73M2 — SIGNIFICANT CHANGE UP
EGFR: 75 ML/MIN/1.73M2 — SIGNIFICANT CHANGE UP
EOSINOPHIL # BLD AUTO: 0.02 K/UL — SIGNIFICANT CHANGE UP (ref 0–0.5)
EOSINOPHIL NFR BLD AUTO: 0.4 % — SIGNIFICANT CHANGE UP (ref 0–6)
FIBRINOGEN PPP-MCNC: 180 MG/DL — LOW (ref 200–445)
GAS PNL BLDA: SIGNIFICANT CHANGE UP
GAS PNL BLDV: 137 MMOL/L — SIGNIFICANT CHANGE UP (ref 136–145)
GAS PNL BLDV: 138 MMOL/L — SIGNIFICANT CHANGE UP (ref 136–145)
GAS PNL BLDV: 139 MMOL/L — SIGNIFICANT CHANGE UP (ref 136–145)
GAS PNL BLDV: 139 MMOL/L — SIGNIFICANT CHANGE UP (ref 136–145)
GAS PNL BLDV: 140 MMOL/L — SIGNIFICANT CHANGE UP (ref 136–145)
GAS PNL BLDV: 140 MMOL/L — SIGNIFICANT CHANGE UP (ref 136–145)
GAS PNL BLDV: 141 MMOL/L — SIGNIFICANT CHANGE UP (ref 136–145)
GAS PNL BLDV: 141 MMOL/L — SIGNIFICANT CHANGE UP (ref 136–145)
GAS PNL BLDV: 142 MMOL/L — SIGNIFICANT CHANGE UP (ref 136–145)
GAS PNL BLDV: SIGNIFICANT CHANGE UP
GLUCOSE BLDC GLUCOMTR-MCNC: 119 MG/DL — HIGH (ref 70–99)
GLUCOSE BLDC GLUCOMTR-MCNC: 125 MG/DL — HIGH (ref 70–99)
GLUCOSE BLDC GLUCOMTR-MCNC: 130 MG/DL — HIGH (ref 70–99)
GLUCOSE BLDC GLUCOMTR-MCNC: 144 MG/DL — HIGH (ref 70–99)
GLUCOSE BLDC GLUCOMTR-MCNC: 99 MG/DL — SIGNIFICANT CHANGE UP (ref 70–99)
GLUCOSE BLDV-MCNC: 101 MG/DL — HIGH (ref 70–99)
GLUCOSE BLDV-MCNC: 103 MG/DL — HIGH (ref 70–99)
GLUCOSE BLDV-MCNC: 104 MG/DL — HIGH (ref 70–99)
GLUCOSE BLDV-MCNC: 107 MG/DL — HIGH (ref 70–99)
GLUCOSE BLDV-MCNC: 113 MG/DL — HIGH (ref 70–99)
GLUCOSE BLDV-MCNC: 115 MG/DL — HIGH (ref 70–99)
GLUCOSE BLDV-MCNC: 120 MG/DL — HIGH (ref 70–99)
GLUCOSE BLDV-MCNC: 87 MG/DL — SIGNIFICANT CHANGE UP (ref 70–99)
GLUCOSE BLDV-MCNC: 98 MG/DL — SIGNIFICANT CHANGE UP (ref 70–99)
GLUCOSE SERPL-MCNC: 86 MG/DL — SIGNIFICANT CHANGE UP (ref 70–99)
GLUCOSE SERPL-MCNC: 95 MG/DL — SIGNIFICANT CHANGE UP (ref 70–99)
HCO3 BLDV-SCNC: 22 MMOL/L — SIGNIFICANT CHANGE UP (ref 22–29)
HCO3 BLDV-SCNC: 24 MMOL/L — SIGNIFICANT CHANGE UP (ref 22–29)
HCO3 BLDV-SCNC: 25 MMOL/L — SIGNIFICANT CHANGE UP (ref 22–29)
HCO3 BLDV-SCNC: 26 MMOL/L — SIGNIFICANT CHANGE UP (ref 22–29)
HCT VFR BLD CALC: 22.8 % — LOW (ref 39–50)
HCT VFR BLD CALC: 38.5 % — LOW (ref 39–50)
HCT VFR BLDA CALC: 23 % — LOW (ref 39–51)
HCT VFR BLDA CALC: 24 % — LOW (ref 39–51)
HCT VFR BLDA CALC: 24 % — LOW (ref 39–51)
HCT VFR BLDA CALC: 25 % — LOW (ref 39–51)
HCT VFR BLDA CALC: 26 % — LOW (ref 39–51)
HCT VFR BLDA CALC: 26 % — LOW (ref 39–51)
HCT VFR BLDA CALC: 27 % — LOW (ref 39–51)
HCT VFR BLDA CALC: 28 % — LOW (ref 39–51)
HCT VFR BLDA CALC: 31 % — LOW (ref 39–51)
HEPARINASE TEG R TIME: 5.7 MIN — SIGNIFICANT CHANGE UP (ref 4.3–8.3)
HGB BLD CALC-MCNC: 10.3 G/DL — LOW (ref 12.6–17.4)
HGB BLD CALC-MCNC: 7.8 G/DL — LOW (ref 12.6–17.4)
HGB BLD CALC-MCNC: 7.9 G/DL — LOW (ref 12.6–17.4)
HGB BLD CALC-MCNC: 7.9 G/DL — LOW (ref 12.6–17.4)
HGB BLD CALC-MCNC: 8.2 G/DL — LOW (ref 12.6–17.4)
HGB BLD CALC-MCNC: 8.5 G/DL — LOW (ref 12.6–17.4)
HGB BLD CALC-MCNC: 8.7 G/DL — LOW (ref 12.6–17.4)
HGB BLD CALC-MCNC: 9 G/DL — LOW (ref 12.6–17.4)
HGB BLD CALC-MCNC: 9.2 G/DL — LOW (ref 12.6–17.4)
HGB BLD-MCNC: 12.8 G/DL — LOW (ref 13–17)
HGB BLD-MCNC: 7.5 G/DL — LOW (ref 13–17)
HOROWITZ INDEX BLDV+IHG-RTO: 100 — SIGNIFICANT CHANGE UP
HOROWITZ INDEX BLDV+IHG-RTO: 40 — SIGNIFICANT CHANGE UP
HOROWITZ INDEX BLDV+IHG-RTO: 50 — SIGNIFICANT CHANGE UP
IMM GRANULOCYTES NFR BLD AUTO: 1.5 % — HIGH (ref 0–0.9)
INR BLD: 1.07 RATIO — SIGNIFICANT CHANGE UP (ref 0.85–1.18)
INR BLD: 1.37 RATIO — HIGH (ref 0.85–1.18)
LACTATE BLDV-MCNC: 0.7 MMOL/L — SIGNIFICANT CHANGE UP (ref 0.5–2)
LACTATE BLDV-MCNC: 0.8 MMOL/L — SIGNIFICANT CHANGE UP (ref 0.5–2)
LACTATE BLDV-MCNC: 0.9 MMOL/L — SIGNIFICANT CHANGE UP (ref 0.5–2)
LACTATE BLDV-MCNC: 0.9 MMOL/L — SIGNIFICANT CHANGE UP (ref 0.5–2)
LACTATE BLDV-MCNC: 1.1 MMOL/L — SIGNIFICANT CHANGE UP (ref 0.5–2)
LYMPHOCYTES # BLD AUTO: 1.02 K/UL — SIGNIFICANT CHANGE UP (ref 1–3.3)
LYMPHOCYTES # BLD AUTO: 19.1 % — SIGNIFICANT CHANGE UP (ref 13–44)
MCHC RBC-ENTMCNC: 27.2 PG — SIGNIFICANT CHANGE UP (ref 27–34)
MCHC RBC-ENTMCNC: 27.6 PG — SIGNIFICANT CHANGE UP (ref 27–34)
MCHC RBC-ENTMCNC: 32.9 GM/DL — SIGNIFICANT CHANGE UP (ref 32–36)
MCHC RBC-ENTMCNC: 33.2 GM/DL — SIGNIFICANT CHANGE UP (ref 32–36)
MCV RBC AUTO: 81.7 FL — SIGNIFICANT CHANGE UP (ref 80–100)
MCV RBC AUTO: 83.8 FL — SIGNIFICANT CHANGE UP (ref 80–100)
MONOCYTES # BLD AUTO: 0.36 K/UL — SIGNIFICANT CHANGE UP (ref 0–0.9)
MONOCYTES NFR BLD AUTO: 6.8 % — SIGNIFICANT CHANGE UP (ref 2–14)
NEUTROPHILS # BLD AUTO: 3.83 K/UL — SIGNIFICANT CHANGE UP (ref 1.8–7.4)
NEUTROPHILS NFR BLD AUTO: 71.8 % — SIGNIFICANT CHANGE UP (ref 43–77)
NRBC # BLD: 0 /100 WBCS — SIGNIFICANT CHANGE UP (ref 0–0)
NRBC # BLD: 0 /100 WBCS — SIGNIFICANT CHANGE UP (ref 0–0)
PCO2 BLDV: 46 MMHG — SIGNIFICANT CHANGE UP (ref 42–55)
PCO2 BLDV: 47 MMHG — SIGNIFICANT CHANGE UP (ref 42–55)
PCO2 BLDV: 48 MMHG — SIGNIFICANT CHANGE UP (ref 42–55)
PCO2 BLDV: 48 MMHG — SIGNIFICANT CHANGE UP (ref 42–55)
PCO2 BLDV: 49 MMHG — SIGNIFICANT CHANGE UP (ref 42–55)
PCO2 BLDV: 49 MMHG — SIGNIFICANT CHANGE UP (ref 42–55)
PCO2 BLDV: 51 MMHG — SIGNIFICANT CHANGE UP (ref 42–55)
PCO2 BLDV: 53 MMHG — SIGNIFICANT CHANGE UP (ref 42–55)
PCO2 BLDV: 54 MMHG — SIGNIFICANT CHANGE UP (ref 42–55)
PH BLDV: 7.27 — LOW (ref 7.32–7.43)
PH BLDV: 7.27 — LOW (ref 7.32–7.43)
PH BLDV: 7.29 — LOW (ref 7.32–7.43)
PH BLDV: 7.29 — LOW (ref 7.32–7.43)
PH BLDV: 7.3 — LOW (ref 7.32–7.43)
PH BLDV: 7.31 — LOW (ref 7.32–7.43)
PH BLDV: 7.31 — LOW (ref 7.32–7.43)
PH BLDV: 7.33 — SIGNIFICANT CHANGE UP (ref 7.32–7.43)
PH BLDV: 7.35 — SIGNIFICANT CHANGE UP (ref 7.32–7.43)
PLATELET # BLD AUTO: 90 K/UL — LOW (ref 150–400)
PLATELET # BLD AUTO: 91 K/UL — LOW (ref 150–400)
PO2 BLDV: 44 MMHG — SIGNIFICANT CHANGE UP (ref 25–45)
PO2 BLDV: 44 MMHG — SIGNIFICANT CHANGE UP (ref 25–45)
PO2 BLDV: 53 MMHG — HIGH (ref 25–45)
PO2 BLDV: 67 MMHG — HIGH (ref 25–45)
PO2 BLDV: 72 MMHG — HIGH (ref 25–45)
PO2 BLDV: 73 MMHG — HIGH (ref 25–45)
PO2 BLDV: 79 MMHG — HIGH (ref 25–45)
PO2 BLDV: 87 MMHG — HIGH (ref 25–45)
PO2 BLDV: 96 MMHG — HIGH (ref 25–45)
POTASSIUM BLDV-SCNC: 4 MMOL/L — SIGNIFICANT CHANGE UP (ref 3.5–5.1)
POTASSIUM BLDV-SCNC: 4.2 MMOL/L — SIGNIFICANT CHANGE UP (ref 3.5–5.1)
POTASSIUM BLDV-SCNC: 4.3 MMOL/L — SIGNIFICANT CHANGE UP (ref 3.5–5.1)
POTASSIUM BLDV-SCNC: 4.4 MMOL/L — SIGNIFICANT CHANGE UP (ref 3.5–5.1)
POTASSIUM BLDV-SCNC: 5.1 MMOL/L — SIGNIFICANT CHANGE UP (ref 3.5–5.1)
POTASSIUM BLDV-SCNC: 5.3 MMOL/L — HIGH (ref 3.5–5.1)
POTASSIUM BLDV-SCNC: 5.7 MMOL/L — HIGH (ref 3.5–5.1)
POTASSIUM BLDV-SCNC: 5.7 MMOL/L — HIGH (ref 3.5–5.1)
POTASSIUM BLDV-SCNC: 6.2 MMOL/L — CRITICAL HIGH (ref 3.5–5.1)
POTASSIUM SERPL-MCNC: 3.9 MMOL/L — SIGNIFICANT CHANGE UP (ref 3.5–5.3)
POTASSIUM SERPL-MCNC: 4.2 MMOL/L — SIGNIFICANT CHANGE UP (ref 3.5–5.3)
POTASSIUM SERPL-SCNC: 3.9 MMOL/L — SIGNIFICANT CHANGE UP (ref 3.5–5.3)
POTASSIUM SERPL-SCNC: 4.2 MMOL/L — SIGNIFICANT CHANGE UP (ref 3.5–5.3)
PROT SERPL-MCNC: 5.5 G/DL — LOW (ref 6–8.3)
PROT SERPL-MCNC: 7.9 G/DL — SIGNIFICANT CHANGE UP (ref 6–8.3)
PROTHROM AB SERPL-ACNC: 11.8 SEC — SIGNIFICANT CHANGE UP (ref 9.5–13)
PROTHROM AB SERPL-ACNC: 14.3 SEC — HIGH (ref 9.5–13)
RAPIDTEG MAXIMUM AMPLITUDE: 56.6 MM — SIGNIFICANT CHANGE UP (ref 52–70)
RBC # BLD: 2.72 M/UL — LOW (ref 4.2–5.8)
RBC # BLD: 4.71 M/UL — SIGNIFICANT CHANGE UP (ref 4.2–5.8)
RBC # FLD: 14.5 % — SIGNIFICANT CHANGE UP (ref 10.3–14.5)
RBC # FLD: 14.6 % — HIGH (ref 10.3–14.5)
SAO2 % BLDV: 67.3 % — SIGNIFICANT CHANGE UP (ref 67–88)
SAO2 % BLDV: 71.2 % — SIGNIFICANT CHANGE UP (ref 67–88)
SAO2 % BLDV: 82.8 % — SIGNIFICANT CHANGE UP (ref 67–88)
SAO2 % BLDV: 96.1 % — HIGH (ref 67–88)
SAO2 % BLDV: 97 % — HIGH (ref 67–88)
SAO2 % BLDV: 97.2 % — HIGH (ref 67–88)
SAO2 % BLDV: 97.3 % — HIGH (ref 67–88)
SAO2 % BLDV: 97.5 % — HIGH (ref 67–88)
SAO2 % BLDV: 99.2 % — HIGH (ref 67–88)
SODIUM SERPL-SCNC: 141 MMOL/L — SIGNIFICANT CHANGE UP (ref 135–145)
SODIUM SERPL-SCNC: 146 MMOL/L — HIGH (ref 135–145)
TEG FUNCTIONAL FIBRINOGEN: 19.3 MM — SIGNIFICANT CHANGE UP (ref 15–32)
TEG MAXIMUM AMPLITUDE: 59.4 MM — SIGNIFICANT CHANGE UP (ref 52–69)
TEG REACTION TIME: 9 MIN — SIGNIFICANT CHANGE UP (ref 4.6–9.1)
TROPONIN T, HIGH SENSITIVITY RESULT: 1434 NG/L — HIGH (ref 0–51)
WBC # BLD: 5.33 K/UL — SIGNIFICANT CHANGE UP (ref 3.8–10.5)
WBC # BLD: 5.44 K/UL — SIGNIFICANT CHANGE UP (ref 3.8–10.5)
WBC # FLD AUTO: 5.33 K/UL — SIGNIFICANT CHANGE UP (ref 3.8–10.5)
WBC # FLD AUTO: 5.44 K/UL — SIGNIFICANT CHANGE UP (ref 3.8–10.5)

## 2024-06-12 PROCEDURE — 33530 CORONARY ARTERY BYPASS/REOP: CPT

## 2024-06-12 PROCEDURE — 33430 REPLACEMENT OF MITRAL VALVE: CPT

## 2024-06-12 PROCEDURE — 88300 SURGICAL PATH GROSS: CPT | Mod: 26,59

## 2024-06-12 PROCEDURE — 33202 INSERT EPICARD ELTRD OPEN: CPT

## 2024-06-12 PROCEDURE — 88305 TISSUE EXAM BY PATHOLOGIST: CPT | Mod: 26

## 2024-06-12 PROCEDURE — 71045 X-RAY EXAM CHEST 1 VIEW: CPT | Mod: 26

## 2024-06-12 DEVICE — COR-KNOT MINI DEVICE COMBO KIT: Type: IMPLANTABLE DEVICE | Status: FUNCTIONAL

## 2024-06-12 DEVICE — CHEST DRAIN THORACIC ARGYLE PVC 28FR STRAIGHT: Type: IMPLANTABLE DEVICE | Status: FUNCTIONAL

## 2024-06-12 DEVICE — COR-KNOT QUICK LOAD SINGLES: Type: IMPLANTABLE DEVICE | Status: FUNCTIONAL

## 2024-06-12 DEVICE — CANNULA ANTEGRADE W/VENT 12GA: Type: IMPLANTABLE DEVICE | Status: FUNCTIONAL

## 2024-06-12 DEVICE — GWIRE GUID  0.035INX150CM: Type: IMPLANTABLE DEVICE | Status: FUNCTIONAL

## 2024-06-12 DEVICE — KIT CVC 2LUM MAC 9FR CHG: Type: IMPLANTABLE DEVICE | Status: FUNCTIONAL

## 2024-06-12 DEVICE — CANNULA VENOUS HLS 23FR X 3/8": Type: IMPLANTABLE DEVICE | Status: FUNCTIONAL

## 2024-06-12 DEVICE — CANNULA VENOUS 1 STAGE RIGHT ANGLE METAL TIP 24FR X 3/8": Type: IMPLANTABLE DEVICE | Status: FUNCTIONAL

## 2024-06-12 DEVICE — CHEST DRAIN THORACIC ARGYLE PVC 28FR RIGHT ANGLE: Type: IMPLANTABLE DEVICE | Status: FUNCTIONAL

## 2024-06-12 DEVICE — PACING WIRE WHITE M-24 BAREWIRE 37MM X 89MM: Type: IMPLANTABLE DEVICE | Status: FUNCTIONAL

## 2024-06-12 DEVICE — LEAD EPICARDIAL 35CM SCRW IN: Type: IMPLANTABLE DEVICE | Status: FUNCTIONAL

## 2024-06-12 DEVICE — INTRODUCER SET MICROPUNCTURE ACCESS 21G X 7CM: Type: IMPLANTABLE DEVICE | Status: FUNCTIONAL

## 2024-06-12 DEVICE — CANNULA RETROGRADE CARDIOPLEGIA SELF-INFLATING 14FR PRE-SHAPED STYLET/HANDLE: Type: IMPLANTABLE DEVICE | Status: FUNCTIONAL

## 2024-06-12 DEVICE — LIGATING CLIPS WECK HORIZON MEDIUM (BLUE) 24: Type: IMPLANTABLE DEVICE | Status: FUNCTIONAL

## 2024-06-12 DEVICE — CANNULA VENOUS HLS 25FR X 3/8": Type: IMPLANTABLE DEVICE | Status: FUNCTIONAL

## 2024-06-12 DEVICE — CANNULA ARTERIAL OPTISITE 20FR X 3/8" VENTED: Type: IMPLANTABLE DEVICE | Status: FUNCTIONAL

## 2024-06-12 DEVICE — SHEATH INTRODUCER TERUMO PINNACLE CORONARY 5FR X 10CM X 0.038" MINI WIRE: Type: IMPLANTABLE DEVICE | Status: FUNCTIONAL

## 2024-06-12 DEVICE — PACING WIRE ORANGE M-25 WINGED WIRE 37MM X 89MM: Type: IMPLANTABLE DEVICE | Status: FUNCTIONAL

## 2024-06-12 DEVICE — LIGATING CLIPS WECK HORIZON SMALL-WIDE (RED) 24: Type: IMPLANTABLE DEVICE | Status: FUNCTIONAL

## 2024-06-12 DEVICE — VALVE MITRAL MITRIS RESILIA 27MM: Type: IMPLANTABLE DEVICE | Status: FUNCTIONAL

## 2024-06-12 DEVICE — GUIDEWIRE AMPLATZ SUPER-STIFF STRAIGHT .035" X 260CM: Type: IMPLANTABLE DEVICE | Status: FUNCTIONAL

## 2024-06-12 DEVICE — KIT A-LINE 1LUM 20G X 12CM SAFE KIT: Type: IMPLANTABLE DEVICE | Status: FUNCTIONAL

## 2024-06-12 RX ORDER — SUGAMMADEX 100 MG/ML
200 INJECTION, SOLUTION INTRAVENOUS ONCE
Refills: 0 | Status: COMPLETED | OUTPATIENT
Start: 2024-06-12 | End: 2024-06-12

## 2024-06-12 RX ORDER — DEXMEDETOMIDINE HYDROCHLORIDE 4 UG/ML
0.3 INJECTION, SOLUTION INTRAVENOUS
Qty: 200 | Refills: 0 | Status: DISCONTINUED | OUTPATIENT
Start: 2024-06-12 | End: 2024-06-14

## 2024-06-12 RX ORDER — NICARDIPINE HYDROCHLORIDE 0.1 MG/ML
5 INJECTION, SOLUTION INTRAVENOUS
Qty: 40 | Refills: 0 | Status: DISCONTINUED | OUTPATIENT
Start: 2024-06-12 | End: 2024-06-14

## 2024-06-12 RX ORDER — NOREPINEPHRINE BITARTRATE 1 MG/ML
0.05 INJECTION INTRAVENOUS
Qty: 8 | Refills: 0 | Status: DISCONTINUED | OUTPATIENT
Start: 2024-06-12 | End: 2024-06-14

## 2024-06-12 RX ORDER — DOBUTAMINE HCL 250MG/20ML
5 VIAL (ML) INTRAVENOUS
Qty: 500 | Refills: 0 | Status: DISCONTINUED | OUTPATIENT
Start: 2024-06-12 | End: 2024-06-13

## 2024-06-12 RX ORDER — ALBUMIN HUMAN 5 %
250 INTRAVENOUS SOLUTION INTRAVENOUS ONCE
Refills: 0 | Status: COMPLETED | OUTPATIENT
Start: 2024-06-12 | End: 2024-06-12

## 2024-06-12 RX ORDER — CEFUROXIME SODIUM 7.5 G
1500 VIAL (EA) INTRAVENOUS EVERY 8 HOURS
Refills: 0 | Status: COMPLETED | OUTPATIENT
Start: 2024-06-12 | End: 2024-06-14

## 2024-06-12 RX ORDER — ACETAMINOPHEN 325 MG
1000 TABLET ORAL ONCE
Refills: 0 | Status: COMPLETED | OUTPATIENT
Start: 2024-06-12 | End: 2024-06-12

## 2024-06-12 RX ORDER — CALCIUM GLUCONATE 98 MG/ML
2 INJECTION, SOLUTION INTRAVENOUS ONCE
Refills: 0 | Status: COMPLETED | OUTPATIENT
Start: 2024-06-12 | End: 2024-06-12

## 2024-06-12 RX ADMIN — SUGAMMADEX 200 MILLIGRAM(S): 100 INJECTION, SOLUTION INTRAVENOUS at 16:00

## 2024-06-12 RX ADMIN — PANTOPRAZOLE SODIUM 40 MILLIGRAM(S): 40 INJECTION, POWDER, FOR SOLUTION INTRAVENOUS at 06:26

## 2024-06-12 RX ADMIN — Medication 125 MILLILITER(S): at 16:24

## 2024-06-12 RX ADMIN — Medication 11 MICROGRAM(S)/KG/MIN: at 20:07

## 2024-06-12 RX ADMIN — Medication 30 MILLILITER(S): at 06:24

## 2024-06-12 RX ADMIN — Medication 100 MILLIGRAM(S): at 20:19

## 2024-06-12 RX ADMIN — Medication 400 MILLIGRAM(S): at 21:34

## 2024-06-12 RX ADMIN — HYDROMORPHONE HCL 0.5 MILLIGRAM(S): 0.2 INJECTION, SOLUTION INTRAVENOUS at 18:45

## 2024-06-12 RX ADMIN — HYDROMORPHONE HCL 0.5 MILLIGRAM(S): 0.2 INJECTION, SOLUTION INTRAVENOUS at 19:00

## 2024-06-12 RX ADMIN — Medication 125 MILLILITER(S): at 20:54

## 2024-06-12 RX ADMIN — Medication 1000 MILLIGRAM(S): at 06:25

## 2024-06-12 RX ADMIN — Medication 1 APPLICATION(S): at 16:12

## 2024-06-12 RX ADMIN — ASPIRIN 81 MILLIGRAM(S): 325 TABLET, FILM COATED ORAL at 21:13

## 2024-06-12 RX ADMIN — Medication 100 MILLIGRAM(S): at 21:13

## 2024-06-12 RX ADMIN — HYDRALAZINE HYDROCHLORIDE 25 MILLIGRAM(S): 50 TABLET ORAL at 06:26

## 2024-06-12 RX ADMIN — PANTOPRAZOLE SODIUM 40 MILLIGRAM(S): 40 INJECTION, POWDER, FOR SOLUTION INTRAVENOUS at 16:12

## 2024-06-12 RX ADMIN — NOREPINEPHRINE BITARTRATE 6.85 MICROGRAM(S)/KG/MIN: 1 INJECTION INTRAVENOUS at 20:07

## 2024-06-12 RX ADMIN — Medication 300 MILLIGRAM(S): at 06:25

## 2024-06-12 RX ADMIN — Medication 1000 MILLIGRAM(S): at 21:35

## 2024-06-12 RX ADMIN — METOCLOPRAMIDE 10 MILLIGRAM(S): 5 SOLUTION ORAL at 21:10

## 2024-06-12 RX ADMIN — NICARDIPINE HYDROCHLORIDE 25 MG/HR: 0.1 INJECTION, SOLUTION INTRAVENOUS at 20:07

## 2024-06-12 RX ADMIN — Medication 15 MILLILITER(S): at 18:03

## 2024-06-12 RX ADMIN — Medication 25 MILLIGRAM(S): at 06:26

## 2024-06-12 RX ADMIN — CALCIUM GLUCONATE 200 GRAM(S): 98 INJECTION, SOLUTION INTRAVENOUS at 20:55

## 2024-06-13 LAB
ALBUMIN SERPL ELPH-MCNC: 4.3 G/DL — SIGNIFICANT CHANGE UP (ref 3.3–5)
ALP SERPL-CCNC: 38 U/L — LOW (ref 40–120)
ALT FLD-CCNC: 26 U/L — SIGNIFICANT CHANGE UP (ref 10–45)
ANION GAP SERPL CALC-SCNC: 17 MMOL/L — SIGNIFICANT CHANGE UP (ref 5–17)
APTT BLD: 29.3 SEC — SIGNIFICANT CHANGE UP (ref 24.5–35.6)
AST SERPL-CCNC: 109 U/L — HIGH (ref 10–40)
BASE EXCESS BLDV CALC-SCNC: -0.4 MMOL/L — SIGNIFICANT CHANGE UP (ref -2–3)
BASE EXCESS BLDV CALC-SCNC: 0.6 MMOL/L — SIGNIFICANT CHANGE UP (ref -2–3)
BASE EXCESS BLDV CALC-SCNC: 1.1 MMOL/L — SIGNIFICANT CHANGE UP (ref -2–3)
BASE EXCESS BLDV CALC-SCNC: 1.7 MMOL/L — SIGNIFICANT CHANGE UP (ref -2–3)
BASOPHILS # BLD AUTO: 0 K/UL — SIGNIFICANT CHANGE UP (ref 0–0.2)
BASOPHILS NFR BLD AUTO: 0 % — SIGNIFICANT CHANGE UP (ref 0–2)
BILIRUB SERPL-MCNC: 2.9 MG/DL — HIGH (ref 0.2–1.2)
BUN SERPL-MCNC: 10 MG/DL — SIGNIFICANT CHANGE UP (ref 7–23)
CA-I SERPL-SCNC: 1.22 MMOL/L — SIGNIFICANT CHANGE UP (ref 1.15–1.33)
CA-I SERPL-SCNC: 1.22 MMOL/L — SIGNIFICANT CHANGE UP (ref 1.15–1.33)
CA-I SERPL-SCNC: 1.25 MMOL/L — SIGNIFICANT CHANGE UP (ref 1.15–1.33)
CA-I SERPL-SCNC: 1.29 MMOL/L — SIGNIFICANT CHANGE UP (ref 1.15–1.33)
CALCIUM SERPL-MCNC: 9.5 MG/DL — SIGNIFICANT CHANGE UP (ref 8.4–10.5)
CHLORIDE BLDV-SCNC: 104 MMOL/L — SIGNIFICANT CHANGE UP (ref 96–108)
CHLORIDE BLDV-SCNC: 105 MMOL/L — SIGNIFICANT CHANGE UP (ref 96–108)
CHLORIDE BLDV-SCNC: 106 MMOL/L — SIGNIFICANT CHANGE UP (ref 96–108)
CHLORIDE BLDV-SCNC: 106 MMOL/L — SIGNIFICANT CHANGE UP (ref 96–108)
CHLORIDE SERPL-SCNC: 105 MMOL/L — SIGNIFICANT CHANGE UP (ref 96–108)
CO2 BLDV-SCNC: 28 MMOL/L — HIGH (ref 22–26)
CO2 BLDV-SCNC: 28 MMOL/L — HIGH (ref 22–26)
CO2 BLDV-SCNC: 29 MMOL/L — HIGH (ref 22–26)
CO2 BLDV-SCNC: 30 MMOL/L — HIGH (ref 22–26)
CO2 SERPL-SCNC: 22 MMOL/L — SIGNIFICANT CHANGE UP (ref 22–31)
CREAT SERPL-MCNC: 1.04 MG/DL — SIGNIFICANT CHANGE UP (ref 0.5–1.3)
CULTURE RESULTS: SIGNIFICANT CHANGE UP
CULTURE RESULTS: SIGNIFICANT CHANGE UP
EGFR: 81 ML/MIN/1.73M2 — SIGNIFICANT CHANGE UP
EOSINOPHIL # BLD AUTO: 0 K/UL — SIGNIFICANT CHANGE UP (ref 0–0.5)
EOSINOPHIL NFR BLD AUTO: 0 % — SIGNIFICANT CHANGE UP (ref 0–6)
FIBRINOGEN PPP-MCNC: 258 MG/DL — SIGNIFICANT CHANGE UP (ref 200–445)
GAS PNL BLDA: SIGNIFICANT CHANGE UP
GAS PNL BLDV: 138 MMOL/L — SIGNIFICANT CHANGE UP (ref 136–145)
GAS PNL BLDV: 139 MMOL/L — SIGNIFICANT CHANGE UP (ref 136–145)
GAS PNL BLDV: 140 MMOL/L — SIGNIFICANT CHANGE UP (ref 136–145)
GAS PNL BLDV: 140 MMOL/L — SIGNIFICANT CHANGE UP (ref 136–145)
GAS PNL BLDV: SIGNIFICANT CHANGE UP
GIANT PLATELETS BLD QL SMEAR: PRESENT — SIGNIFICANT CHANGE UP
GLUCOSE BLDC GLUCOMTR-MCNC: 115 MG/DL — HIGH (ref 70–99)
GLUCOSE BLDC GLUCOMTR-MCNC: 118 MG/DL — HIGH (ref 70–99)
GLUCOSE BLDC GLUCOMTR-MCNC: 135 MG/DL — HIGH (ref 70–99)
GLUCOSE BLDC GLUCOMTR-MCNC: 137 MG/DL — HIGH (ref 70–99)
GLUCOSE BLDC GLUCOMTR-MCNC: 147 MG/DL — HIGH (ref 70–99)
GLUCOSE BLDC GLUCOMTR-MCNC: 149 MG/DL — HIGH (ref 70–99)
GLUCOSE BLDC GLUCOMTR-MCNC: 155 MG/DL — HIGH (ref 70–99)
GLUCOSE BLDC GLUCOMTR-MCNC: 160 MG/DL — HIGH (ref 70–99)
GLUCOSE BLDC GLUCOMTR-MCNC: 174 MG/DL — HIGH (ref 70–99)
GLUCOSE BLDC GLUCOMTR-MCNC: 198 MG/DL — HIGH (ref 70–99)
GLUCOSE BLDC GLUCOMTR-MCNC: 97 MG/DL — SIGNIFICANT CHANGE UP (ref 70–99)
GLUCOSE BLDV-MCNC: 108 MG/DL — HIGH (ref 70–99)
GLUCOSE BLDV-MCNC: 124 MG/DL — HIGH (ref 70–99)
GLUCOSE BLDV-MCNC: 138 MG/DL — HIGH (ref 70–99)
GLUCOSE BLDV-MCNC: 141 MG/DL — HIGH (ref 70–99)
GLUCOSE SERPL-MCNC: 150 MG/DL — HIGH (ref 70–99)
HCO3 BLDV-SCNC: 26 MMOL/L — SIGNIFICANT CHANGE UP (ref 22–29)
HCO3 BLDV-SCNC: 27 MMOL/L — SIGNIFICANT CHANGE UP (ref 22–29)
HCO3 BLDV-SCNC: 27 MMOL/L — SIGNIFICANT CHANGE UP (ref 22–29)
HCO3 BLDV-SCNC: 28 MMOL/L — SIGNIFICANT CHANGE UP (ref 22–29)
HCT VFR BLD CALC: 26 % — LOW (ref 39–50)
HCT VFR BLDA CALC: 25 % — LOW (ref 39–51)
HCT VFR BLDA CALC: 25 % — LOW (ref 39–51)
HCT VFR BLDA CALC: 26 % — LOW (ref 39–51)
HCT VFR BLDA CALC: 26 % — LOW (ref 39–51)
HGB BLD CALC-MCNC: 8.3 G/DL — LOW (ref 12.6–17.4)
HGB BLD CALC-MCNC: 8.4 G/DL — LOW (ref 12.6–17.4)
HGB BLD CALC-MCNC: 8.6 G/DL — LOW (ref 12.6–17.4)
HGB BLD CALC-MCNC: 8.7 G/DL — LOW (ref 12.6–17.4)
HGB BLD-MCNC: 8.5 G/DL — LOW (ref 13–17)
HOROWITZ INDEX BLDV+IHG-RTO: 36 — SIGNIFICANT CHANGE UP
INR BLD: 1.21 RATIO — HIGH (ref 0.85–1.18)
LACTATE BLDV-MCNC: 0.8 MMOL/L — SIGNIFICANT CHANGE UP (ref 0.5–2)
LACTATE BLDV-MCNC: 1 MMOL/L — SIGNIFICANT CHANGE UP (ref 0.5–2)
LYMPHOCYTES # BLD AUTO: 0.25 K/UL — LOW (ref 1–3.3)
LYMPHOCYTES # BLD AUTO: 3.5 % — LOW (ref 13–44)
MAGNESIUM SERPL-MCNC: 2.6 MG/DL — SIGNIFICANT CHANGE UP (ref 1.6–2.6)
MANUAL SMEAR VERIFICATION: SIGNIFICANT CHANGE UP
MCHC RBC-ENTMCNC: 27.4 PG — SIGNIFICANT CHANGE UP (ref 27–34)
MCHC RBC-ENTMCNC: 32.7 GM/DL — SIGNIFICANT CHANGE UP (ref 32–36)
MCV RBC AUTO: 83.9 FL — SIGNIFICANT CHANGE UP (ref 80–100)
MONOCYTES # BLD AUTO: 0.44 K/UL — SIGNIFICANT CHANGE UP (ref 0–0.9)
MONOCYTES NFR BLD AUTO: 6 % — SIGNIFICANT CHANGE UP (ref 2–14)
NEUTROPHILS # BLD AUTO: 6.58 K/UL — SIGNIFICANT CHANGE UP (ref 1.8–7.4)
NEUTROPHILS NFR BLD AUTO: 88.8 % — HIGH (ref 43–77)
NEUTS BAND # BLD: 1.7 % — SIGNIFICANT CHANGE UP (ref 0–8)
PCO2 BLDV: 51 MMHG — SIGNIFICANT CHANGE UP (ref 42–55)
PCO2 BLDV: 52 MMHG — SIGNIFICANT CHANGE UP (ref 42–55)
PCO2 BLDV: 52 MMHG — SIGNIFICANT CHANGE UP (ref 42–55)
PCO2 BLDV: 55 MMHG — SIGNIFICANT CHANGE UP (ref 42–55)
PH BLDV: 7.31 — LOW (ref 7.32–7.43)
PH BLDV: 7.32 — SIGNIFICANT CHANGE UP (ref 7.32–7.43)
PH BLDV: 7.33 — SIGNIFICANT CHANGE UP (ref 7.32–7.43)
PH BLDV: 7.33 — SIGNIFICANT CHANGE UP (ref 7.32–7.43)
PHOSPHATE SERPL-MCNC: 4.4 MG/DL — SIGNIFICANT CHANGE UP (ref 2.5–4.5)
PLAT MORPH BLD: NORMAL — SIGNIFICANT CHANGE UP
PLATELET # BLD AUTO: 74 K/UL — LOW (ref 150–400)
PO2 BLDV: 39 MMHG — SIGNIFICANT CHANGE UP (ref 25–45)
PO2 BLDV: 41 MMHG — SIGNIFICANT CHANGE UP (ref 25–45)
PO2 BLDV: 42 MMHG — SIGNIFICANT CHANGE UP (ref 25–45)
PO2 BLDV: 43 MMHG — SIGNIFICANT CHANGE UP (ref 25–45)
POTASSIUM BLDV-SCNC: 4.2 MMOL/L — SIGNIFICANT CHANGE UP (ref 3.5–5.1)
POTASSIUM SERPL-MCNC: 4 MMOL/L — SIGNIFICANT CHANGE UP (ref 3.5–5.3)
POTASSIUM SERPL-SCNC: 4 MMOL/L — SIGNIFICANT CHANGE UP (ref 3.5–5.3)
PROT SERPL-MCNC: 6.4 G/DL — SIGNIFICANT CHANGE UP (ref 6–8.3)
PROTHROM AB SERPL-ACNC: 12.6 SEC — SIGNIFICANT CHANGE UP (ref 9.5–13)
RBC # BLD: 3.1 M/UL — LOW (ref 4.2–5.8)
RBC # FLD: 14.7 % — HIGH (ref 10.3–14.5)
RBC BLD AUTO: SIGNIFICANT CHANGE UP
SAO2 % BLDV: 67.2 % — SIGNIFICANT CHANGE UP (ref 67–88)
SAO2 % BLDV: 70.4 % — SIGNIFICANT CHANGE UP (ref 67–88)
SAO2 % BLDV: 71.5 % — SIGNIFICANT CHANGE UP (ref 67–88)
SAO2 % BLDV: 87.7 % — SIGNIFICANT CHANGE UP (ref 67–88)
SODIUM SERPL-SCNC: 144 MMOL/L — SIGNIFICANT CHANGE UP (ref 135–145)
SPECIMEN SOURCE: SIGNIFICANT CHANGE UP
SPECIMEN SOURCE: SIGNIFICANT CHANGE UP
WBC # BLD: 7.27 K/UL — SIGNIFICANT CHANGE UP (ref 3.8–10.5)
WBC # FLD AUTO: 7.27 K/UL — SIGNIFICANT CHANGE UP (ref 3.8–10.5)

## 2024-06-13 PROCEDURE — 99291 CRITICAL CARE FIRST HOUR: CPT

## 2024-06-13 PROCEDURE — 71045 X-RAY EXAM CHEST 1 VIEW: CPT | Mod: 26

## 2024-06-13 RX ORDER — INSULIN LISPRO 100 [IU]/ML
INJECTION, SOLUTION SUBCUTANEOUS
Refills: 0 | Status: DISCONTINUED | OUTPATIENT
Start: 2024-06-13 | End: 2024-06-16

## 2024-06-13 RX ORDER — ENOXAPARIN SODIUM 100 MG/ML
40 INJECTION SUBCUTANEOUS EVERY 24 HOURS
Refills: 0 | Status: DISCONTINUED | OUTPATIENT
Start: 2024-06-13 | End: 2024-06-14

## 2024-06-13 RX ORDER — PANTOPRAZOLE SODIUM 40 MG/10ML
40 INJECTION, POWDER, FOR SOLUTION INTRAVENOUS DAILY
Refills: 0 | Status: DISCONTINUED | OUTPATIENT
Start: 2024-06-13 | End: 2024-06-19

## 2024-06-13 RX ADMIN — METOCLOPRAMIDE 10 MILLIGRAM(S): 5 SOLUTION ORAL at 05:03

## 2024-06-13 RX ADMIN — NICARDIPINE HYDROCHLORIDE 25 MG/HR: 0.1 INJECTION, SOLUTION INTRAVENOUS at 00:34

## 2024-06-13 RX ADMIN — Medication 650 MILLIGRAM(S): at 05:05

## 2024-06-13 RX ADMIN — Medication 100 MILLIGRAM(S): at 13:38

## 2024-06-13 RX ADMIN — Medication 100 MILLIGRAM(S): at 21:25

## 2024-06-13 RX ADMIN — HYDROMORPHONE HCL 0.5 MILLIGRAM(S): 0.2 INJECTION, SOLUTION INTRAVENOUS at 00:45

## 2024-06-13 RX ADMIN — POLYETHYLENE GLYCOL 3350 17 GRAM(S): 1 POWDER ORAL at 13:37

## 2024-06-13 RX ADMIN — Medication 100 MILLIGRAM(S): at 05:04

## 2024-06-13 RX ADMIN — HYDROMORPHONE HCL 0.5 MILLIGRAM(S): 0.2 INJECTION, SOLUTION INTRAVENOUS at 01:00

## 2024-06-13 RX ADMIN — METOCLOPRAMIDE 10 MILLIGRAM(S): 5 SOLUTION ORAL at 21:25

## 2024-06-13 RX ADMIN — AMIODARONE HYDROCHLORIDE 400 MILLIGRAM(S): 50 INJECTION, SOLUTION INTRAVENOUS at 18:17

## 2024-06-13 RX ADMIN — PANTOPRAZOLE SODIUM 40 MILLIGRAM(S): 40 INJECTION, POWDER, FOR SOLUTION INTRAVENOUS at 13:39

## 2024-06-13 RX ADMIN — Medication 100 MILLIGRAM(S): at 21:24

## 2024-06-13 RX ADMIN — ENOXAPARIN SODIUM 40 MILLIGRAM(S): 100 INJECTION SUBCUTANEOUS at 18:17

## 2024-06-13 RX ADMIN — Medication 2 TABLET(S): at 21:24

## 2024-06-13 RX ADMIN — Medication 650 MILLIGRAM(S): at 19:16

## 2024-06-13 RX ADMIN — Medication 650 MILLIGRAM(S): at 13:39

## 2024-06-13 RX ADMIN — Medication 650 MILLIGRAM(S): at 05:11

## 2024-06-13 RX ADMIN — Medication 100 MILLIGRAM(S): at 12:38

## 2024-06-13 RX ADMIN — METOCLOPRAMIDE 10 MILLIGRAM(S): 5 SOLUTION ORAL at 13:39

## 2024-06-13 RX ADMIN — Medication 650 MILLIGRAM(S): at 18:16

## 2024-06-13 RX ADMIN — Medication 650 MILLIGRAM(S): at 14:39

## 2024-06-13 RX ADMIN — Medication 1 APPLICATION(S): at 13:49

## 2024-06-13 RX ADMIN — Medication 500 MILLIGRAM(S): at 18:17

## 2024-06-13 RX ADMIN — Medication 100 MILLIGRAM(S): at 05:06

## 2024-06-13 RX ADMIN — Medication 3 UNIT(S)/HR: at 04:29

## 2024-06-13 RX ADMIN — ASPIRIN 81 MILLIGRAM(S): 325 TABLET, FILM COATED ORAL at 13:39

## 2024-06-13 RX ADMIN — Medication 11 MICROGRAM(S)/KG/MIN: at 00:34

## 2024-06-13 RX ADMIN — Medication 500 MILLIGRAM(S): at 05:05

## 2024-06-14 DIAGNOSIS — D69.6 THROMBOCYTOPENIA, UNSPECIFIED: ICD-10-CM

## 2024-06-14 DIAGNOSIS — Z95.2 PRESENCE OF PROSTHETIC HEART VALVE: ICD-10-CM

## 2024-06-14 LAB
ALBUMIN SERPL ELPH-MCNC: 4 G/DL — SIGNIFICANT CHANGE UP (ref 3.3–5)
ALP SERPL-CCNC: 40 U/L — SIGNIFICANT CHANGE UP (ref 40–120)
ALT FLD-CCNC: 33 U/L — SIGNIFICANT CHANGE UP (ref 10–45)
ANION GAP SERPL CALC-SCNC: 9 MMOL/L — SIGNIFICANT CHANGE UP (ref 5–17)
AST SERPL-CCNC: 116 U/L — HIGH (ref 10–40)
BILIRUB SERPL-MCNC: 1.9 MG/DL — HIGH (ref 0.2–1.2)
BUN SERPL-MCNC: 14 MG/DL — SIGNIFICANT CHANGE UP (ref 7–23)
CALCIUM SERPL-MCNC: 9.1 MG/DL — SIGNIFICANT CHANGE UP (ref 8.4–10.5)
CHLORIDE SERPL-SCNC: 104 MMOL/L — SIGNIFICANT CHANGE UP (ref 96–108)
CO2 SERPL-SCNC: 27 MMOL/L — SIGNIFICANT CHANGE UP (ref 22–31)
CREAT SERPL-MCNC: 0.98 MG/DL — SIGNIFICANT CHANGE UP (ref 0.5–1.3)
EGFR: 87 ML/MIN/1.73M2 — SIGNIFICANT CHANGE UP
GLUCOSE BLDC GLUCOMTR-MCNC: 114 MG/DL — HIGH (ref 70–99)
GLUCOSE BLDC GLUCOMTR-MCNC: 119 MG/DL — HIGH (ref 70–99)
GLUCOSE BLDC GLUCOMTR-MCNC: 125 MG/DL — HIGH (ref 70–99)
GLUCOSE BLDC GLUCOMTR-MCNC: 129 MG/DL — HIGH (ref 70–99)
GLUCOSE SERPL-MCNC: 140 MG/DL — HIGH (ref 70–99)
HCT VFR BLD CALC: 26.2 % — LOW (ref 39–50)
HEPARIN-PF4 AB RESULT: <0.6 U/ML — SIGNIFICANT CHANGE UP (ref 0–0.9)
HGB BLD-MCNC: 8.6 G/DL — LOW (ref 13–17)
MAGNESIUM SERPL-MCNC: 2.4 MG/DL — SIGNIFICANT CHANGE UP (ref 1.6–2.6)
MCHC RBC-ENTMCNC: 27.6 PG — SIGNIFICANT CHANGE UP (ref 27–34)
MCHC RBC-ENTMCNC: 32.8 GM/DL — SIGNIFICANT CHANGE UP (ref 32–36)
MCV RBC AUTO: 84 FL — SIGNIFICANT CHANGE UP (ref 80–100)
NRBC # BLD: 0 /100 WBCS — SIGNIFICANT CHANGE UP (ref 0–0)
PF4 HEPARIN CMPLX AB SER-ACNC: NEGATIVE — SIGNIFICANT CHANGE UP
PHOSPHATE SERPL-MCNC: 2.5 MG/DL — SIGNIFICANT CHANGE UP (ref 2.5–4.5)
PLATELET # BLD AUTO: 58 K/UL — LOW (ref 150–400)
POTASSIUM SERPL-MCNC: 4.1 MMOL/L — SIGNIFICANT CHANGE UP (ref 3.5–5.3)
POTASSIUM SERPL-SCNC: 4.1 MMOL/L — SIGNIFICANT CHANGE UP (ref 3.5–5.3)
PROT SERPL-MCNC: 6.4 G/DL — SIGNIFICANT CHANGE UP (ref 6–8.3)
RBC # BLD: 3.12 M/UL — LOW (ref 4.2–5.8)
RBC # FLD: 15 % — HIGH (ref 10.3–14.5)
SODIUM SERPL-SCNC: 140 MMOL/L — SIGNIFICANT CHANGE UP (ref 135–145)
WBC # BLD: 9.62 K/UL — SIGNIFICANT CHANGE UP (ref 3.8–10.5)
WBC # FLD AUTO: 9.62 K/UL — SIGNIFICANT CHANGE UP (ref 3.8–10.5)

## 2024-06-14 PROCEDURE — 71045 X-RAY EXAM CHEST 1 VIEW: CPT | Mod: 26

## 2024-06-14 RX ORDER — ATORVASTATIN CALCIUM 20 MG/1
20 TABLET, FILM COATED ORAL AT BEDTIME
Refills: 0 | Status: DISCONTINUED | OUTPATIENT
Start: 2024-06-14 | End: 2024-06-19

## 2024-06-14 RX ORDER — FUROSEMIDE 10 MG/ML
40 INJECTION, SOLUTION INTRAMUSCULAR; INTRAVENOUS DAILY
Refills: 0 | Status: DISCONTINUED | OUTPATIENT
Start: 2024-06-14 | End: 2024-06-16

## 2024-06-14 RX ORDER — TAMSULOSIN HYDROCHLORIDE 0.4 MG/1
0.4 CAPSULE ORAL AT BEDTIME
Refills: 0 | Status: DISCONTINUED | OUTPATIENT
Start: 2024-06-14 | End: 2024-06-19

## 2024-06-14 RX ORDER — ALBUMIN HUMAN 5 %
250 INTRAVENOUS SOLUTION INTRAVENOUS ONCE
Refills: 0 | Status: COMPLETED | OUTPATIENT
Start: 2024-06-14 | End: 2024-06-14

## 2024-06-14 RX ORDER — AMIODARONE HYDROCHLORIDE 50 MG/ML
200 INJECTION, SOLUTION INTRAVENOUS DAILY
Refills: 0 | Status: DISCONTINUED | OUTPATIENT
Start: 2024-06-14 | End: 2024-06-19

## 2024-06-14 RX ORDER — SPIRONOLACTONE 25 MG/1
25 TABLET ORAL DAILY
Refills: 0 | Status: DISCONTINUED | OUTPATIENT
Start: 2024-06-14 | End: 2024-06-16

## 2024-06-14 RX ORDER — FUROSEMIDE 10 MG/ML
40 INJECTION, SOLUTION INTRAMUSCULAR; INTRAVENOUS ONCE
Refills: 0 | Status: COMPLETED | OUTPATIENT
Start: 2024-06-14 | End: 2024-06-14

## 2024-06-14 RX ORDER — METOPROLOL TARTRATE 50 MG
12.5 TABLET ORAL
Refills: 0 | Status: DISCONTINUED | OUTPATIENT
Start: 2024-06-14 | End: 2024-06-16

## 2024-06-14 RX ORDER — SODIUM CHLORIDE 0.9 % (FLUSH) 0.9 %
3 SYRINGE (ML) INJECTION EVERY 8 HOURS
Refills: 0 | Status: DISCONTINUED | OUTPATIENT
Start: 2024-06-14 | End: 2024-06-19

## 2024-06-14 RX ADMIN — Medication 650 MILLIGRAM(S): at 05:52

## 2024-06-14 RX ADMIN — POLYETHYLENE GLYCOL 3350 17 GRAM(S): 1 POWDER ORAL at 12:23

## 2024-06-14 RX ADMIN — Medication 650 MILLIGRAM(S): at 17:18

## 2024-06-14 RX ADMIN — Medication 500 MILLIGRAM(S): at 05:52

## 2024-06-14 RX ADMIN — SPIRONOLACTONE 25 MILLIGRAM(S): 25 TABLET ORAL at 16:21

## 2024-06-14 RX ADMIN — PANTOPRAZOLE SODIUM 40 MILLIGRAM(S): 40 INJECTION, POWDER, FOR SOLUTION INTRAVENOUS at 12:23

## 2024-06-14 RX ADMIN — Medication 12.5 MILLIGRAM(S): at 17:19

## 2024-06-14 RX ADMIN — Medication 1 APPLICATION(S): at 13:44

## 2024-06-14 RX ADMIN — METOCLOPRAMIDE 10 MILLIGRAM(S): 5 SOLUTION ORAL at 05:52

## 2024-06-14 RX ADMIN — Medication 100 MILLIGRAM(S): at 22:25

## 2024-06-14 RX ADMIN — TAMSULOSIN HYDROCHLORIDE 0.4 MILLIGRAM(S): 0.4 CAPSULE ORAL at 04:19

## 2024-06-14 RX ADMIN — Medication 3 MILLILITER(S): at 13:34

## 2024-06-14 RX ADMIN — FUROSEMIDE 40 MILLIGRAM(S): 10 INJECTION, SOLUTION INTRAMUSCULAR; INTRAVENOUS at 04:19

## 2024-06-14 RX ADMIN — Medication 100 MILLIGRAM(S): at 03:11

## 2024-06-14 RX ADMIN — Medication 100 MILLIGRAM(S): at 05:52

## 2024-06-14 RX ADMIN — Medication 650 MILLIGRAM(S): at 06:22

## 2024-06-14 RX ADMIN — Medication 650 MILLIGRAM(S): at 12:23

## 2024-06-14 RX ADMIN — Medication 100 MILLIGRAM(S): at 13:31

## 2024-06-14 RX ADMIN — TAMSULOSIN HYDROCHLORIDE 0.4 MILLIGRAM(S): 0.4 CAPSULE ORAL at 22:24

## 2024-06-14 RX ADMIN — ASPIRIN 81 MILLIGRAM(S): 325 TABLET, FILM COATED ORAL at 12:23

## 2024-06-14 RX ADMIN — Medication 125 MILLILITER(S): at 09:01

## 2024-06-14 RX ADMIN — AMIODARONE HYDROCHLORIDE 200 MILLIGRAM(S): 50 INJECTION, SOLUTION INTRAVENOUS at 12:23

## 2024-06-14 RX ADMIN — METOCLOPRAMIDE 10 MILLIGRAM(S): 5 SOLUTION ORAL at 13:31

## 2024-06-14 RX ADMIN — Medication 500 MILLIGRAM(S): at 17:18

## 2024-06-14 RX ADMIN — ATORVASTATIN CALCIUM 20 MILLIGRAM(S): 20 TABLET, FILM COATED ORAL at 22:24

## 2024-06-14 RX ADMIN — Medication 2 TABLET(S): at 22:24

## 2024-06-14 RX ADMIN — Medication 3 MILLILITER(S): at 22:00

## 2024-06-15 LAB
ANION GAP SERPL CALC-SCNC: 9 MMOL/L — SIGNIFICANT CHANGE UP (ref 5–17)
BASOPHILS # BLD AUTO: 0.01 K/UL — SIGNIFICANT CHANGE UP (ref 0–0.2)
BASOPHILS NFR BLD AUTO: 0.1 % — SIGNIFICANT CHANGE UP (ref 0–2)
BUN SERPL-MCNC: 18 MG/DL — SIGNIFICANT CHANGE UP (ref 7–23)
CALCIUM SERPL-MCNC: 8.9 MG/DL — SIGNIFICANT CHANGE UP (ref 8.4–10.5)
CHLORIDE SERPL-SCNC: 101 MMOL/L — SIGNIFICANT CHANGE UP (ref 96–108)
CO2 SERPL-SCNC: 29 MMOL/L — SIGNIFICANT CHANGE UP (ref 22–31)
CREAT SERPL-MCNC: 1.12 MG/DL — SIGNIFICANT CHANGE UP (ref 0.5–1.3)
EGFR: 74 ML/MIN/1.73M2 — SIGNIFICANT CHANGE UP
EOSINOPHIL # BLD AUTO: 0.08 K/UL — SIGNIFICANT CHANGE UP (ref 0–0.5)
EOSINOPHIL NFR BLD AUTO: 1 % — SIGNIFICANT CHANGE UP (ref 0–6)
GLUCOSE BLDC GLUCOMTR-MCNC: 116 MG/DL — HIGH (ref 70–99)
GLUCOSE BLDC GLUCOMTR-MCNC: 125 MG/DL — HIGH (ref 70–99)
GLUCOSE BLDC GLUCOMTR-MCNC: 148 MG/DL — HIGH (ref 70–99)
GLUCOSE BLDC GLUCOMTR-MCNC: 148 MG/DL — HIGH (ref 70–99)
GLUCOSE SERPL-MCNC: 98 MG/DL — SIGNIFICANT CHANGE UP (ref 70–99)
HCT VFR BLD CALC: 25.1 % — LOW (ref 39–50)
HGB BLD-MCNC: 8.2 G/DL — LOW (ref 13–17)
IMM GRANULOCYTES NFR BLD AUTO: 0.4 % — SIGNIFICANT CHANGE UP (ref 0–0.9)
LYMPHOCYTES # BLD AUTO: 1.75 K/UL — SIGNIFICANT CHANGE UP (ref 1–3.3)
LYMPHOCYTES # BLD AUTO: 21.5 % — SIGNIFICANT CHANGE UP (ref 13–44)
MAGNESIUM SERPL-MCNC: 2.1 MG/DL — SIGNIFICANT CHANGE UP (ref 1.6–2.6)
MCHC RBC-ENTMCNC: 28.2 PG — SIGNIFICANT CHANGE UP (ref 27–34)
MCHC RBC-ENTMCNC: 32.7 GM/DL — SIGNIFICANT CHANGE UP (ref 32–36)
MCV RBC AUTO: 86.3 FL — SIGNIFICANT CHANGE UP (ref 80–100)
MONOCYTES # BLD AUTO: 0.81 K/UL — SIGNIFICANT CHANGE UP (ref 0–0.9)
MONOCYTES NFR BLD AUTO: 10 % — SIGNIFICANT CHANGE UP (ref 2–14)
NEUTROPHILS # BLD AUTO: 5.45 K/UL — SIGNIFICANT CHANGE UP (ref 1.8–7.4)
NEUTROPHILS NFR BLD AUTO: 67 % — SIGNIFICANT CHANGE UP (ref 43–77)
NRBC # BLD: 0 /100 WBCS — SIGNIFICANT CHANGE UP (ref 0–0)
PHOSPHATE SERPL-MCNC: 2.3 MG/DL — LOW (ref 2.5–4.5)
PLATELET # BLD AUTO: 61 K/UL — LOW (ref 150–400)
POTASSIUM SERPL-MCNC: 4.4 MMOL/L — SIGNIFICANT CHANGE UP (ref 3.5–5.3)
POTASSIUM SERPL-SCNC: 4.4 MMOL/L — SIGNIFICANT CHANGE UP (ref 3.5–5.3)
RBC # BLD: 2.91 M/UL — LOW (ref 4.2–5.8)
RBC # FLD: 15.1 % — HIGH (ref 10.3–14.5)
SODIUM SERPL-SCNC: 139 MMOL/L — SIGNIFICANT CHANGE UP (ref 135–145)
WBC # BLD: 8.13 K/UL — SIGNIFICANT CHANGE UP (ref 3.8–10.5)
WBC # FLD AUTO: 8.13 K/UL — SIGNIFICANT CHANGE UP (ref 3.8–10.5)

## 2024-06-15 PROCEDURE — 71045 X-RAY EXAM CHEST 1 VIEW: CPT | Mod: 26

## 2024-06-15 RX ORDER — BISACODYL 5 MG
10 TABLET, DELAYED RELEASE (ENTERIC COATED) ORAL ONCE
Refills: 0 | Status: COMPLETED | OUTPATIENT
Start: 2024-06-15 | End: 2024-06-15

## 2024-06-15 RX ADMIN — Medication 12.5 MILLIGRAM(S): at 05:10

## 2024-06-15 RX ADMIN — SPIRONOLACTONE 25 MILLIGRAM(S): 25 TABLET ORAL at 05:10

## 2024-06-15 RX ADMIN — AMIODARONE HYDROCHLORIDE 200 MILLIGRAM(S): 50 INJECTION, SOLUTION INTRAVENOUS at 05:10

## 2024-06-15 RX ADMIN — Medication 500 MILLIGRAM(S): at 17:28

## 2024-06-15 RX ADMIN — Medication 500 MILLIGRAM(S): at 05:10

## 2024-06-15 RX ADMIN — ATORVASTATIN CALCIUM 20 MILLIGRAM(S): 20 TABLET, FILM COATED ORAL at 21:23

## 2024-06-15 RX ADMIN — Medication 3 MILLILITER(S): at 21:34

## 2024-06-15 RX ADMIN — Medication 10 MILLIGRAM(S): at 22:35

## 2024-06-15 RX ADMIN — Medication 650 MILLIGRAM(S): at 05:40

## 2024-06-15 RX ADMIN — Medication 650 MILLIGRAM(S): at 00:59

## 2024-06-15 RX ADMIN — Medication 100 MILLIGRAM(S): at 21:23

## 2024-06-15 RX ADMIN — FUROSEMIDE 40 MILLIGRAM(S): 10 INJECTION, SOLUTION INTRAMUSCULAR; INTRAVENOUS at 05:10

## 2024-06-15 RX ADMIN — Medication 650 MILLIGRAM(S): at 02:18

## 2024-06-15 RX ADMIN — Medication 2 TABLET(S): at 21:23

## 2024-06-15 RX ADMIN — Medication 650 MILLIGRAM(S): at 05:10

## 2024-06-15 RX ADMIN — PANTOPRAZOLE SODIUM 40 MILLIGRAM(S): 40 INJECTION, POWDER, FOR SOLUTION INTRAVENOUS at 11:58

## 2024-06-15 RX ADMIN — Medication 12.5 MILLIGRAM(S): at 21:24

## 2024-06-15 RX ADMIN — Medication 3 MILLILITER(S): at 15:00

## 2024-06-15 RX ADMIN — TAMSULOSIN HYDROCHLORIDE 0.4 MILLIGRAM(S): 0.4 CAPSULE ORAL at 21:24

## 2024-06-15 RX ADMIN — Medication 650 MILLIGRAM(S): at 11:59

## 2024-06-15 RX ADMIN — Medication 3 MILLILITER(S): at 06:21

## 2024-06-15 RX ADMIN — Medication 1 APPLICATION(S): at 11:23

## 2024-06-15 RX ADMIN — ASPIRIN 81 MILLIGRAM(S): 325 TABLET, FILM COATED ORAL at 11:59

## 2024-06-15 RX ADMIN — Medication 100 MILLIGRAM(S): at 05:10

## 2024-06-15 RX ADMIN — Medication 650 MILLIGRAM(S): at 12:00

## 2024-06-15 RX ADMIN — POLYETHYLENE GLYCOL 3350 17 GRAM(S): 1 POWDER ORAL at 11:59

## 2024-06-16 ENCOUNTER — RESULT REVIEW (OUTPATIENT)
Age: 64
End: 2024-06-16

## 2024-06-16 LAB
ALBUMIN SERPL ELPH-MCNC: 3.7 G/DL — SIGNIFICANT CHANGE UP (ref 3.3–5)
ALP SERPL-CCNC: 46 U/L — SIGNIFICANT CHANGE UP (ref 40–120)
ALT FLD-CCNC: 32 U/L — SIGNIFICANT CHANGE UP (ref 10–45)
AMMONIA BLD-MCNC: 22 UMOL/L — SIGNIFICANT CHANGE UP (ref 11–55)
ANION GAP SERPL CALC-SCNC: 11 MMOL/L — SIGNIFICANT CHANGE UP (ref 5–17)
ANION GAP SERPL CALC-SCNC: 13 MMOL/L — SIGNIFICANT CHANGE UP (ref 5–17)
APPEARANCE UR: ABNORMAL
AST SERPL-CCNC: 55 U/L — HIGH (ref 10–40)
BACTERIA # UR AUTO: NEGATIVE /HPF — SIGNIFICANT CHANGE UP
BASE EXCESS BLDA CALC-SCNC: 5.4 MMOL/L — HIGH (ref -2–3)
BASOPHILS # BLD AUTO: 0.02 K/UL — SIGNIFICANT CHANGE UP (ref 0–0.2)
BASOPHILS NFR BLD AUTO: 0.3 % — SIGNIFICANT CHANGE UP (ref 0–2)
BILIRUB SERPL-MCNC: 0.9 MG/DL — SIGNIFICANT CHANGE UP (ref 0.2–1.2)
BILIRUB UR-MCNC: NEGATIVE — SIGNIFICANT CHANGE UP
BLD GP AB SCN SERPL QL: NEGATIVE — SIGNIFICANT CHANGE UP
BUN SERPL-MCNC: 16 MG/DL — SIGNIFICANT CHANGE UP (ref 7–23)
BUN SERPL-MCNC: 18 MG/DL — SIGNIFICANT CHANGE UP (ref 7–23)
CALCIUM SERPL-MCNC: 8.9 MG/DL — SIGNIFICANT CHANGE UP (ref 8.4–10.5)
CALCIUM SERPL-MCNC: 9.4 MG/DL — SIGNIFICANT CHANGE UP (ref 8.4–10.5)
CAST: 0 /LPF — SIGNIFICANT CHANGE UP (ref 0–4)
CHLORIDE SERPL-SCNC: 101 MMOL/L — SIGNIFICANT CHANGE UP (ref 96–108)
CHLORIDE SERPL-SCNC: 98 MMOL/L — SIGNIFICANT CHANGE UP (ref 96–108)
CK SERPL-CCNC: 593 U/L — HIGH (ref 30–200)
CO2 BLDA-SCNC: 30 MMOL/L — HIGH (ref 19–24)
CO2 SERPL-SCNC: 26 MMOL/L — SIGNIFICANT CHANGE UP (ref 22–31)
CO2 SERPL-SCNC: 27 MMOL/L — SIGNIFICANT CHANGE UP (ref 22–31)
COLOR SPEC: YELLOW — SIGNIFICANT CHANGE UP
CREAT SERPL-MCNC: 1.02 MG/DL — SIGNIFICANT CHANGE UP (ref 0.5–1.3)
CREAT SERPL-MCNC: 1.1 MG/DL — SIGNIFICANT CHANGE UP (ref 0.5–1.3)
DIFF PNL FLD: NEGATIVE — SIGNIFICANT CHANGE UP
EGFR: 75 ML/MIN/1.73M2 — SIGNIFICANT CHANGE UP
EGFR: 83 ML/MIN/1.73M2 — SIGNIFICANT CHANGE UP
EOSINOPHIL # BLD AUTO: 0.09 K/UL — SIGNIFICANT CHANGE UP (ref 0–0.5)
EOSINOPHIL NFR BLD AUTO: 1.2 % — SIGNIFICANT CHANGE UP (ref 0–6)
GAS PNL BLDA: SIGNIFICANT CHANGE UP
GLUCOSE BLDC GLUCOMTR-MCNC: 112 MG/DL — HIGH (ref 70–99)
GLUCOSE BLDC GLUCOMTR-MCNC: 128 MG/DL — HIGH (ref 70–99)
GLUCOSE BLDC GLUCOMTR-MCNC: 99 MG/DL — SIGNIFICANT CHANGE UP (ref 70–99)
GLUCOSE SERPL-MCNC: 112 MG/DL — HIGH (ref 70–99)
GLUCOSE SERPL-MCNC: 91 MG/DL — SIGNIFICANT CHANGE UP (ref 70–99)
GLUCOSE UR QL: NEGATIVE MG/DL — SIGNIFICANT CHANGE UP
HCO3 BLDA-SCNC: 29 MMOL/L — HIGH (ref 21–28)
HCT VFR BLD CALC: 23.5 % — LOW (ref 39–50)
HCT VFR BLD CALC: 28.7 % — LOW (ref 39–50)
HGB BLD-MCNC: 7.8 G/DL — LOW (ref 13–17)
HGB BLD-MCNC: 9 G/DL — LOW (ref 13–17)
IMM GRANULOCYTES NFR BLD AUTO: 0.6 % — SIGNIFICANT CHANGE UP (ref 0–0.9)
KETONES UR-MCNC: ABNORMAL MG/DL
LEUKOCYTE ESTERASE UR-ACNC: NEGATIVE — SIGNIFICANT CHANGE UP
LYMPHOCYTES # BLD AUTO: 1.4 K/UL — SIGNIFICANT CHANGE UP (ref 1–3.3)
LYMPHOCYTES # BLD AUTO: 19.3 % — SIGNIFICANT CHANGE UP (ref 13–44)
MAGNESIUM SERPL-MCNC: 2 MG/DL — SIGNIFICANT CHANGE UP (ref 1.6–2.6)
MAGNESIUM SERPL-MCNC: 2.1 MG/DL — SIGNIFICANT CHANGE UP (ref 1.6–2.6)
MCHC RBC-ENTMCNC: 26.8 PG — LOW (ref 27–34)
MCHC RBC-ENTMCNC: 27.7 PG — SIGNIFICANT CHANGE UP (ref 27–34)
MCHC RBC-ENTMCNC: 31.4 GM/DL — LOW (ref 32–36)
MCHC RBC-ENTMCNC: 33.2 GM/DL — SIGNIFICANT CHANGE UP (ref 32–36)
MCV RBC AUTO: 83.3 FL — SIGNIFICANT CHANGE UP (ref 80–100)
MCV RBC AUTO: 85.4 FL — SIGNIFICANT CHANGE UP (ref 80–100)
MONOCYTES # BLD AUTO: 0.66 K/UL — SIGNIFICANT CHANGE UP (ref 0–0.9)
MONOCYTES NFR BLD AUTO: 9.1 % — SIGNIFICANT CHANGE UP (ref 2–14)
NEUTROPHILS # BLD AUTO: 5.05 K/UL — SIGNIFICANT CHANGE UP (ref 1.8–7.4)
NEUTROPHILS NFR BLD AUTO: 69.5 % — SIGNIFICANT CHANGE UP (ref 43–77)
NITRITE UR-MCNC: NEGATIVE — SIGNIFICANT CHANGE UP
NRBC # BLD: 0 /100 WBCS — SIGNIFICANT CHANGE UP (ref 0–0)
NRBC # BLD: 0 /100 WBCS — SIGNIFICANT CHANGE UP (ref 0–0)
PCO2 BLDA: 38 MMHG — SIGNIFICANT CHANGE UP (ref 35–48)
PH BLDA: 7.49 — HIGH (ref 7.35–7.45)
PH UR: 8.5 (ref 5–8)
PHOSPHATE SERPL-MCNC: 2.4 MG/DL — LOW (ref 2.5–4.5)
PHOSPHATE SERPL-MCNC: 2.7 MG/DL — SIGNIFICANT CHANGE UP (ref 2.5–4.5)
PLATELET # BLD AUTO: 80 K/UL — LOW (ref 150–400)
PLATELET # BLD AUTO: 96 K/UL — LOW (ref 150–400)
PO2 BLDA: 122 MMHG — HIGH (ref 83–108)
POTASSIUM SERPL-MCNC: 3.7 MMOL/L — SIGNIFICANT CHANGE UP (ref 3.5–5.3)
POTASSIUM SERPL-MCNC: 3.9 MMOL/L — SIGNIFICANT CHANGE UP (ref 3.5–5.3)
POTASSIUM SERPL-SCNC: 3.7 MMOL/L — SIGNIFICANT CHANGE UP (ref 3.5–5.3)
POTASSIUM SERPL-SCNC: 3.9 MMOL/L — SIGNIFICANT CHANGE UP (ref 3.5–5.3)
PROT SERPL-MCNC: 6.4 G/DL — SIGNIFICANT CHANGE UP (ref 6–8.3)
PROT UR-MCNC: 30 MG/DL
RBC # BLD: 2.82 M/UL — LOW (ref 4.2–5.8)
RBC # BLD: 3.36 M/UL — LOW (ref 4.2–5.8)
RBC # FLD: 14.7 % — HIGH (ref 10.3–14.5)
RBC # FLD: 15.1 % — HIGH (ref 10.3–14.5)
RBC CASTS # UR COMP ASSIST: 1 /HPF — SIGNIFICANT CHANGE UP (ref 0–4)
RH IG SCN BLD-IMP: POSITIVE — SIGNIFICANT CHANGE UP
SAO2 % BLDA: 99.1 % — HIGH (ref 94–98)
SODIUM SERPL-SCNC: 136 MMOL/L — SIGNIFICANT CHANGE UP (ref 135–145)
SODIUM SERPL-SCNC: 140 MMOL/L — SIGNIFICANT CHANGE UP (ref 135–145)
SP GR SPEC: 1.02 — SIGNIFICANT CHANGE UP (ref 1–1.03)
SQUAMOUS # UR AUTO: 0 /HPF — SIGNIFICANT CHANGE UP (ref 0–5)
UROBILINOGEN FLD QL: 2 MG/DL (ref 0.2–1)
WBC # BLD: 7.26 K/UL — SIGNIFICANT CHANGE UP (ref 3.8–10.5)
WBC # BLD: 7.47 K/UL — SIGNIFICANT CHANGE UP (ref 3.8–10.5)
WBC # FLD AUTO: 7.26 K/UL — SIGNIFICANT CHANGE UP (ref 3.8–10.5)
WBC # FLD AUTO: 7.47 K/UL — SIGNIFICANT CHANGE UP (ref 3.8–10.5)
WBC UR QL: 0 /HPF — SIGNIFICANT CHANGE UP (ref 0–5)

## 2024-06-16 PROCEDURE — 71045 X-RAY EXAM CHEST 1 VIEW: CPT | Mod: 26

## 2024-06-16 PROCEDURE — 99222 1ST HOSP IP/OBS MODERATE 55: CPT

## 2024-06-16 PROCEDURE — 93325 DOPPLER ECHO COLOR FLOW MAPG: CPT | Mod: 26,59

## 2024-06-16 PROCEDURE — 93306 TTE W/DOPPLER COMPLETE: CPT | Mod: 26

## 2024-06-16 PROCEDURE — 93308 TTE F-UP OR LMTD: CPT | Mod: 26,59

## 2024-06-16 RX ORDER — ACETAMINOPHEN 325 MG
650 TABLET ORAL EVERY 6 HOURS
Refills: 0 | Status: DISCONTINUED | OUTPATIENT
Start: 2024-06-16 | End: 2024-06-19

## 2024-06-16 RX ORDER — POTASSIUM CHLORIDE 600 MG/1
20 TABLET, FILM COATED, EXTENDED RELEASE ORAL ONCE
Refills: 0 | Status: COMPLETED | OUTPATIENT
Start: 2024-06-16 | End: 2024-06-16

## 2024-06-16 RX ORDER — SOD PHOS DI, MONO/K PHOS MONO 250 MG
2 TABLET ORAL ONCE
Refills: 0 | Status: COMPLETED | OUTPATIENT
Start: 2024-06-16 | End: 2024-06-16

## 2024-06-16 RX ADMIN — AMIODARONE HYDROCHLORIDE 200 MILLIGRAM(S): 50 INJECTION, SOLUTION INTRAVENOUS at 04:41

## 2024-06-16 RX ADMIN — Medication 30 MILLILITER(S): at 14:13

## 2024-06-16 RX ADMIN — Medication 500 MILLIGRAM(S): at 16:35

## 2024-06-16 RX ADMIN — ASPIRIN 81 MILLIGRAM(S): 325 TABLET, FILM COATED ORAL at 12:35

## 2024-06-16 RX ADMIN — ATORVASTATIN CALCIUM 20 MILLIGRAM(S): 20 TABLET, FILM COATED ORAL at 21:04

## 2024-06-16 RX ADMIN — Medication 650 MILLIGRAM(S): at 21:49

## 2024-06-16 RX ADMIN — Medication 1 APPLICATION(S): at 12:33

## 2024-06-16 RX ADMIN — PANTOPRAZOLE SODIUM 40 MILLIGRAM(S): 40 INJECTION, POWDER, FOR SOLUTION INTRAVENOUS at 12:35

## 2024-06-16 RX ADMIN — Medication 3 MILLILITER(S): at 22:00

## 2024-06-16 RX ADMIN — Medication 500 MILLIGRAM(S): at 04:41

## 2024-06-16 RX ADMIN — POTASSIUM CHLORIDE 20 MILLIEQUIVALENT(S): 600 TABLET, FILM COATED, EXTENDED RELEASE ORAL at 04:41

## 2024-06-16 RX ADMIN — POLYETHYLENE GLYCOL 3350 17 GRAM(S): 1 POWDER ORAL at 12:35

## 2024-06-16 RX ADMIN — Medication 3 MILLILITER(S): at 04:21

## 2024-06-16 RX ADMIN — TAMSULOSIN HYDROCHLORIDE 0.4 MILLIGRAM(S): 0.4 CAPSULE ORAL at 21:04

## 2024-06-16 RX ADMIN — Medication 650 MILLIGRAM(S): at 21:18

## 2024-06-16 RX ADMIN — Medication 2 PACKET(S): at 05:47

## 2024-06-16 RX ADMIN — Medication 3 MILLILITER(S): at 16:34

## 2024-06-17 LAB
ANION GAP SERPL CALC-SCNC: 15 MMOL/L — SIGNIFICANT CHANGE UP (ref 5–17)
BASOPHILS # BLD AUTO: 0.02 K/UL — SIGNIFICANT CHANGE UP (ref 0–0.2)
BASOPHILS NFR BLD AUTO: 0.3 % — SIGNIFICANT CHANGE UP (ref 0–2)
BUN SERPL-MCNC: 14 MG/DL — SIGNIFICANT CHANGE UP (ref 7–23)
CALCIUM SERPL-MCNC: 9.1 MG/DL — SIGNIFICANT CHANGE UP (ref 8.4–10.5)
CHLORIDE SERPL-SCNC: 101 MMOL/L — SIGNIFICANT CHANGE UP (ref 96–108)
CO2 SERPL-SCNC: 24 MMOL/L — SIGNIFICANT CHANGE UP (ref 22–31)
CREAT SERPL-MCNC: 1.02 MG/DL — SIGNIFICANT CHANGE UP (ref 0.5–1.3)
EGFR: 83 ML/MIN/1.73M2 — SIGNIFICANT CHANGE UP
EOSINOPHIL # BLD AUTO: 0.27 K/UL — SIGNIFICANT CHANGE UP (ref 0–0.5)
EOSINOPHIL NFR BLD AUTO: 3.5 % — SIGNIFICANT CHANGE UP (ref 0–6)
GLUCOSE BLDC GLUCOMTR-MCNC: 109 MG/DL — HIGH (ref 70–99)
GLUCOSE SERPL-MCNC: 88 MG/DL — SIGNIFICANT CHANGE UP (ref 70–99)
HCT VFR BLD CALC: 26.3 % — LOW (ref 39–50)
HGB BLD-MCNC: 8.7 G/DL — LOW (ref 13–17)
IMM GRANULOCYTES NFR BLD AUTO: 0.5 % — SIGNIFICANT CHANGE UP (ref 0–0.9)
LYMPHOCYTES # BLD AUTO: 1.67 K/UL — SIGNIFICANT CHANGE UP (ref 1–3.3)
LYMPHOCYTES # BLD AUTO: 21.7 % — SIGNIFICANT CHANGE UP (ref 13–44)
MAGNESIUM SERPL-MCNC: 2 MG/DL — SIGNIFICANT CHANGE UP (ref 1.6–2.6)
MCHC RBC-ENTMCNC: 27.7 PG — SIGNIFICANT CHANGE UP (ref 27–34)
MCHC RBC-ENTMCNC: 33.1 GM/DL — SIGNIFICANT CHANGE UP (ref 32–36)
MCV RBC AUTO: 83.8 FL — SIGNIFICANT CHANGE UP (ref 80–100)
MONOCYTES # BLD AUTO: 0.84 K/UL — SIGNIFICANT CHANGE UP (ref 0–0.9)
MONOCYTES NFR BLD AUTO: 10.9 % — SIGNIFICANT CHANGE UP (ref 2–14)
NEUTROPHILS # BLD AUTO: 4.87 K/UL — SIGNIFICANT CHANGE UP (ref 1.8–7.4)
NEUTROPHILS NFR BLD AUTO: 63.1 % — SIGNIFICANT CHANGE UP (ref 43–77)
NRBC # BLD: 0 /100 WBCS — SIGNIFICANT CHANGE UP (ref 0–0)
PHOSPHATE SERPL-MCNC: 2.9 MG/DL — SIGNIFICANT CHANGE UP (ref 2.5–4.5)
PLATELET # BLD AUTO: 114 K/UL — LOW (ref 150–400)
POTASSIUM SERPL-MCNC: 4 MMOL/L — SIGNIFICANT CHANGE UP (ref 3.5–5.3)
POTASSIUM SERPL-SCNC: 4 MMOL/L — SIGNIFICANT CHANGE UP (ref 3.5–5.3)
RBC # BLD: 3.14 M/UL — LOW (ref 4.2–5.8)
RBC # FLD: 15.1 % — HIGH (ref 10.3–14.5)
SODIUM SERPL-SCNC: 140 MMOL/L — SIGNIFICANT CHANGE UP (ref 135–145)
WBC # BLD: 7.71 K/UL — SIGNIFICANT CHANGE UP (ref 3.8–10.5)
WBC # FLD AUTO: 7.71 K/UL — SIGNIFICANT CHANGE UP (ref 3.8–10.5)

## 2024-06-17 RX ORDER — WARFARIN SODIUM 4 MG/1
3 TABLET ORAL ONCE
Refills: 0 | Status: COMPLETED | OUTPATIENT
Start: 2024-06-17 | End: 2024-06-17

## 2024-06-17 RX ADMIN — WARFARIN SODIUM 3 MILLIGRAM(S): 4 TABLET ORAL at 21:28

## 2024-06-17 RX ADMIN — Medication 2 TABLET(S): at 21:28

## 2024-06-17 RX ADMIN — TAMSULOSIN HYDROCHLORIDE 0.4 MILLIGRAM(S): 0.4 CAPSULE ORAL at 21:28

## 2024-06-17 RX ADMIN — Medication 1 APPLICATION(S): at 12:09

## 2024-06-17 RX ADMIN — POLYETHYLENE GLYCOL 3350 17 GRAM(S): 1 POWDER ORAL at 09:01

## 2024-06-17 RX ADMIN — Medication 3 MILLILITER(S): at 22:05

## 2024-06-17 RX ADMIN — Medication 3 MILLILITER(S): at 15:15

## 2024-06-17 RX ADMIN — ATORVASTATIN CALCIUM 20 MILLIGRAM(S): 20 TABLET, FILM COATED ORAL at 21:28

## 2024-06-17 RX ADMIN — ASPIRIN 81 MILLIGRAM(S): 325 TABLET, FILM COATED ORAL at 09:01

## 2024-06-17 RX ADMIN — AMIODARONE HYDROCHLORIDE 200 MILLIGRAM(S): 50 INJECTION, SOLUTION INTRAVENOUS at 05:40

## 2024-06-17 RX ADMIN — Medication 500 MILLIGRAM(S): at 05:40

## 2024-06-17 RX ADMIN — Medication 3 MILLILITER(S): at 05:43

## 2024-06-17 RX ADMIN — OXYCODONE HYDROCHLORIDE 5 MILLIGRAM(S): 100 SOLUTION ORAL at 20:15

## 2024-06-17 RX ADMIN — OXYCODONE HYDROCHLORIDE 5 MILLIGRAM(S): 100 SOLUTION ORAL at 19:38

## 2024-06-17 RX ADMIN — PANTOPRAZOLE SODIUM 40 MILLIGRAM(S): 40 INJECTION, POWDER, FOR SOLUTION INTRAVENOUS at 09:02

## 2024-06-18 LAB
ALBUMIN SERPL ELPH-MCNC: 3.3 G/DL — SIGNIFICANT CHANGE UP (ref 3.3–5)
ALP SERPL-CCNC: 56 U/L — SIGNIFICANT CHANGE UP (ref 40–120)
ALT FLD-CCNC: 29 U/L — SIGNIFICANT CHANGE UP (ref 10–45)
ANION GAP SERPL CALC-SCNC: 14 MMOL/L — SIGNIFICANT CHANGE UP (ref 5–17)
APTT BLD: 27.9 SEC — SIGNIFICANT CHANGE UP (ref 24.5–35.6)
AST SERPL-CCNC: 34 U/L — SIGNIFICANT CHANGE UP (ref 10–40)
BASOPHILS # BLD AUTO: 0.04 K/UL — SIGNIFICANT CHANGE UP (ref 0–0.2)
BASOPHILS NFR BLD AUTO: 0.5 % — SIGNIFICANT CHANGE UP (ref 0–2)
BILIRUB SERPL-MCNC: 1.1 MG/DL — SIGNIFICANT CHANGE UP (ref 0.2–1.2)
BUN SERPL-MCNC: 15 MG/DL — SIGNIFICANT CHANGE UP (ref 7–23)
CALCIUM SERPL-MCNC: 9.1 MG/DL — SIGNIFICANT CHANGE UP (ref 8.4–10.5)
CHLORIDE SERPL-SCNC: 100 MMOL/L — SIGNIFICANT CHANGE UP (ref 96–108)
CO2 SERPL-SCNC: 23 MMOL/L — SIGNIFICANT CHANGE UP (ref 22–31)
CREAT SERPL-MCNC: 1.06 MG/DL — SIGNIFICANT CHANGE UP (ref 0.5–1.3)
EGFR: 79 ML/MIN/1.73M2 — SIGNIFICANT CHANGE UP
EOSINOPHIL # BLD AUTO: 0.54 K/UL — HIGH (ref 0–0.5)
EOSINOPHIL NFR BLD AUTO: 6.1 % — HIGH (ref 0–6)
GLUCOSE SERPL-MCNC: 97 MG/DL — SIGNIFICANT CHANGE UP (ref 70–99)
HCT VFR BLD CALC: 25.9 % — LOW (ref 39–50)
HGB BLD-MCNC: 8.7 G/DL — LOW (ref 13–17)
IMM GRANULOCYTES NFR BLD AUTO: 0.8 % — SIGNIFICANT CHANGE UP (ref 0–0.9)
INR BLD: 1.1 RATIO — SIGNIFICANT CHANGE UP (ref 0.85–1.18)
LYMPHOCYTES # BLD AUTO: 1.84 K/UL — SIGNIFICANT CHANGE UP (ref 1–3.3)
LYMPHOCYTES # BLD AUTO: 20.7 % — SIGNIFICANT CHANGE UP (ref 13–44)
MAGNESIUM SERPL-MCNC: 2 MG/DL — SIGNIFICANT CHANGE UP (ref 1.6–2.6)
MCHC RBC-ENTMCNC: 27.7 PG — SIGNIFICANT CHANGE UP (ref 27–34)
MCHC RBC-ENTMCNC: 33.6 GM/DL — SIGNIFICANT CHANGE UP (ref 32–36)
MCV RBC AUTO: 82.5 FL — SIGNIFICANT CHANGE UP (ref 80–100)
MONOCYTES # BLD AUTO: 1.11 K/UL — HIGH (ref 0–0.9)
MONOCYTES NFR BLD AUTO: 12.5 % — SIGNIFICANT CHANGE UP (ref 2–14)
NEUTROPHILS # BLD AUTO: 5.28 K/UL — SIGNIFICANT CHANGE UP (ref 1.8–7.4)
NEUTROPHILS NFR BLD AUTO: 59.4 % — SIGNIFICANT CHANGE UP (ref 43–77)
NRBC # BLD: 0 /100 WBCS — SIGNIFICANT CHANGE UP (ref 0–0)
PHOSPHATE SERPL-MCNC: 3.5 MG/DL — SIGNIFICANT CHANGE UP (ref 2.5–4.5)
PLATELET # BLD AUTO: 144 K/UL — LOW (ref 150–400)
POTASSIUM SERPL-MCNC: 3.9 MMOL/L — SIGNIFICANT CHANGE UP (ref 3.5–5.3)
POTASSIUM SERPL-SCNC: 3.9 MMOL/L — SIGNIFICANT CHANGE UP (ref 3.5–5.3)
PROT SERPL-MCNC: 6.5 G/DL — SIGNIFICANT CHANGE UP (ref 6–8.3)
PROTHROM AB SERPL-ACNC: 12.1 SEC — SIGNIFICANT CHANGE UP (ref 9.5–13)
RBC # BLD: 3.14 M/UL — LOW (ref 4.2–5.8)
RBC # FLD: 14.8 % — HIGH (ref 10.3–14.5)
SODIUM SERPL-SCNC: 137 MMOL/L — SIGNIFICANT CHANGE UP (ref 135–145)
WBC # BLD: 8.88 K/UL — SIGNIFICANT CHANGE UP (ref 3.8–10.5)
WBC # FLD AUTO: 8.88 K/UL — SIGNIFICANT CHANGE UP (ref 3.8–10.5)

## 2024-06-18 RX ORDER — METOPROLOL TARTRATE 50 MG
25 TABLET ORAL EVERY 12 HOURS
Refills: 0 | Status: DISCONTINUED | OUTPATIENT
Start: 2024-06-18 | End: 2024-06-19

## 2024-06-18 RX ORDER — WARFARIN SODIUM 4 MG/1
3 TABLET ORAL ONCE
Refills: 0 | Status: COMPLETED | OUTPATIENT
Start: 2024-06-18 | End: 2024-06-18

## 2024-06-18 RX ADMIN — ATORVASTATIN CALCIUM 20 MILLIGRAM(S): 20 TABLET, FILM COATED ORAL at 21:09

## 2024-06-18 RX ADMIN — TAMSULOSIN HYDROCHLORIDE 0.4 MILLIGRAM(S): 0.4 CAPSULE ORAL at 21:09

## 2024-06-18 RX ADMIN — Medication 3 MILLILITER(S): at 13:14

## 2024-06-18 RX ADMIN — POLYETHYLENE GLYCOL 3350 17 GRAM(S): 1 POWDER ORAL at 08:06

## 2024-06-18 RX ADMIN — Medication 3 MILLILITER(S): at 20:49

## 2024-06-18 RX ADMIN — ASPIRIN 81 MILLIGRAM(S): 325 TABLET, FILM COATED ORAL at 08:06

## 2024-06-18 RX ADMIN — Medication 25 MILLIGRAM(S): at 17:25

## 2024-06-18 RX ADMIN — PANTOPRAZOLE SODIUM 40 MILLIGRAM(S): 40 INJECTION, POWDER, FOR SOLUTION INTRAVENOUS at 08:07

## 2024-06-18 RX ADMIN — Medication 3 MILLILITER(S): at 05:34

## 2024-06-18 RX ADMIN — OXYCODONE HYDROCHLORIDE 5 MILLIGRAM(S): 100 SOLUTION ORAL at 17:10

## 2024-06-18 RX ADMIN — OXYCODONE HYDROCHLORIDE 5 MILLIGRAM(S): 100 SOLUTION ORAL at 16:40

## 2024-06-18 RX ADMIN — Medication 2 TABLET(S): at 21:08

## 2024-06-18 RX ADMIN — AMIODARONE HYDROCHLORIDE 200 MILLIGRAM(S): 50 INJECTION, SOLUTION INTRAVENOUS at 05:37

## 2024-06-18 RX ADMIN — WARFARIN SODIUM 3 MILLIGRAM(S): 4 TABLET ORAL at 21:09

## 2024-06-19 ENCOUNTER — TRANSCRIPTION ENCOUNTER (OUTPATIENT)
Age: 64
End: 2024-06-19

## 2024-06-19 VITALS — WEIGHT: 166.01 LBS

## 2024-06-19 LAB
ANION GAP SERPL CALC-SCNC: 11 MMOL/L — SIGNIFICANT CHANGE UP (ref 5–17)
APTT BLD: 27.7 SEC — SIGNIFICANT CHANGE UP (ref 24.5–35.6)
BUN SERPL-MCNC: 16 MG/DL — SIGNIFICANT CHANGE UP (ref 7–23)
CALCIUM SERPL-MCNC: 9 MG/DL — SIGNIFICANT CHANGE UP (ref 8.4–10.5)
CHLORIDE SERPL-SCNC: 99 MMOL/L — SIGNIFICANT CHANGE UP (ref 96–108)
CO2 SERPL-SCNC: 22 MMOL/L — SIGNIFICANT CHANGE UP (ref 22–31)
CREAT SERPL-MCNC: 1.1 MG/DL — SIGNIFICANT CHANGE UP (ref 0.5–1.3)
EGFR: 75 ML/MIN/1.73M2 — SIGNIFICANT CHANGE UP
GLUCOSE SERPL-MCNC: 92 MG/DL — SIGNIFICANT CHANGE UP (ref 70–99)
HCT VFR BLD CALC: 26.2 % — LOW (ref 39–50)
HGB BLD-MCNC: 8.9 G/DL — LOW (ref 13–17)
HGB BLDA-MCNC: 13 G/DL — SIGNIFICANT CHANGE UP (ref 12.6–17.4)
HGB FLD-MCNC: 12.6 G/DL — SIGNIFICANT CHANGE UP (ref 12.6–17.4)
INR BLD: 1.22 RATIO — HIGH (ref 0.85–1.18)
MCHC RBC-ENTMCNC: 27.9 PG — SIGNIFICANT CHANGE UP (ref 27–34)
MCHC RBC-ENTMCNC: 34 GM/DL — SIGNIFICANT CHANGE UP (ref 32–36)
MCV RBC AUTO: 82.1 FL — SIGNIFICANT CHANGE UP (ref 80–100)
NRBC # BLD: 0 /100 WBCS — SIGNIFICANT CHANGE UP (ref 0–0)
OXYHGB MFR BLDA: 96.2 % — HIGH (ref 90–95)
OXYHGB MFR BLDMV: 65.8 % — LOW (ref 90–95)
PLATELET # BLD AUTO: 179 K/UL — SIGNIFICANT CHANGE UP (ref 150–400)
POTASSIUM SERPL-MCNC: 4.1 MMOL/L — SIGNIFICANT CHANGE UP (ref 3.5–5.3)
POTASSIUM SERPL-SCNC: 4.1 MMOL/L — SIGNIFICANT CHANGE UP (ref 3.5–5.3)
PROTHROM AB SERPL-ACNC: 12.7 SEC — SIGNIFICANT CHANGE UP (ref 9.5–13)
RBC # BLD: 3.19 M/UL — LOW (ref 4.2–5.8)
RBC # FLD: 14.6 % — HIGH (ref 10.3–14.5)
SAO2 % BLD: 67.1 % — SIGNIFICANT CHANGE UP (ref 60–90)
SAO2 % BLDA: 98.2 % — HIGH (ref 94–98)
SODIUM SERPL-SCNC: 132 MMOL/L — LOW (ref 135–145)
SURGICAL PATHOLOGY STUDY: SIGNIFICANT CHANGE UP
WBC # BLD: 10.22 K/UL — SIGNIFICANT CHANGE UP (ref 3.8–10.5)
WBC # FLD AUTO: 10.22 K/UL — SIGNIFICANT CHANGE UP (ref 3.8–10.5)

## 2024-06-19 PROCEDURE — 93010 ELECTROCARDIOGRAM REPORT: CPT

## 2024-06-19 RX ORDER — OXYCODONE HYDROCHLORIDE 100 MG/5ML
1 SOLUTION ORAL
Qty: 20 | Refills: 0
Start: 2024-06-19 | End: 2024-06-23

## 2024-06-19 RX ORDER — POLYETHYLENE GLYCOL 3350 1 G/G
17 POWDER ORAL
Qty: 238 | Refills: 0
Start: 2024-06-19 | End: 2024-07-02

## 2024-06-19 RX ORDER — TAMSULOSIN HYDROCHLORIDE 0.4 MG/1
1 CAPSULE ORAL
Qty: 30 | Refills: 0
Start: 2024-06-19 | End: 2024-07-18

## 2024-06-19 RX ORDER — WARFARIN SODIUM 4 MG/1
1 TABLET ORAL
Qty: 30 | Refills: 0
Start: 2024-06-19 | End: 2024-07-18

## 2024-06-19 RX ORDER — ACETAMINOPHEN 325 MG
2 TABLET ORAL
Qty: 0 | Refills: 0 | DISCHARGE
Start: 2024-06-19

## 2024-06-19 RX ORDER — METOPROLOL TARTRATE 50 MG
1 TABLET ORAL
Refills: 0 | DISCHARGE

## 2024-06-19 RX ORDER — ATORVASTATIN CALCIUM 20 MG/1
1 TABLET, FILM COATED ORAL
Qty: 30 | Refills: 0
Start: 2024-06-19 | End: 2024-07-18

## 2024-06-19 RX ORDER — SENNOSIDES 8.6 MG
2 TABLET ORAL
Qty: 28 | Refills: 0
Start: 2024-06-19 | End: 2024-07-02

## 2024-06-19 RX ORDER — ASPIRIN/CALCIUM CARB/MAGNESIUM 324 MG
0 TABLET ORAL
Refills: 0 | DISCHARGE

## 2024-06-19 RX ORDER — ASPIRIN 325 MG/1
1 TABLET, FILM COATED ORAL
Qty: 30 | Refills: 0
Start: 2024-06-19 | End: 2024-07-18

## 2024-06-19 RX ORDER — PANTOPRAZOLE SODIUM 40 MG/10ML
1 INJECTION, POWDER, FOR SOLUTION INTRAVENOUS
Qty: 30 | Refills: 0
Start: 2024-06-19 | End: 2024-07-18

## 2024-06-19 RX ORDER — LISINOPRIL 2.5 MG/1
1 TABLET ORAL
Refills: 0 | DISCHARGE

## 2024-06-19 RX ORDER — AMIODARONE HYDROCHLORIDE 50 MG/ML
1 INJECTION, SOLUTION INTRAVENOUS
Qty: 30 | Refills: 0
Start: 2024-06-19 | End: 2024-07-18

## 2024-06-19 RX ORDER — METOPROLOL TARTRATE 50 MG
1 TABLET ORAL
Qty: 60 | Refills: 0
Start: 2024-06-19 | End: 2024-07-18

## 2024-06-19 RX ADMIN — Medication 25 MILLIGRAM(S): at 06:13

## 2024-06-19 RX ADMIN — ASPIRIN 81 MILLIGRAM(S): 325 TABLET, FILM COATED ORAL at 12:32

## 2024-06-19 RX ADMIN — Medication 3 MILLILITER(S): at 06:13

## 2024-06-19 RX ADMIN — OXYCODONE HYDROCHLORIDE 5 MILLIGRAM(S): 100 SOLUTION ORAL at 06:18

## 2024-06-19 RX ADMIN — OXYCODONE HYDROCHLORIDE 5 MILLIGRAM(S): 100 SOLUTION ORAL at 07:11

## 2024-06-19 RX ADMIN — Medication 1 APPLICATION(S): at 10:13

## 2024-06-19 RX ADMIN — PANTOPRAZOLE SODIUM 40 MILLIGRAM(S): 40 INJECTION, POWDER, FOR SOLUTION INTRAVENOUS at 12:33

## 2024-06-19 RX ADMIN — AMIODARONE HYDROCHLORIDE 200 MILLIGRAM(S): 50 INJECTION, SOLUTION INTRAVENOUS at 06:14

## 2024-06-20 ENCOUNTER — APPOINTMENT (OUTPATIENT)
Dept: CARE COORDINATION | Facility: HOME HEALTH | Age: 64
End: 2024-06-20
Payer: COMMERCIAL

## 2024-06-20 VITALS
DIASTOLIC BLOOD PRESSURE: 70 MMHG | BODY MASS INDEX: 26.94 KG/M2 | RESPIRATION RATE: 16 BRPM | SYSTOLIC BLOOD PRESSURE: 125 MMHG | HEART RATE: 98 BPM | OXYGEN SATURATION: 97 % | WEIGHT: 172 LBS

## 2024-06-20 PROCEDURE — 99024 POSTOP FOLLOW-UP VISIT: CPT

## 2024-06-20 RX ORDER — METOPROLOL TARTRATE 50 MG/1
50 TABLET, FILM COATED ORAL
Qty: 180 | Refills: 3 | Status: DISCONTINUED | COMMUNITY
Start: 2018-01-30 | End: 2024-06-20

## 2024-06-20 RX ORDER — WARFARIN 3 MG/1
3 TABLET ORAL DAILY
Refills: 0 | Status: ACTIVE | COMMUNITY

## 2024-06-20 RX ORDER — LISINOPRIL 5 MG/1
5 TABLET ORAL DAILY
Qty: 90 | Refills: 2 | Status: DISCONTINUED | COMMUNITY
Start: 2019-01-29 | End: 2024-06-20

## 2024-06-20 RX ORDER — SENNOSIDES 8.6 MG/1
8.6 CAPSULE, GELATIN COATED ORAL
Refills: 0 | Status: ACTIVE | COMMUNITY

## 2024-06-20 RX ORDER — METOPROLOL TARTRATE 25 MG/1
25 TABLET, FILM COATED ORAL
Qty: 60 | Refills: 0 | Status: DISCONTINUED | COMMUNITY
End: 2024-06-20

## 2024-06-20 RX ORDER — PANTOPRAZOLE SODIUM 40 MG/1
40 TABLET, DELAYED RELEASE ORAL DAILY
Qty: 30 | Refills: 0 | Status: ACTIVE | COMMUNITY

## 2024-06-20 RX ORDER — OXYCODONE 10 MG/1
10 TABLET ORAL EVERY 6 HOURS
Qty: 28 | Refills: 0 | Status: DISCONTINUED | COMMUNITY
End: 2024-06-20

## 2024-06-20 RX ORDER — POLYETHYLENE GLYCOL 3350
POWDER (GRAM) MISCELLANEOUS
Qty: 1 | Refills: 0 | Status: ACTIVE | COMMUNITY

## 2024-06-20 RX ORDER — AMIODARONE HYDROCHLORIDE 200 MG/1
200 TABLET ORAL DAILY
Qty: 30 | Refills: 0 | Status: ACTIVE | COMMUNITY

## 2024-06-20 NOTE — PHYSICAL EXAM
[] : no respiratory distress [Exaggerated Use Of Accessory Muscles For Inspiration] : no accessory muscle use [Auscultation Breath Sounds / Voice Sounds] : lungs were clear to auscultation bilaterally [Heart Sounds] : normal S1 and S2 [Chest Visual Inspection Thoracic Asymmetry] : no chest asymmetry [Bowel Sounds] : normal bowel sounds [Oriented To Time, Place, And Person] : oriented to person, place, and time [FreeTextEntry2] : B/L LE trace edema [FreeTextEntry1] : LBM 6/19

## 2024-06-20 NOTE — ASSESSMENT
[FreeTextEntry1] : Pt recovering well at home s/p OHS. Good support noted from nephew Herb Osorio who is also pt. PCP. Reviewed all medications and dosages with pt understanding. Pt has all medications in home and is taking as prescribed. Pain controlled with current medication regimen. Pt. did not  oxycodone from pharmacy. As per pt. and nephew-pt. had a near syncopal episode yesterday upon arriving home from the hospital. EMS was called and vitals were stable. Pt. decided not to go back to hospital. As per nephew,  is aware and advised to hold metoprolol. Orthostatic negative. Pt. denies dizziness, palpitations or SOB. Discussed pt. with  and he is okay with HR ranging between . Reinforced with pt. to change positions slowly and maintain adequate hydration. Pt. is on coumadin and INR level to be drawn 6/21 and monitored by PCP. [facility] SOC 6/20

## 2024-06-20 NOTE — HISTORY OF PRESENT ILLNESS
[FreeTextEntry1] : 4 year old male with history of severe MR s/p MV-repair/ring and TR with  TV-repair/ring UTE with clip/MAZE who presented [facility] with a complaint of  shortness of breath and palpitations found to be in atrial fibrillation, as he  is planned for surgery on 6/12/24 the patient was transferred to [facility] for  continued management. Upon admission patient was in afib and converted  without intervention to sinus john in the 50's.  He will undergo redo MVR TVR  with Dr. Westbrook on 6/12/24.    Hospital Course:  6/9 maintaining SB 50's. No significant hemodynamic stenosis of either carotid  artery. Hydralazine for BP control.  6/10 maintaining NSR - SB. Pending Cardiac Catheterization today. Hep gtt  adjusted for supra-therapeutic PTT, to be held prior to catheterization.  6/12 s/p Reoperative sternotomy / Mitral valve replacement with 27 Mitris /  Epicardial LV lead placement.  Post op Course:  Inotropic support required weaned off  HTN requiring Cardene gtt Weaned off  Acute blood loss anemia --> Transfused with 2 units plts in OR, 1u PRBCs and 1u  Albumin in CTU  Extubated POD #0  Stress Hyperglycemia on Insulin gtt  Thrombocytopenia --> HIT negative.  No DVT prophylaxis at this time.  6/14 VSS; Transferred to [facility] Telemetry floor.  Continue with current  medication regimen.  6/15 Plt count recovering 61. Episode of near syncope. No event on tele. BP WNL  maintain PW and mediastinal/left pleural chest tubes.  6/16 Syncopal episode with LOC sec and tonic extension of UPPer extremities.  No events on telemetry, FS WNL, no orthostatic BP. No sensory and motor  deficits post event. Neuro consulted rec MRI and EEG. Echo pending.  Diuretics and b-blocker discontinued. EPW removed. MRI deferred secondary to  presence of abandoned internal  PW  6/17 MRI and EEG canceled. mediastinal chest tubes removed. PLT count  recovering. Coumadin 3mg tonight for MV  6/18 VSS shower this am - coumadin tonight - d/c plan home tomorrow. Home no PT  needs once medically cleared.  6/19 VSS; Patient medically cleared for discharge home today per Dr. Westbrook.  Patient to follow up initial coumadin blood work with NP Herb Osorio on  Friday 6/21/24 at 11:00am. Discharged on Coumadin 3 mg PO nightly for a  therapeutic INR 2-3.  Current INR 1.22. 6/20 Initial visit completed at home  CC " I feel good"

## 2024-06-28 PROBLEM — Z98.890 S/P TRICUSPID VALVE REPAIR: Status: ACTIVE | Noted: 2024-04-02

## 2024-07-02 ENCOUNTER — APPOINTMENT (OUTPATIENT)
Dept: CARDIOTHORACIC SURGERY | Facility: CLINIC | Age: 64
End: 2024-07-02
Payer: COMMERCIAL

## 2024-07-02 VITALS
WEIGHT: 167 LBS | RESPIRATION RATE: 16 BRPM | HEART RATE: 98 BPM | HEIGHT: 67 IN | OXYGEN SATURATION: 10 % | DIASTOLIC BLOOD PRESSURE: 87 MMHG | SYSTOLIC BLOOD PRESSURE: 135 MMHG | TEMPERATURE: 208.04 F | BODY MASS INDEX: 26.21 KG/M2

## 2024-07-02 PROBLEM — Z95.2 S/P MITRAL VALVE REPLACEMENT: Status: ACTIVE | Noted: 2024-07-02

## 2024-07-02 PROCEDURE — 99024 POSTOP FOLLOW-UP VISIT: CPT

## 2024-07-11 ENCOUNTER — INPATIENT (INPATIENT)
Facility: HOSPITAL | Age: 64
LOS: 0 days | Discharge: HOME | DRG: 307 | End: 2024-07-12
Attending: STUDENT IN AN ORGANIZED HEALTH CARE EDUCATION/TRAINING PROGRAM | Admitting: STUDENT IN AN ORGANIZED HEALTH CARE EDUCATION/TRAINING PROGRAM
Payer: COMMERCIAL

## 2024-07-11 ENCOUNTER — APPOINTMENT (OUTPATIENT)
Dept: CARDIOLOGY | Facility: CLINIC | Age: 64
End: 2024-07-11
Payer: COMMERCIAL

## 2024-07-11 ENCOUNTER — NON-APPOINTMENT (OUTPATIENT)
Age: 64
End: 2024-07-11

## 2024-07-11 VITALS
WEIGHT: 169 LBS | RESPIRATION RATE: 16 BRPM | DIASTOLIC BLOOD PRESSURE: 70 MMHG | HEIGHT: 67 IN | OXYGEN SATURATION: 98 % | SYSTOLIC BLOOD PRESSURE: 118 MMHG | BODY MASS INDEX: 26.53 KG/M2 | HEART RATE: 105 BPM

## 2024-07-11 VITALS
DIASTOLIC BLOOD PRESSURE: 75 MMHG | SYSTOLIC BLOOD PRESSURE: 128 MMHG | OXYGEN SATURATION: 91 % | HEART RATE: 95 BPM | RESPIRATION RATE: 18 BRPM | WEIGHT: 166.67 LBS | HEIGHT: 67 IN

## 2024-07-11 DIAGNOSIS — Z98.890 OTHER SPECIFIED POSTPROCEDURAL STATES: Chronic | ICD-10-CM

## 2024-07-11 DIAGNOSIS — Z95.2 PRESENCE OF PROSTHETIC HEART VALVE: ICD-10-CM

## 2024-07-11 DIAGNOSIS — Z98.890 OTHER SPECIFIED POSTPROCEDURAL STATES: ICD-10-CM

## 2024-07-11 DIAGNOSIS — N40.0 BENIGN PROSTATIC HYPERPLASIA WITHOUT LOWER URINARY TRACT SYMPTOMS: ICD-10-CM

## 2024-07-11 DIAGNOSIS — Z95.2 PRESENCE OF PROSTHETIC HEART VALVE: Chronic | ICD-10-CM

## 2024-07-11 DIAGNOSIS — I34.81 NONRHEUMATIC MITRAL (VALVE) ANNULUS CALCIFICATION: ICD-10-CM

## 2024-07-11 DIAGNOSIS — I48.91 UNSPECIFIED ATRIAL FIBRILLATION: ICD-10-CM

## 2024-07-11 DIAGNOSIS — R00.0 TACHYCARDIA, UNSPECIFIED: ICD-10-CM

## 2024-07-11 LAB
A1C WITH ESTIMATED AVERAGE GLUCOSE RESULT: 6.2 % — HIGH (ref 4–5.6)
ALBUMIN SERPL ELPH-MCNC: 4.1 G/DL — SIGNIFICANT CHANGE UP (ref 3.3–5)
ALP SERPL-CCNC: 82 U/L — SIGNIFICANT CHANGE UP (ref 40–120)
ALT FLD-CCNC: 19 U/L — SIGNIFICANT CHANGE UP (ref 10–45)
ANION GAP SERPL CALC-SCNC: 12 MMOL/L — SIGNIFICANT CHANGE UP (ref 5–17)
APTT BLD: 38.4 SEC — HIGH (ref 24.5–35.6)
APTT BLD: 57.5 SEC — HIGH (ref 24.5–35.6)
AST SERPL-CCNC: 26 U/L — SIGNIFICANT CHANGE UP (ref 10–40)
BASOPHILS # BLD AUTO: 0.05 K/UL — SIGNIFICANT CHANGE UP (ref 0–0.2)
BASOPHILS NFR BLD AUTO: 0.8 % — SIGNIFICANT CHANGE UP (ref 0–2)
BILIRUB SERPL-MCNC: 0.4 MG/DL — SIGNIFICANT CHANGE UP (ref 0.2–1.2)
BLD GP AB SCN SERPL QL: POSITIVE — SIGNIFICANT CHANGE UP
BUN SERPL-MCNC: 13 MG/DL — SIGNIFICANT CHANGE UP (ref 7–23)
CALCIUM SERPL-MCNC: 9.4 MG/DL — SIGNIFICANT CHANGE UP (ref 8.4–10.5)
CHLORIDE SERPL-SCNC: 102 MMOL/L — SIGNIFICANT CHANGE UP (ref 96–108)
CO2 SERPL-SCNC: 25 MMOL/L — SIGNIFICANT CHANGE UP (ref 22–31)
CREAT SERPL-MCNC: 1.34 MG/DL — HIGH (ref 0.5–1.3)
EGFR: 60 ML/MIN/1.73M2 — SIGNIFICANT CHANGE UP
EOSINOPHIL # BLD AUTO: 0.14 K/UL — SIGNIFICANT CHANGE UP (ref 0–0.5)
EOSINOPHIL NFR BLD AUTO: 2.3 % — SIGNIFICANT CHANGE UP (ref 0–6)
ESTIMATED AVERAGE GLUCOSE: 131 MG/DL — HIGH (ref 68–114)
GLUCOSE SERPL-MCNC: 87 MG/DL — SIGNIFICANT CHANGE UP (ref 70–99)
HCT VFR BLD CALC: 29.4 % — LOW (ref 39–50)
HCT VFR BLD CALC: 31.5 % — LOW (ref 39–50)
HGB BLD-MCNC: 9 G/DL — LOW (ref 13–17)
HGB BLD-MCNC: 9.6 G/DL — LOW (ref 13–17)
IMM GRANULOCYTES NFR BLD AUTO: 0.3 % — SIGNIFICANT CHANGE UP (ref 0–0.9)
INR BLD: 1.9 RATIO — HIGH (ref 0.85–1.18)
LYMPHOCYTES # BLD AUTO: 1.61 K/UL — SIGNIFICANT CHANGE UP (ref 1–3.3)
LYMPHOCYTES # BLD AUTO: 26.7 % — SIGNIFICANT CHANGE UP (ref 13–44)
MCHC RBC-ENTMCNC: 25.6 PG — LOW (ref 27–34)
MCHC RBC-ENTMCNC: 25.7 PG — LOW (ref 27–34)
MCHC RBC-ENTMCNC: 30.5 GM/DL — LOW (ref 32–36)
MCHC RBC-ENTMCNC: 30.6 GM/DL — LOW (ref 32–36)
MCV RBC AUTO: 83.8 FL — SIGNIFICANT CHANGE UP (ref 80–100)
MCV RBC AUTO: 84.5 FL — SIGNIFICANT CHANGE UP (ref 80–100)
MONOCYTES # BLD AUTO: 0.48 K/UL — SIGNIFICANT CHANGE UP (ref 0–0.9)
MONOCYTES NFR BLD AUTO: 7.9 % — SIGNIFICANT CHANGE UP (ref 2–14)
NEUTROPHILS # BLD AUTO: 3.74 K/UL — SIGNIFICANT CHANGE UP (ref 1.8–7.4)
NEUTROPHILS NFR BLD AUTO: 62 % — SIGNIFICANT CHANGE UP (ref 43–77)
NRBC # BLD: 0 /100 WBCS — SIGNIFICANT CHANGE UP (ref 0–0)
NRBC # BLD: 0 /100 WBCS — SIGNIFICANT CHANGE UP (ref 0–0)
NT-PROBNP SERPL-SCNC: 1447 PG/ML — HIGH (ref 0–300)
PLATELET # BLD AUTO: 224 K/UL — SIGNIFICANT CHANGE UP (ref 150–400)
PLATELET # BLD AUTO: 238 K/UL — SIGNIFICANT CHANGE UP (ref 150–400)
POTASSIUM SERPL-MCNC: 4.3 MMOL/L — SIGNIFICANT CHANGE UP (ref 3.5–5.3)
POTASSIUM SERPL-SCNC: 4.3 MMOL/L — SIGNIFICANT CHANGE UP (ref 3.5–5.3)
PROT SERPL-MCNC: 8 G/DL — SIGNIFICANT CHANGE UP (ref 6–8.3)
PROTHROM AB SERPL-ACNC: 20.5 SEC — HIGH (ref 9.5–13)
RBC # BLD: 3.51 M/UL — LOW (ref 4.2–5.8)
RBC # BLD: 3.73 M/UL — LOW (ref 4.2–5.8)
RBC # FLD: 14.7 % — HIGH (ref 10.3–14.5)
RBC # FLD: 14.8 % — HIGH (ref 10.3–14.5)
RH IG SCN BLD-IMP: POSITIVE — SIGNIFICANT CHANGE UP
SODIUM SERPL-SCNC: 139 MMOL/L — SIGNIFICANT CHANGE UP (ref 135–145)
TSH SERPL-MCNC: 5.46 UIU/ML — HIGH (ref 0.27–4.2)
WBC # BLD: 6.04 K/UL — SIGNIFICANT CHANGE UP (ref 3.8–10.5)
WBC # BLD: 7.69 K/UL — SIGNIFICANT CHANGE UP (ref 3.8–10.5)
WBC # FLD AUTO: 6.04 K/UL — SIGNIFICANT CHANGE UP (ref 3.8–10.5)
WBC # FLD AUTO: 7.69 K/UL — SIGNIFICANT CHANGE UP (ref 3.8–10.5)

## 2024-07-11 PROCEDURE — 86077 PHYS BLOOD BANK SERV XMATCH: CPT

## 2024-07-11 PROCEDURE — 93000 ELECTROCARDIOGRAM COMPLETE: CPT

## 2024-07-11 PROCEDURE — 99222 1ST HOSP IP/OBS MODERATE 55: CPT

## 2024-07-11 PROCEDURE — 99223 1ST HOSP IP/OBS HIGH 75: CPT

## 2024-07-11 PROCEDURE — 93010 ELECTROCARDIOGRAM REPORT: CPT

## 2024-07-11 PROCEDURE — 99215 OFFICE O/P EST HI 40 MIN: CPT | Mod: 25

## 2024-07-11 PROCEDURE — G2211 COMPLEX E/M VISIT ADD ON: CPT | Mod: NC

## 2024-07-11 RX ORDER — ASPIRIN 325 MG/1
81 TABLET, FILM COATED ORAL DAILY
Refills: 0 | Status: DISCONTINUED | OUTPATIENT
Start: 2024-07-11 | End: 2024-07-12

## 2024-07-11 RX ORDER — PANTOPRAZOLE SODIUM 40 MG/10ML
40 INJECTION, POWDER, FOR SOLUTION INTRAVENOUS
Refills: 0 | Status: DISCONTINUED | OUTPATIENT
Start: 2024-07-11 | End: 2024-07-12

## 2024-07-11 RX ORDER — AMIODARONE HYDROCHLORIDE 50 MG/ML
400 INJECTION, SOLUTION INTRAVENOUS EVERY 8 HOURS
Refills: 0 | Status: DISCONTINUED | OUTPATIENT
Start: 2024-07-11 | End: 2024-07-12

## 2024-07-11 RX ORDER — TAMSULOSIN HYDROCHLORIDE 0.4 MG/1
0.4 CAPSULE ORAL AT BEDTIME
Refills: 0 | Status: DISCONTINUED | OUTPATIENT
Start: 2024-07-11 | End: 2024-07-12

## 2024-07-11 RX ORDER — SODIUM CHLORIDE 0.9 % (FLUSH) 0.9 %
3 SYRINGE (ML) INJECTION EVERY 8 HOURS
Refills: 0 | Status: DISCONTINUED | OUTPATIENT
Start: 2024-07-11 | End: 2024-07-12

## 2024-07-11 RX ORDER — METOPROLOL TARTRATE 50 MG
25 TABLET ORAL THREE TIMES A DAY
Refills: 0 | Status: DISCONTINUED | OUTPATIENT
Start: 2024-07-11 | End: 2024-07-12

## 2024-07-11 RX ORDER — AMIODARONE HYDROCHLORIDE 50 MG/ML
INJECTION, SOLUTION INTRAVENOUS
Refills: 0 | Status: DISCONTINUED | OUTPATIENT
Start: 2024-07-11 | End: 2024-07-12

## 2024-07-11 RX ORDER — AMIODARONE HYDROCHLORIDE 50 MG/ML
200 INJECTION, SOLUTION INTRAVENOUS DAILY
Refills: 0 | Status: DISCONTINUED | OUTPATIENT
Start: 2024-07-11 | End: 2024-07-11

## 2024-07-11 RX ORDER — WARFARIN SODIUM 4 MG/1
5 TABLET ORAL ONCE
Refills: 0 | Status: COMPLETED | OUTPATIENT
Start: 2024-07-11 | End: 2024-07-11

## 2024-07-11 RX ORDER — AMIODARONE HYDROCHLORIDE 50 MG/ML
200 INJECTION, SOLUTION INTRAVENOUS DAILY
Refills: 0 | Status: CANCELLED | OUTPATIENT
Start: 2024-07-15 | End: 2024-07-12

## 2024-07-11 RX ORDER — HEPARIN SODIUM 50 [USP'U]/ML
950 INJECTION, SOLUTION INTRAVENOUS
Qty: 25000 | Refills: 0 | Status: DISCONTINUED | OUTPATIENT
Start: 2024-07-11 | End: 2024-07-12

## 2024-07-11 RX ORDER — ATORVASTATIN CALCIUM 20 MG/1
20 TABLET, FILM COATED ORAL AT BEDTIME
Refills: 0 | Status: DISCONTINUED | OUTPATIENT
Start: 2024-07-11 | End: 2024-07-12

## 2024-07-11 RX ORDER — HEPARIN SODIUM 50 [USP'U]/ML
950 INJECTION, SOLUTION INTRAVENOUS
Qty: 25000 | Refills: 0 | Status: DISCONTINUED | OUTPATIENT
Start: 2024-07-11 | End: 2024-07-11

## 2024-07-11 RX ADMIN — HEPARIN SODIUM 10 UNIT(S)/HR: 50 INJECTION, SOLUTION INTRAVENOUS at 23:24

## 2024-07-11 RX ADMIN — Medication 25 MILLIGRAM(S): at 13:48

## 2024-07-11 RX ADMIN — Medication 25 MILLIGRAM(S): at 21:20

## 2024-07-11 RX ADMIN — HEPARIN SODIUM 9.5 UNIT(S)/HR: 50 INJECTION, SOLUTION INTRAVENOUS at 16:38

## 2024-07-11 RX ADMIN — AMIODARONE HYDROCHLORIDE 400 MILLIGRAM(S): 50 INJECTION, SOLUTION INTRAVENOUS at 21:19

## 2024-07-11 RX ADMIN — ATORVASTATIN CALCIUM 20 MILLIGRAM(S): 20 TABLET, FILM COATED ORAL at 21:19

## 2024-07-11 RX ADMIN — Medication 3 MILLILITER(S): at 21:21

## 2024-07-11 RX ADMIN — HEPARIN SODIUM 9.5 UNIT(S)/HR: 50 INJECTION, SOLUTION INTRAVENOUS at 21:19

## 2024-07-11 RX ADMIN — TAMSULOSIN HYDROCHLORIDE 0.4 MILLIGRAM(S): 0.4 CAPSULE ORAL at 21:19

## 2024-07-11 RX ADMIN — WARFARIN SODIUM 5 MILLIGRAM(S): 4 TABLET ORAL at 21:20

## 2024-07-11 RX ADMIN — Medication 3 MILLILITER(S): at 13:50

## 2024-07-11 NOTE — H&P ADULT - NSHPPHYSICALEXAM_GEN_ALL_CORE
General: NAD  HEENT:  NC/AT  Neuro: A&Ox4, gait steady, speech clear, no focal deficits noted  Respiratory: B/L BS CTA, no wheeze, no rhonchi, no crackles noted  Cardiovascular: RRR, normal S1S2, no murmur noted  GI: Abd soft, NT/ND, +BSx4Q +BM  Peripheral Vascular:  B/L LE neg edema, 2+ peripheral pulses, no clubbing, cyanosis, varicosities/PVD noted  Psychiatric: Normal mood, normal affect observed  Skin: Normal exam to inspection and palpation. no bleeding, no hematoma.

## 2024-07-11 NOTE — CONSULT NOTE ADULT - SUBJECTIVE AND OBJECTIVE BOX
CHIEF COMPLAINT:  Afib management    HISTORY OF PRESENT ILLNESS:  Rodney Dasilva is a 64 year old male with PMHx of HTN, HLD, BPH, hx of PVCs, hx of severe mitral regurgitation (s/p mitral valve repair in 2016), hx of tricuspid regurgitation (s/p TV repair in 2016), hx of atrial fibrillation (s/p Maze procedure in 2016 and s/p ablation in 2017), s/p Reoperative sternotomy Mitral valve replacement with 27 Mitris Epicardial LV lead placement on June 12th 2024 with Dr. Westbrook. Today presents to the CT surgery office after noticing on his apple watch that his HR was in the 170s. Pt was found to be in afib and admitted for possible cardioversion with EP service.       Allergies    No Known Allergies    Intolerances    	    MEDICATIONS:  Coumadin recently increased to 5mg qhs  aMIOdarone  Tablet 200 milliGRAM(s) Oral daily  aspirin  chewable 81 milliGRAM(s) Oral daily  metoprolol tartrate 25 milliGRAM(s) Oral three times a day  pantoprazole  Tablet 40 milliGRAM(s) Oral before breakfast  atorvastatin 20 milliGRAM(s) Oral at bedtime  sodium chloride 0.9% lock flush 3 milliLiter(s) IV Push every 8 hours  tamsulosin 0.4 milliGRAM(s) Oral at bedtime      PAST MEDICAL & SURGICAL HISTORY:  Mitral insufficiency    BPH (benign prostatic hyperplasia)    HTN (hypertension)    HLD (hyperlipidemia)    History of atrial fibrillation    H/O mitral valve repair    H/O tricuspid valve repair    S/P Maze operation for atrial fibrillation    S/P ablation of atrial fibrillation    S/P mitral valve replacement        FAMILY HISTORY:      SOCIAL HISTORY:    Non-smoker  Non ETOH      REVIEW OF SYSTEMS:  See HPI. Otherwise, 12 point ROS done and otherwise negative.    PHYSICAL EXAM:  T(C): --  HR: 75 (07-11-24 @ 13:47) (75 - 95)  BP: 126/75 (07-11-24 @ 13:47) (126/75 - 128/75)  RR: 18 (07-11-24 @ 13:47) (18 - 18)  SpO2: 98% (07-11-24 @ 13:47) (91% - 98%)  Wt(kg): --  I&O's Summary      Appearance: Normal, in NAD  Head: normocephalic, atraumatic  Neck: supple  Cardiovascular: irregular S1 S2, No JVD, no r/g  Respiratory: Lungs clear to auscultation	  Psychiatry: A & O x 3, Mood & affect appropriate  Gastrointestinal:  Soft, Non-tender, + BS	  " voiding  Skin: No rashes, No ecchymoses  Neurologic: Non-focal  Extremities: Normal range of motion, No clubbing, cyanosis or edema  Vascular: Peripheral pulses palpable 2+ bilaterally        LABS:	 	    CBC Full  -  ( 11 Jul 2024 13:57 )  WBC Count : 6.04 K/uL  Hemoglobin : 9.0 g/dL  Hematocrit : 29.4 %  Platelet Count - Automated : 238 K/uL  Mean Cell Volume : 83.8 fl  Mean Cell Hemoglobin : 25.6 pg  Mean Cell Hemoglobin Concentration : 30.6 gm/dL  Auto Neutrophil # : 3.74 K/uL  Auto Lymphocyte # : 1.61 K/uL  Auto Monocyte # : 0.48 K/uL  Auto Eosinophil # : 0.14 K/uL  Auto Basophil # : 0.05 K/uL  Auto Neutrophil % : 62.0 %  Auto Lymphocyte % : 26.7 %  Auto Monocyte % : 7.9 %  Auto Eosinophil % : 2.3 %  Auto Basophil % : 0.8 %        proBNP:   Lipid Profile:   HgA1c:   TSH:       CARDIAC MARKERS:      TELEMETRY: Afib 70-90s  	    ECG Afib @ 112 bpm, narrow QRSd 110ms, PVC    Echo 6/16/24:    CONCLUSIONS:      1. Left ventricular systolic function is normal with an ejection fraction visually estimated at 50 to 55 %.   2. Normal right ventricular cavity size, with normal wall thickness, and normal systolic function.   3. No pericardial effusion seen.   4. An annuloplasty ring is noted in the tricuspid position..Moderate valvular intravalvular regurgitation of the tricuspid valve prosthesis. No paravalvular regurgitation.   5. Bioprosthetic valve present. in the mitral position. Well seated prosthetic mitral valve with normal function with normal leaflet excursion. There is trace intravalvular mitral regurgitation. No paravalvular mitral regurgitation.   6. Compared to the transthoracic echocardiogram performed on 6/16/2024, there have been no significant interval changes.    ________________________________________________________________________________________  FINDINGS:     Left Ventricle:  Left ventricular systolic function is normal with an ejection fraction visually estimated at 50 to 55%. Unable to assess left ventricular diastolic function due to insufficient data. Analysis of left ventricular diastolic function and filling pressure is made challenging by the presence of prosthetic mitral valve.     Right Ventricle:  The right ventricular cavity is normal in size, with normal wall thickness and normal systolic function.     Aortic Valve:  The aortic valve is tricuspid with normal leaflet excursion. There is no aortic valve stenosis. There is trace aortic regurgitation.     Mitral Valve:  Bioprosthetic valve is present in the mitral position. The mitral valve prosthesis is well seated with normal function with normal leaflet excursion. Trace intravalvular mitral regurgitation. There is no paravalvular regurgitation.     Tricuspid Valve:  An annuloplasty ring is noted in the tricuspid position. There is moderate intravalvular regurgitation of the tricuspid valve prosthesis. There is no paravalvular regurgitation.     Pericardium:  No pericardial effusion seen.  ____________________________________________________________________  QUANTITATIVE DATA:  Left Ventricle Measurements: (Indexed to BSA)     Visualized LV EF%: 50 to 55%

## 2024-07-11 NOTE — H&P ADULT - HISTORY OF PRESENT ILLNESS
Rodney Dasilva is a 64 year old male with PMHx of HTN, HLD, BPH, hx of PVCs, hx of severe mitral regurgitation (s/p mitral valve repair in 2016), hx of tricuspid regurgitation (s/p TV repair in 2016), hx of atrial fibrillation (s/p Maze procedure in 2016 and s/p ablation in 2017), s/p Reoperative sternotomy Mitral valve replacement with 27 Mitris Epicardial LV lead placement on June 12th 2024 with Dr. Westbrook. Today presents to the CT surgery office after noticing on his apple watch that his HR was in the 170s. Pt was found to be in afib and admitted for possible cardioversion with EP service.   .

## 2024-07-11 NOTE — CONSULT NOTE ADULT - NS ATTEND AMEND GEN_ALL_CORE FT
This patient is having an atrial tachycardia with variable conduction  (1:1 earlier today as well as slower conduction). He is relatively asymptomatic and this is too soon after OHS to consider catheter ablation. These arrhythmias are not uncommon after surgical MAZE. His UTE is closed so he will not need OAC after the three months post MVR. Will achieve better rate control and plan ablation as outpatient after 2-3 months if needed. Continue OAC and amiodarone for now along with metoprolol. Could consider cardioversion but likely to recur.

## 2024-07-11 NOTE — H&P ADULT - NSICDXPASTSURGICALHX_GEN_ALL_CORE_FT
PAST SURGICAL HISTORY:  H/O mitral valve repair     H/O tricuspid valve repair     S/P ablation of atrial fibrillation     S/P Maze operation for atrial fibrillation     S/P mitral valve replacement

## 2024-07-11 NOTE — CONSULT NOTE ADULT - ASSESSMENT
64 year old male with PMHx of HTN, HLD, BPH, hx of PVCs, BPH, hx of severe mitral regurgitation (s/p mitral valve repair in 2016), hx of tricuspid regurgitation (s/p TV repair in 2016), hx of atrial fibrillation (s/p Maze procedure in 2016 and s/p ablation in 2017), s/p Reoperative sternotomy Mitral valve replacement with 27 Mitris Epicardial LV lead placement on June 12th 2024 with Dr. Westbrook. Pt was discharged on 200mg Amio QD and Lop 25mg BID. He presents to the CT surgery office after noticing on his apple watch that his HR was in the 170s for the past two days. Pt was found to be in afib and admitted for possible cardioversion.    1. Afib   2. s/p MVR/TVR/Epicardial LV Lead placement    - Admit to telemetry for cardiac monitoring  - Check admit labs: CBC, BMP, Coags  - On Coumadin, recently increased from 3mg to 5mg qhs last week  - On Amio 200mg qd post op MVR/TVR and Metoprolol Tartrate 25mg tid  - Patient had breakfast this morning  - Will d/w EP attdg    #[phone]    64 year old male with PMHx of HTN, HLD, BPH, hx of PVCs, BPH, hx of severe mitral regurgitation (s/p mitral valve repair in 2016), hx of tricuspid regurgitation (s/p TV repair in 2016), hx of atrial fibrillation (s/p Maze procedure in 2016 and s/p ablation in 2017), s/p Reoperative sternotomy Mitral valve replacement with 27 Mitris Epicardial LV lead placement on June 12th 2024 with Dr. Westbrook. Pt was discharged on 200mg Amio QD and Lop 25mg BID. He presents to the CT surgery office after noticing on his apple watch that his HR was in the 170s for the past two days. Pt was found to be in afib and admitted for possible cardioversion.    1. Afib   2. s/p MVR/TVR/Epicardial LV Lead placement    - Admit to telemetry for cardiac monitoring  - Check admit labs: CBC, BMP, Coags  - On Coumadin, recently increased from 3mg to 5mg qhs last week  - On Amio 200mg qd post op MVR/TVR and Metoprolol Tartrate 25mg tid  - Patient had breakfast this morning  - Will d/w EP attdg    #[phone]    Addendum:    - ECG reviewed with EP attdg, c/w Atach, rates now 70-90s, patient asymptomatic  - Continue rate control strategy/betablocker  - Outpatient follow up with Dr Calderon    #[phone]

## 2024-07-11 NOTE — PRE-OP CHECKLIST - NEEDLE GAUGE
Advised patient that her STD screening results have not come back yet. Patient verbalized understanding and has no further questions or concerns.    Maggie Cool RN - BC       Detail Level: Zone 20

## 2024-07-11 NOTE — H&P ADULT - ASSESSMENT
Rodney Dasilva is a 64 year old male with PMHx of HTN, HLD, BPH, hx of PVCs, hx of severe mitral regurgitation (s/p mitral valve repair in 2016), hx of tricuspid regurgitation (s/p TV repair in 2016), hx of atrial fibrillation (s/p Maze procedure in 2016 and s/p ablation in 2017), s/p Reoperative sternotomy Mitral valve replacement with 27 Mitris Epicardial LV lead placement on June 12th 2024 with Dr. Westbrook. Pt was discharged on 200mg Amio QD and Lop 25mg BID. Today presents to the CT surgery office after noticing on his apple watch that his HR was in the 170s for the past two days. Pt was found to be in afib and admitted for possible cardioversion with EP service. Denies CP, palpitation, SOB, ARMSTRONG, HA, dizziness, N/V/D, fever or chills at this time.       7/11 VSS Afib 's. Lop 25 increased to TID. EP consulted for possible cardioversion.   Disposition: Home when medically optimized

## 2024-07-12 ENCOUNTER — TRANSCRIPTION ENCOUNTER (OUTPATIENT)
Age: 64
End: 2024-07-12

## 2024-07-12 VITALS
DIASTOLIC BLOOD PRESSURE: 87 MMHG | TEMPERATURE: 97 F | SYSTOLIC BLOOD PRESSURE: 130 MMHG | RESPIRATION RATE: 18 BRPM | OXYGEN SATURATION: 100 % | HEART RATE: 68 BPM

## 2024-07-12 LAB
ADD ON TEST-SPECIMEN IN LAB: SIGNIFICANT CHANGE UP
ANION GAP SERPL CALC-SCNC: 11 MMOL/L — SIGNIFICANT CHANGE UP (ref 5–17)
APTT BLD: 56.5 SEC — HIGH (ref 24.5–35.6)
BLD GP AB SCN SERPL QL: POSITIVE — SIGNIFICANT CHANGE UP
BUN SERPL-MCNC: 16 MG/DL — SIGNIFICANT CHANGE UP (ref 7–23)
CALCIUM SERPL-MCNC: 8.8 MG/DL — SIGNIFICANT CHANGE UP (ref 8.4–10.5)
CHLORIDE SERPL-SCNC: 104 MMOL/L — SIGNIFICANT CHANGE UP (ref 96–108)
CO2 SERPL-SCNC: 25 MMOL/L — SIGNIFICANT CHANGE UP (ref 22–31)
CREAT SERPL-MCNC: 1.41 MG/DL — HIGH (ref 0.5–1.3)
EGFR: 56 ML/MIN/1.73M2 — LOW
GLUCOSE SERPL-MCNC: 94 MG/DL — SIGNIFICANT CHANGE UP (ref 70–99)
HCT VFR BLD CALC: 25.8 % — LOW (ref 39–50)
HGB BLD-MCNC: 8.4 G/DL — LOW (ref 13–17)
INR BLD: 2.03 RATIO — HIGH (ref 0.85–1.18)
MAGNESIUM SERPL-MCNC: 2.2 MG/DL — SIGNIFICANT CHANGE UP (ref 1.6–2.6)
MCHC RBC-ENTMCNC: 26.5 PG — LOW (ref 27–34)
MCHC RBC-ENTMCNC: 32.6 GM/DL — SIGNIFICANT CHANGE UP (ref 32–36)
MCV RBC AUTO: 81.4 FL — SIGNIFICANT CHANGE UP (ref 80–100)
NRBC # BLD: 0 /100 WBCS — SIGNIFICANT CHANGE UP (ref 0–0)
PHOSPHATE SERPL-MCNC: 3.6 MG/DL — SIGNIFICANT CHANGE UP (ref 2.5–4.5)
PLATELET # BLD AUTO: 203 K/UL — SIGNIFICANT CHANGE UP (ref 150–400)
POTASSIUM SERPL-MCNC: 4 MMOL/L — SIGNIFICANT CHANGE UP (ref 3.5–5.3)
POTASSIUM SERPL-SCNC: 4 MMOL/L — SIGNIFICANT CHANGE UP (ref 3.5–5.3)
PROTHROM AB SERPL-ACNC: 20.9 SEC — HIGH (ref 9.5–13)
RBC # BLD: 3.17 M/UL — LOW (ref 4.2–5.8)
RBC # FLD: 14.9 % — HIGH (ref 10.3–14.5)
RH IG SCN BLD-IMP: POSITIVE — SIGNIFICANT CHANGE UP
SODIUM SERPL-SCNC: 140 MMOL/L — SIGNIFICANT CHANGE UP (ref 135–145)
WBC # BLD: 6.58 K/UL — SIGNIFICANT CHANGE UP (ref 3.8–10.5)
WBC # FLD AUTO: 6.58 K/UL — SIGNIFICANT CHANGE UP (ref 3.8–10.5)

## 2024-07-12 PROCEDURE — 86891 AUTOLOGOUS BLOOD OP SALVAGE: CPT

## 2024-07-12 PROCEDURE — 84443 ASSAY THYROID STIM HORMONE: CPT

## 2024-07-12 PROCEDURE — C1751: CPT

## 2024-07-12 PROCEDURE — C1894: CPT

## 2024-07-12 PROCEDURE — 82435 ASSAY OF BLOOD CHLORIDE: CPT

## 2024-07-12 PROCEDURE — P9100: CPT

## 2024-07-12 PROCEDURE — 82140 ASSAY OF AMMONIA: CPT

## 2024-07-12 PROCEDURE — P9045: CPT

## 2024-07-12 PROCEDURE — L8699: CPT

## 2024-07-12 PROCEDURE — 97165 OT EVAL LOW COMPLEX 30 MIN: CPT

## 2024-07-12 PROCEDURE — 86900 BLOOD TYPING SEROLOGIC ABO: CPT

## 2024-07-12 PROCEDURE — 82947 ASSAY GLUCOSE BLOOD QUANT: CPT

## 2024-07-12 PROCEDURE — C1889: CPT

## 2024-07-12 PROCEDURE — 84132 ASSAY OF SERUM POTASSIUM: CPT

## 2024-07-12 PROCEDURE — 86850 RBC ANTIBODY SCREEN: CPT

## 2024-07-12 PROCEDURE — 85520 HEPARIN ASSAY: CPT

## 2024-07-12 PROCEDURE — 93306 TTE W/DOPPLER COMPLETE: CPT

## 2024-07-12 PROCEDURE — 84439 ASSAY OF FREE THYROXINE: CPT

## 2024-07-12 PROCEDURE — 86870 RBC ANTIBODY IDENTIFICATION: CPT

## 2024-07-12 PROCEDURE — 84295 ASSAY OF SERUM SODIUM: CPT

## 2024-07-12 PROCEDURE — C1887: CPT

## 2024-07-12 PROCEDURE — 86922 COMPATIBILITY TEST ANTIGLOB: CPT

## 2024-07-12 PROCEDURE — 93005 ELECTROCARDIOGRAM TRACING: CPT

## 2024-07-12 PROCEDURE — 81001 URINALYSIS AUTO W/SCOPE: CPT

## 2024-07-12 PROCEDURE — 86880 COOMBS TEST DIRECT: CPT

## 2024-07-12 PROCEDURE — 85610 PROTHROMBIN TIME: CPT

## 2024-07-12 PROCEDURE — 82330 ASSAY OF CALCIUM: CPT

## 2024-07-12 PROCEDURE — 97164 PT RE-EVAL EST PLAN CARE: CPT

## 2024-07-12 PROCEDURE — 71045 X-RAY EXAM CHEST 1 VIEW: CPT

## 2024-07-12 PROCEDURE — 83880 ASSAY OF NATRIURETIC PEPTIDE: CPT

## 2024-07-12 PROCEDURE — C9399: CPT

## 2024-07-12 PROCEDURE — 80053 COMPREHEN METABOLIC PANEL: CPT

## 2024-07-12 PROCEDURE — 80048 BASIC METABOLIC PNL TOTAL CA: CPT

## 2024-07-12 PROCEDURE — 84100 ASSAY OF PHOSPHORUS: CPT

## 2024-07-12 PROCEDURE — 83605 ASSAY OF LACTIC ACID: CPT

## 2024-07-12 PROCEDURE — 97110 THERAPEUTIC EXERCISES: CPT

## 2024-07-12 PROCEDURE — C1769: CPT

## 2024-07-12 PROCEDURE — 97161 PT EVAL LOW COMPLEX 20 MIN: CPT

## 2024-07-12 PROCEDURE — 36430 TRANSFUSION BLD/BLD COMPNT: CPT

## 2024-07-12 PROCEDURE — 82803 BLOOD GASES ANY COMBINATION: CPT

## 2024-07-12 PROCEDURE — C1898: CPT

## 2024-07-12 PROCEDURE — 36415 COLL VENOUS BLD VENIPUNCTURE: CPT

## 2024-07-12 PROCEDURE — 88300 SURGICAL PATH GROSS: CPT

## 2024-07-12 PROCEDURE — 93325 DOPPLER ECHO COLOR FLOW MAPG: CPT

## 2024-07-12 PROCEDURE — 93308 TTE F-UP OR LMTD: CPT

## 2024-07-12 PROCEDURE — 85396 CLOTTING ASSAY WHOLE BLOOD: CPT

## 2024-07-12 PROCEDURE — 85018 HEMOGLOBIN: CPT

## 2024-07-12 PROCEDURE — 86901 BLOOD TYPING SEROLOGIC RH(D): CPT

## 2024-07-12 PROCEDURE — 85025 COMPLETE CBC W/AUTO DIFF WBC: CPT

## 2024-07-12 PROCEDURE — 85384 FIBRINOGEN ACTIVITY: CPT

## 2024-07-12 PROCEDURE — 85027 COMPLETE CBC AUTOMATED: CPT

## 2024-07-12 PROCEDURE — 82962 GLUCOSE BLOOD TEST: CPT

## 2024-07-12 PROCEDURE — 84134 ASSAY OF PREALBUMIN: CPT

## 2024-07-12 PROCEDURE — 94010 BREATHING CAPACITY TEST: CPT

## 2024-07-12 PROCEDURE — 86905 BLOOD TYPING RBC ANTIGENS: CPT

## 2024-07-12 PROCEDURE — 86022 PLATELET ANTIBODIES: CPT

## 2024-07-12 PROCEDURE — P9073: CPT

## 2024-07-12 PROCEDURE — 83036 HEMOGLOBIN GLYCOSYLATED A1C: CPT

## 2024-07-12 PROCEDURE — 93880 EXTRACRANIAL BILAT STUDY: CPT

## 2024-07-12 PROCEDURE — 87640 STAPH A DNA AMP PROBE: CPT

## 2024-07-12 PROCEDURE — 93320 DOPPLER ECHO COMPLETE: CPT

## 2024-07-12 PROCEDURE — 84484 ASSAY OF TROPONIN QUANT: CPT

## 2024-07-12 PROCEDURE — 82553 CREATINE MB FRACTION: CPT

## 2024-07-12 PROCEDURE — 87641 MR-STAPH DNA AMP PROBE: CPT

## 2024-07-12 PROCEDURE — 85730 THROMBOPLASTIN TIME PARTIAL: CPT

## 2024-07-12 PROCEDURE — P9016: CPT

## 2024-07-12 PROCEDURE — 83735 ASSAY OF MAGNESIUM: CPT

## 2024-07-12 PROCEDURE — 82550 ASSAY OF CK (CPK): CPT

## 2024-07-12 PROCEDURE — 93456 R HRT CORONARY ARTERY ANGIO: CPT

## 2024-07-12 PROCEDURE — 97116 GAIT TRAINING THERAPY: CPT

## 2024-07-12 PROCEDURE — 94002 VENT MGMT INPAT INIT DAY: CPT

## 2024-07-12 PROCEDURE — 85014 HEMATOCRIT: CPT

## 2024-07-12 PROCEDURE — 88305 TISSUE EXAM BY PATHOLOGIST: CPT

## 2024-07-12 PROCEDURE — P9037: CPT

## 2024-07-12 RX ORDER — METOPROLOL TARTRATE 50 MG
1 TABLET ORAL
Qty: 90 | Refills: 0
Start: 2024-07-12 | End: 2024-08-10

## 2024-07-12 RX ORDER — WARFARIN SODIUM 4 MG/1
1 TABLET ORAL
Qty: 30 | Refills: 0
Start: 2024-07-12 | End: 2024-08-10

## 2024-07-12 RX ORDER — PANTOPRAZOLE SODIUM 40 MG/10ML
1 INJECTION, POWDER, FOR SOLUTION INTRAVENOUS
Qty: 30 | Refills: 0
Start: 2024-07-12 | End: 2024-08-10

## 2024-07-12 RX ORDER — HEPARIN SODIUM 50 [USP'U]/ML
1050 INJECTION, SOLUTION INTRAVENOUS
Qty: 25000 | Refills: 0 | Status: DISCONTINUED | OUTPATIENT
Start: 2024-07-12 | End: 2024-07-12

## 2024-07-12 RX ORDER — AMIODARONE HYDROCHLORIDE 50 MG/ML
1 INJECTION, SOLUTION INTRAVENOUS
Qty: 60 | Refills: 0
Start: 2024-07-12 | End: 2024-08-10

## 2024-07-12 RX ORDER — WARFARIN SODIUM 4 MG/1
3 TABLET ORAL ONCE
Refills: 0 | Status: DISCONTINUED | OUTPATIENT
Start: 2024-07-12 | End: 2024-07-12

## 2024-07-12 RX ADMIN — Medication 25 MILLIGRAM(S): at 13:10

## 2024-07-12 RX ADMIN — HEPARIN SODIUM 10.5 UNIT(S)/HR: 50 INJECTION, SOLUTION INTRAVENOUS at 07:32

## 2024-07-12 RX ADMIN — ASPIRIN 81 MILLIGRAM(S): 325 TABLET, FILM COATED ORAL at 13:10

## 2024-07-12 RX ADMIN — AMIODARONE HYDROCHLORIDE 400 MILLIGRAM(S): 50 INJECTION, SOLUTION INTRAVENOUS at 13:10

## 2024-07-12 RX ADMIN — Medication 3 MILLILITER(S): at 13:15

## 2024-07-12 RX ADMIN — AMIODARONE HYDROCHLORIDE 400 MILLIGRAM(S): 50 INJECTION, SOLUTION INTRAVENOUS at 05:42

## 2024-07-12 RX ADMIN — PANTOPRAZOLE SODIUM 40 MILLIGRAM(S): 40 INJECTION, POWDER, FOR SOLUTION INTRAVENOUS at 05:42

## 2024-07-12 RX ADMIN — Medication 25 MILLIGRAM(S): at 05:42

## 2024-07-12 RX ADMIN — Medication 3 MILLILITER(S): at 05:49

## 2024-07-12 NOTE — DISCHARGE NOTE PROVIDER - NSDCMRMEDTOKEN_GEN_ALL_CORE_FT
amiodarone 200 mg oral tablet: 1 tab(s) orally 2 times a day  aspirin 81 mg oral delayed release tablet: 1 tab(s) orally once a day  atorvastatin 20 mg oral tablet: 1 tab(s) orally once a day (at bedtime)  Blood Work PT/INR STAT: Please have your PT/INR completed on Monday 7/15/24. Call office when preformed  Flomax 0.4 mg oral capsule: 1 cap(s) orally once a day (in the evening)  metoprolol tartrate 25 mg oral tablet: 1 tab(s) orally 3 times a day  pantoprazole 40 mg oral delayed release tablet: 1 tab(s) orally once a day  warfarin 3 mg oral tablet: 1 tab(s) orally once a day (at bedtime)

## 2024-07-12 NOTE — DISCHARGE NOTE NURSING/CASE MANAGEMENT/SOCIAL WORK - PATIENT PORTAL LINK FT
You can access the FollowMyHealth Patient Portal offered by [facility] by registering at the following website: [email or web address]. By joining [email or web address]’s FollowMyHealth portal, you will also be able to view your health information using other applications (apps) compatible with our system.

## 2024-07-12 NOTE — DISCHARGE NOTE PROVIDER - NSDCFUADDINST_GEN_ALL_CORE_FT
Resume activity as tolerated  Resume low cholesterol low sodium diet  Shower daily and wash all incisions with soap and water  Ambulate at least four times daily  Use incentive spirometer every hour during the day  Record daily weight and report changes greater than 3lbs  Please follow up with your cardiologist in 7-10 days  Please follow up with Dr Westbrook in 7-10 days   will not need anticoagulation after the three months post MVR.   Continue amiodarone and metoprolol  Follow up with Dr. Calderon as outpatient for possible ablation (Appt 8/14/24 at 1:45pm).  Blood draw for  INR level. Results to cardiologist; Dr Mane Resume activity as tolerated  Resume low cholesterol low sodium diet  Shower daily and wash all incisions with soap and water  Ambulate at least four times daily  Use incentive spirometer every hour during the day  Record daily weight and report changes greater than 3lbs  Please follow up with your cardiologist in 7-10 days  Please follow up with Dr Westbrook in 7-10 days   will not need anticoagulation after the three months post MVR.   Continue amiodarone and metoprolol  Follow up with Dr. Calderon as outpatient for possible ablation (Appt 8/14/24 at 1:45pm).  Blood draw for  INR level. Results to cardiologist; Dr Mane or PCP  Dr Herb Osorio

## 2024-07-12 NOTE — PROGRESS NOTE ADULT - ASSESSMENT
64 year old male with PMHx of HTN, HLD, BPH, hx of PVCs, BPH, hx of severe mitral regurgitation (s/p mitral valve repair in 2016), hx of tricuspid regurgitation (s/p TV repair in 2016), hx of atrial fibrillation (s/p Maze procedure in 2016 and s/p ablation in 2017), s/p reoperative sternotomy Mitral valve replacement with 27 Mitris and epicardial LV lead placement on June 12th 2024 with Dr. Westbrook. Pt was discharged on 200mg Amio QD and Lopressor 25mg BID. He presents to the CT surgery office on 7/11 after noticing on his apple watch that his HR was in the 170s for the past two days, found to be in atrial tachycardia and admitted for possible cardioversion.     Dx: s/p recent redo MVR and Epicardial LV Lead placement with post op ATach     Recommendation:    - Pt converted to SR overnight, HR 60-70's  - DCCV not needed as pt converted to SR  - On Heparin/Coumadin bridge, INR 1.90 on 7/11  - His UTE is closed so he will not need OAC after the three months post MVR.   - Continue amiodarone and metoprolol  - Follow up with Dr. Cadleron as outpatient for possible ablation (Appt 8/14/24 at 1:45pm)  - Plan discussed with primary team.     DAQUAN Archuleta NP-CHILANGO  Can reach me via Teams        64 year old male with PMHx of HTN, HLD, BPH, hx of PVCs, BPH, hx of severe mitral regurgitation (s/p mitral valve repair in 2016), hx of tricuspid regurgitation (s/p TV repair in 2016), hx of atrial fibrillation (s/p Maze procedure in 2016 and s/p ablation in 2017), s/p reoperative sternotomy Mitral valve replacement with 27 Mitris and epicardial LV lead placement on June 12th 2024 with Dr. Westbrook. Pt was discharged on 200mg Amio QD and Lopressor 25mg BID. He presents to the CT surgery office on 7/11 after noticing on his apple watch that his HR was in the 170s for the past two days, found to be in atrial tachycardia and admitted for possible cardioversion.     Dx: s/p recent redo MVR and Epicardial LV Lead placement with post op ATach     Recommendation:    - Pt converted to SR overnight, HR 60-70's  - DCCV not needed as pt converted to SR  - On Heparin/Coumadin bridge, INR 1.90 on 7/11  - His UTE is closed so he will not need OAC after the three months post MVR.   - Continue amiodarone and metoprolol  - Follow up with Dr. Calderon as outpatient for possible ablation (Appt 8/14/24 at 1:45pm)  - EP will sign off, reconsult as needed  - Plan discussed with primary team and Dr. Calderon.     DAQUAN Archuleta NP-CHILANGO  Can reach me via Teams

## 2024-07-12 NOTE — PROGRESS NOTE ADULT - SUBJECTIVE AND OBJECTIVE BOX
24H hour events: Pt without complaint, no acute events overnight, Tele: SR 60-70's, had PAT overnight with HR in the 100's     MEDICATIONS:  aMIOdarone    Tablet 400 milliGRAM(s) Oral every 8 hours  aspirin  chewable 81 milliGRAM(s) Oral daily  heparin  Infusion 1050 Unit(s)/Hr IV Continuous <Continuous>  metoprolol tartrate 25 milliGRAM(s) Oral three times a day  pantoprazole    Tablet 40 milliGRAM(s) Oral before breakfast  atorvastatin 20 milliGRAM(s) Oral at bedtime  sodium chloride 0.9% lock flush 3 milliLiter(s) IV Push every 8 hours  tamsulosin 0.4 milliGRAM(s) Oral at bedtime      REVIEW OF SYSTEMS:  Complete 12 point ROS negative.    PHYSICAL EXAM:  T(C): 36.7 (07-12-24 @ 04:23), Max: 36.7 (07-11-24 @ 13:47)  HR: 63 (07-12-24 @ 04:23) (63 - 95)  BP: 135/92 (07-12-24 @ 04:23) (118/80 - 135/92)  RR: 18 (07-12-24 @ 04:23) (18 - 18)  SpO2: 98% (07-12-24 @ 04:23) (91% - 98%)  Wt(kg): --  I&O's Summary    11 Jul 2024 07:01  -  12 Jul 2024 07:00  --------------------------------------------------------  IN: 417 mL / OUT: 120 mL / NET: 297 mL    12 Jul 2024 07:01  -  12 Jul 2024 09:59  --------------------------------------------------------  IN: 21 mL / OUT: 0 mL / NET: 21 mL        Appearance: Normal	  HEENT: PERRL, EOMI	  Cardiovascular: Normal S1 S2, RRR, No JVD, No murmurs  Respiratory: Lungs clear to auscultation	  Psychiatry: A & O x 3, Mood & affect appropriate  Gastrointestinal: Soft, Non-tender, + BS	  Skin: No rashes, No ecchymoses, No cyanosis	  Neurologic: Grossly intact  Extremities: No clubbing, cyanosis or edema  Vascular: Peripheral pulses palpable 2+ bilaterally        LABS:	 	    CBC Full  -  ( 12 Jul 2024 03:13 )  WBC Count : 6.58 K/uL  Hemoglobin : 8.4 g/dL  Hematocrit : 25.8 %  Platelet Count - Automated : 203 K/uL  Mean Cell Volume : 81.4 fl  Mean Cell Hemoglobin : 26.5 pg  Mean Cell Hemoglobin Concentration : 32.6 gm/dL    07-12    140  |  104  |  16  ----------------------------<  94  4.0   |  25  |  1.41<H>  07-11    139  |  102  |  13  ----------------------------<  87  4.3   |  25  |  1.34<H>    Ca    8.8      12 Jul 2024 03:13  Ca    9.4      11 Jul 2024 13:57  Phos  3.6     07-12  Mg     2.2     07-12    TPro  8.0  /  Alb  4.1  /  TBili  0.4  /  DBili  x   /  AST  26  /  ALT  19  /  AlkPhos  82  07-11    Thyroid Stimulating Hormone, Serum (07.11.24 @ 13:57)    Thyroid Stimulating Hormone, Serum: 5.46 uIU/mL        TELEMETRY: SR 60-70's, had PAT overnight with HR in the 100's  	      < from: TTE Limited W or WO Ultrasound Enhancing Agent (06.16.24 @ 15:31) >  CONCLUSIONS:      1. Left ventricular systolic function is normal with an ejection fraction visually estimated at 50 to 55 %.   2. Normal right ventricular cavity size, with normal wall thickness, and normal systolic function.   3. Nopericardial effusion seen.   4. An annuloplasty ring is noted in the tricuspid position..Moderate valvular intravalvular regurgitation of the tricuspid valve prosthesis. No paravalvular regurgitation.   5. Bioprosthetic valve present. in the mitral position. Well seated prosthetic mitral valve with normal function with normal leaflet excursion. There is trace intravalvular mitral regurgitation. No paravalvular mitral regurgitation.   6. Compared to the transthoracic echocardiogram performed on 6/16/2024, there have been no significant interval changes.    ________________________________________________________________________________________  FINDINGS:     Left Ventricle:  Left ventricular systolic function is normal with an ejection fraction visually estimated at 50 to 55%. Unable to assess left ventricular diastolic function due to insufficient data. Analysis of left ventricular diastolic function and filling pressure is made challenging by the presence of prosthetic mitral valve.     Right Ventricle:  The right ventricular cavity is normal in size, with normal wall thickness and normal systolic function.     Aortic Valve:  The aortic valve is tricuspid with normal leaflet excursion. There is no aortic valve stenosis. There is trace aortic regurgitation.     Mitral Valve:  Bioprosthetic valve is present in the mitral position. The mitral valve prosthesis is well seated with normal function with normal leaflet excursion. Trace intravalvular mitral regurgitation. There is no paravalvular regurgitation.     Tricuspid Valve:  An annuloplasty ring is noted in the tricuspid position. There is moderate intravalvular regurgitation of the tricuspid valve prosthesis. There is no paravalvular regurgitation.     Pericardium:  No pericardial effusion seen.  ____________________________________________________________________  QUANTITATIVE DATA:  Left Ventricle Measurements: (Indexed to BSA)     Visualized LV EF%: 50 to 55%      < end of copied text >  
VITAL SIGNS    Telemetry:  afib -> SR 60 this am  Vital Signs Last 24 Hrs  T(C): 36.7 (24 @ 04:23), Max: 36.7 (24 @ 13:47)  T(F): 98.1 (24 @ 04:23), Max: 98.1 (24 @ 13:47)  HR: 63 (24 @ 04:23) (63 - 80)  BP: 135/92 (24 @ 04:23) (118/80 - 135/92)  RR: 18 (24 @ 04:23) (18 - 18)  SpO2: 98% (24 @ 04:23) (97% - 98%)             @ 07:01  -   @ 07:00  --------------------------------------------------------  IN: 417 mL / OUT: 120 mL / NET: 297 mL     @ 07:01  -   @ 12:32  --------------------------------------------------------  IN: 31.5 mL / OUT: 0 mL / NET: 31.5 mL       Daily Height in cm: 170.18 (2024 23:04)    Daily Weight in k.9 (2024 08:19)  Admit Wt: Drug Dosing Weight  Height (cm): 170.2 (2024 23:06)  Weight (kg): 75.6 (2024 23:06)  BMI (kg/m2): 26.1 (2024 23:06)  BSA (m2): 1.87 (2024 23:06)    Bilirubin Total: 0.4 mg/dL ( @ 13:57)    CAPILLARY BLOOD GLUCOSE              MEDICATIONS  aMIOdarone    Tablet 400 milliGRAM(s) Oral every 8 hours  aMIOdarone    Tablet   Oral   aspirin  chewable 81 milliGRAM(s) Oral daily  atorvastatin 20 milliGRAM(s) Oral at bedtime  heparin  Infusion 1050 Unit(s)/Hr IV Continuous <Continuous>  metoprolol tartrate 25 milliGRAM(s) Oral three times a day  pantoprazole    Tablet 40 milliGRAM(s) Oral before breakfast  sodium chloride 0.9% lock flush 3 milliLiter(s) IV Push every 8 hours  tamsulosin 0.4 milliGRAM(s) Oral at bedtime      >>> <<<  PHYSICAL EXAM  Subjective: NAD  Neurology: alert and oriented x 3, nonfocal, no gross deficits  CV : s1s2  Sternal Wound :  CDI , Stable  Lungs: CTA  Abdomen: soft, NT,ND, ( )BM  :  voiding  Extremities:       LABS      140  |  104  |  16  ----------------------------<  94  4.0   |  25  |  1.41<H>    Ca    8.8      2024 03:13  Phos  3.6       Mg     2.2         TPro  8.0  /  Alb  4.1  /  TBili  0.4  /  DBili  x   /  AST  26  /  ALT  19  /  AlkPhos  82  07-11                                 8.4    6.58  )-----------( 203      ( 2024 03:13 )             25.8          PT/INR - ( 2024 05:45 )   PT: 20.9 sec;   INR: 2.03 ratio         PTT - ( 2024 05:45 )  PTT:56.5 sec       PAST MEDICAL & SURGICAL HISTORY:  Mitral insufficiency      BPH (benign prostatic hyperplasia)      HTN (hypertension)      HLD (hyperlipidemia)      History of atrial fibrillation      H/O mitral valve repair      H/O tricuspid valve repair      S/P Maze operation for atrial fibrillation      S/P ablation of atrial fibrillation      S/P mitral valve replacement

## 2024-07-12 NOTE — DISCHARGE NOTE PROVIDER - PROVIDER TOKENS
PROVIDER:[TOKEN:[[id number]],FOLLOWUP:[Routine]],PROVIDER:[TOKEN:[[id number]],FOLLOWUP:[1 week]]

## 2024-07-12 NOTE — DISCHARGE NOTE NURSING/CASE MANAGEMENT/SOCIAL WORK - NSDCPEFALRISK_GEN_ALL_CORE
For information on Fall & Injury Prevention, visit: [email or web address] OR  [email or web address] OR  https://www.cdc.gov/steadi/patient.html

## 2024-07-12 NOTE — DISCHARGE NOTE PROVIDER - CARE PROVIDER_API CALL
Brian Westbrook  Thoracic and Cardiac Surgery  [address] [place] [id number]  Phone: [phone]  Fax: [phone]  Follow Up Time: Routine    Kenia Valentino  Interventional Cardiology  [address] [place] [id number]  Phone: [phone]  Fax: [phone]  Follow Up Time: 1 week

## 2024-07-12 NOTE — DISCHARGE NOTE PROVIDER - NSDCFUSCHEDAPPT_GEN_ALL_CORE_FT
Dm Calderon  [facility]  ELECTROPH [address]  Scheduled Appointment: 08/14/2024

## 2024-07-12 NOTE — PROGRESS NOTE ADULT - ASSESSMENT
64 year old male with PMHx of HTN, HLD, BPH, hx of PVCs, hx of severe mitral regurgitation (s/p mitral valve repair in 2016), hx of tricuspid regurgitation (s/p TV repair in 2016), hx of atrial fibrillation (s/p Maze procedure in 2016 and s/p ablation in 2017), s/p Reoperative sternotomy Mitral valve replacement with 27 Mitris Epicardial LV lead placement on June 12th 2024 with Dr. Westbrook. Pt was discharged on 200mg Amio QD and Lop 25mg BID. Today presents to the CT surgery office after noticing on his apple watch that his HR was in the 170s for the past two days. Pt was found to be in afib and admitted for possible cardioversion with EP service. Denies CP, palpitation, SOB, ARMSTRONG, HA, dizziness, N/V/D, fever or chills at this time.       7/11 VSS Afib 's. Lop 25 increased to TID. EP consulted for possible cardioversion.    7/12 Pt converted to SR overnight, HR 60-70's. DCCV not needed as pt converted to SR  On Heparin/Coumadin bridge, INR 2.03. Heparin discontinued UTE is closed so he will not need OAC after the three months post MVR. Continue amiodarone and metoprolol. Dose coumadin

## 2024-07-12 NOTE — DISCHARGE NOTE PROVIDER - HOSPITAL COURSE
64 year old male with PMHx of HTN, HLD, BPH, hx of PVCs, hx of severe mitral regurgitation (s/p mitral valve repair in 2016), hx of tricuspid regurgitation (s/p TV repair in 2016), hx of atrial fibrillation (s/p Maze procedure in 2016 and s/p ablation in 2017), s/p Reoperative sternotomy Mitral valve replacement with 27 Mitris Epicardial LV lead placement on June 12th 2024 with Dr. Westbrook. Pt was discharged on 200mg Amio QD and Lop 25mg BID. Today presents to the CT surgery office after noticing on his apple watch that his HR was in the 170s for the past two days. Pt was found to be in afib and admitted for possible cardioversion with EP service. Denies CP, palpitation, SOB, ARMSTRONG, HA, dizziness, N/V/D, fever or chills at this time.       7/11 VSS Afib 's. Lop 25 increased to TID. EP consulted for possible cardioversion.    7/12 Pt converted to SR overnight, HR 60-70's. DCCV not needed as pt converted to SR  On Heparin/Coumadin bridge, INR 2.03. Heparin discontinued UTE is closed so he will not need OAC after the three months post MVR. Continue amiodarone and metoprolol. Dose coumadin. Pt discharged today 64 year old male with PMHx of HTN, HLD, BPH, hx of PVCs, hx of severe mitral regurgitation (s/p mitral valve repair in 2016), hx of tricuspid regurgitation (s/p TV repair in 2016), hx of atrial fibrillation (s/p Maze procedure in 2016 and s/p ablation in 2017), s/p Reoperative sternotomy Mitral valve replacement with 27 Mitris Epicardial LV lead placement on June 12th 2024 with Dr. Westbrook. Pt was discharged on 200mg Amio QD and Lop 25mg BID. Today presents to the CT surgery office after noticing on his apple watch that his HR was in the 170s for the past two days. Pt was found to be in afib and admitted for possible cardioversion with EP service. Denies CP, palpitation, SOB, ARMSTRONG, HA, dizziness, N/V/D, fever or chills at this time.       7/11 VSS Afib 's. Lop 25 increased to TID. EP consulted for possible cardioversion.    7/12 Pt converted to SR overnight, HR 60-70's. DCCV not needed as pt converted to SR  On Heparin/Coumadin bridge, INR 2.03. Heparin discontinued UTE is closed so he will not need OAC after the three months post MVR. Continue amiodarone and metoprolol. Dose coumadin. Pt discharged today INR check with Dr Herb Osorio

## 2024-07-12 NOTE — PROGRESS NOTE ADULT - PROBLEM SELECTOR PLAN 1
Coumadin bridge, INR 2.03. Heparin discontinued  UTE is closed so he will not need OAC after the three months post MVR.   Continue amiodarone and metoprolol  Follow up with Dr. Calderon as outpatient for possible ablation (Appt 8/14/24 at 1:45pm)

## 2024-07-12 NOTE — DISCHARGE NOTE NURSING/CASE MANAGEMENT/SOCIAL WORK - NSDCVIVACCINE_GEN_ALL_CORE_FT
influenza, injectable, quadrivalent, preservative free; 07-Dec-2017 09:32; Herb Silva (RN); [employer]; [id number]; IntraMuscular; Deltoid Left.; 0.5 milliLiter(s); VIS (VIS Published: 07-Aug-2015, VIS Presented: 07-Dec-2017);

## 2024-07-12 NOTE — DISCHARGE NOTE PROVIDER - CARE PROVIDERS DIRECT ADDRESSES
,DirectAddress_Unknown,[email or web address]

## 2024-07-13 ENCOUNTER — TRANSCRIPTION ENCOUNTER (OUTPATIENT)
Age: 64
End: 2024-07-13

## 2024-07-16 ENCOUNTER — NON-APPOINTMENT (OUTPATIENT)
Age: 64
End: 2024-07-16

## 2024-07-16 ENCOUNTER — APPOINTMENT (OUTPATIENT)
Dept: CARDIOLOGY | Facility: CLINIC | Age: 64
End: 2024-07-16
Payer: COMMERCIAL

## 2024-07-16 VITALS
WEIGHT: 172.2 LBS | SYSTOLIC BLOOD PRESSURE: 108 MMHG | BODY MASS INDEX: 27.03 KG/M2 | HEART RATE: 120 BPM | DIASTOLIC BLOOD PRESSURE: 64 MMHG | OXYGEN SATURATION: 98 % | HEIGHT: 67 IN

## 2024-07-16 DIAGNOSIS — I51.9 HEART DISEASE, UNSPECIFIED: ICD-10-CM

## 2024-07-16 DIAGNOSIS — R94.31 ABNORMAL ELECTROCARDIOGRAM [ECG] [EKG]: ICD-10-CM

## 2024-07-16 DIAGNOSIS — I49.3 VENTRICULAR PREMATURE DEPOLARIZATION: ICD-10-CM

## 2024-07-16 DIAGNOSIS — R00.2 PALPITATIONS: ICD-10-CM

## 2024-07-16 PROCEDURE — 99214 OFFICE O/P EST MOD 30 MIN: CPT | Mod: 25

## 2024-07-16 PROCEDURE — 99496 TRANSJ CARE MGMT HIGH F2F 7D: CPT

## 2024-07-16 PROCEDURE — 93000 ELECTROCARDIOGRAM COMPLETE: CPT | Mod: 59

## 2024-07-16 PROCEDURE — G2211 COMPLEX E/M VISIT ADD ON: CPT | Mod: NC

## 2024-07-17 ENCOUNTER — NON-APPOINTMENT (OUTPATIENT)
Age: 64
End: 2024-07-17

## 2024-07-17 ENCOUNTER — APPOINTMENT (OUTPATIENT)
Dept: CARDIOTHORACIC SURGERY | Facility: CLINIC | Age: 64
End: 2024-07-17
Payer: COMMERCIAL

## 2024-07-17 VITALS
DIASTOLIC BLOOD PRESSURE: 80 MMHG | HEART RATE: 133 BPM | OXYGEN SATURATION: 98 % | TEMPERATURE: 208.4 F | BODY MASS INDEX: 27 KG/M2 | HEIGHT: 67 IN | RESPIRATION RATE: 14 BRPM | WEIGHT: 172 LBS | SYSTOLIC BLOOD PRESSURE: 106 MMHG

## 2024-07-17 DIAGNOSIS — Z95.2 PRESENCE OF PROSTHETIC HEART VALVE: ICD-10-CM

## 2024-07-17 DIAGNOSIS — Z98.890 OTHER SPECIFIED POSTPROCEDURAL STATES: ICD-10-CM

## 2024-07-17 PROCEDURE — 93005 ELECTROCARDIOGRAM TRACING: CPT

## 2024-07-17 PROCEDURE — 99024 POSTOP FOLLOW-UP VISIT: CPT

## 2024-07-19 ENCOUNTER — TRANSCRIPTION ENCOUNTER (OUTPATIENT)
Age: 64
End: 2024-07-19

## 2024-07-23 ENCOUNTER — APPOINTMENT (OUTPATIENT)
Dept: CARDIOTHORACIC SURGERY | Facility: CLINIC | Age: 64
End: 2024-07-23

## 2024-07-25 ENCOUNTER — APPOINTMENT (OUTPATIENT)
Dept: ELECTROPHYSIOLOGY | Facility: CLINIC | Age: 64
End: 2024-07-25
Payer: COMMERCIAL

## 2024-07-25 ENCOUNTER — NON-APPOINTMENT (OUTPATIENT)
Age: 64
End: 2024-07-25

## 2024-07-25 VITALS
OXYGEN SATURATION: 99 % | BODY MASS INDEX: 27 KG/M2 | WEIGHT: 172 LBS | SYSTOLIC BLOOD PRESSURE: 135 MMHG | DIASTOLIC BLOOD PRESSURE: 81 MMHG | HEART RATE: 62 BPM | HEIGHT: 67 IN

## 2024-07-25 DIAGNOSIS — E78.5 HYPERLIPIDEMIA, UNSPECIFIED: ICD-10-CM

## 2024-07-25 DIAGNOSIS — I47.19 OTHER SUPRAVENTRICULAR TACHYCARDIA: ICD-10-CM

## 2024-07-25 DIAGNOSIS — I48.91 UNSPECIFIED ATRIAL FIBRILLATION: ICD-10-CM

## 2024-07-25 PROCEDURE — 99214 OFFICE O/P EST MOD 30 MIN: CPT | Mod: 25

## 2024-07-25 PROCEDURE — 93000 ELECTROCARDIOGRAM COMPLETE: CPT

## 2024-07-25 RX ORDER — METOPROLOL SUCCINATE 100 MG/1
100 TABLET, EXTENDED RELEASE ORAL DAILY
Qty: 90 | Refills: 3 | Status: ACTIVE | COMMUNITY
Start: 2024-07-25 | End: 1900-01-01

## 2024-07-25 RX ORDER — METOPROLOL TARTRATE 50 MG/1
50 TABLET, FILM COATED ORAL TWICE DAILY
Refills: 0 | Status: DISCONTINUED | COMMUNITY
End: 2024-07-25

## 2024-07-25 RX ORDER — AMIODARONE HYDROCHLORIDE 200 MG/1
200 TABLET ORAL
Qty: 90 | Refills: 3 | Status: ACTIVE | COMMUNITY
Start: 2024-07-25 | End: 1900-01-01

## 2024-07-25 NOTE — DISCUSSION/SUMMARY
[EKG obtained to assist in diagnosis and management of assessed problem(s)] : EKG obtained to assist in diagnosis and management of assessed problem(s) [FreeTextEntry1] : Rodney is doing well and maintaining sinus.  Given that he remains within the 3 month post-OHS period, he will remain on amiodarone, warfarin, and metoprolol.  Amiodarone dose reduced from 200mg BID to 200mg daily.  Metoprolol tartrate (50mg BID) switched to metoprolol succinate (100mg daily).  Follow up in 3 months at which time amiodarone will be stopped and if he has more AT, will schedule for ablation

## 2024-07-25 NOTE — HISTORY OF PRESENT ILLNESS
[FreeTextEntry1] : Mr. Rodney Dasilva is a 64-year-old male with a history of HTN, HLD, BPH, PVCs, BPH, severe MR (s/p mitral valve repair in 2016), TR (s/p TV repair in 2016), atrial fibrillation (s/p MAZE procedure in 2016 and s/p ablation in 2017), s/p recent re-op sternotomy with mitral valve replacement (27 Mitris) and epicardial LV lead placement (not attached to a generator) on 6/12/24 with Dr. Westbrook.  Intra-op KEVIN showed AtriCure device present at the base of the left atrial appendage which is positioned normally, with no residual flow.  He was discharged on amiodarone (200mg BID) and metoprolol tartrate (25mg BID).  In a follow-up appointment with [facility] on 7/11, he was found to be in AT, for which he was admitted with a plan for DCCV.  However, he self-converted to sinus overnight and therefore did not undergo cardioversion.    He presents for follow-up today.  Overall, he has been feeling well and denies any chest pain, palpitations, dyspnea, lightheadedness/dizziness, near syncope, or syncope.  EKG shows sinus rhythm at 62bpm.

## 2024-07-25 NOTE — REASON FOR VISIT
[Arrhythmia/ECG Abnorrmalities] : arrhythmia/ECG abnormalities [FreeTextEntry3] : Dr. Kenia Valentino

## 2024-09-23 NOTE — H&P CARDIOLOGY - GASTROINTESTINAL
[FreeTextEntry1] : We reviewed the sonogram which showed 1.9mm lining, which is reassuring. We discussed options of repeat D&C hysteroscopy to eval for hyperplasia/malignancy, but that in the setting of 1.9mm lining this will likely have negative/benign findings. We discussed the benefit of obtaining tissue sample vs. the risks of surgery. At this time we decided together to defer repeat D&C unless she has further bleeding. She will RTO in 12 weeks for repeat sonogram to recheck endometrium. If she would like to proceed with D&C instead she can call the office to schedule.  Soft, non-tender, no hepatosplenomegaly, normal bowel sounds

## 2024-09-24 ENCOUNTER — NON-APPOINTMENT (OUTPATIENT)
Age: 64
End: 2024-09-24

## 2024-09-25 ENCOUNTER — APPOINTMENT (OUTPATIENT)
Dept: ELECTROPHYSIOLOGY | Facility: CLINIC | Age: 64
End: 2024-09-25
Payer: COMMERCIAL

## 2024-09-25 ENCOUNTER — NON-APPOINTMENT (OUTPATIENT)
Age: 64
End: 2024-09-25

## 2024-09-25 VITALS
HEART RATE: 51 BPM | BODY MASS INDEX: 26.16 KG/M2 | OXYGEN SATURATION: 99 % | SYSTOLIC BLOOD PRESSURE: 162 MMHG | DIASTOLIC BLOOD PRESSURE: 84 MMHG | WEIGHT: 167 LBS

## 2024-09-25 DIAGNOSIS — Z95.2 PRESENCE OF PROSTHETIC HEART VALVE: ICD-10-CM

## 2024-09-25 DIAGNOSIS — Z98.890 OTHER SPECIFIED POSTPROCEDURAL STATES: ICD-10-CM

## 2024-09-25 DIAGNOSIS — R00.2 PALPITATIONS: ICD-10-CM

## 2024-09-25 DIAGNOSIS — I47.19 OTHER SUPRAVENTRICULAR TACHYCARDIA: ICD-10-CM

## 2024-09-25 DIAGNOSIS — I48.91 UNSPECIFIED ATRIAL FIBRILLATION: ICD-10-CM

## 2024-09-25 PROCEDURE — 99215 OFFICE O/P EST HI 40 MIN: CPT | Mod: 25

## 2024-09-25 PROCEDURE — 93000 ELECTROCARDIOGRAM COMPLETE: CPT

## 2024-09-26 NOTE — HISTORY OF PRESENT ILLNESS
[FreeTextEntry1] : Mr. Rodney Dasilva is a 64-year-old male with a history of HTN, HLD, BPH, PVCs, BPH, severe MR (s/p mitral valve repair in 2016), TR (s/p TV repair in 2016), atrial fibrillation (s/p MAZE procedure in 2016 and s/p ablation in 2017), s/p recent re-op sternotomy with mitral valve replacement (27 Mitris) and epicardial LV lead placement (not attached to a generator) on 6/12/24 with Dr. Westbrook. Intra-op KEVIN showed AtriCure device present at the base of the left atrial appendage which is positioned normally, with no residual flow. He was discharged on amiodarone (200mg BID) and metoprolol tartrate (25mg BID). In a follow-up appointment with [facility] on 7/11, he was found to be in AT, for which he was admitted with a plan for DCCV. However, he self-converted to sinus overnight and therefore did not undergo cardioversion.  He has continued with 200 mg po amiodarone with no further arrhythmias. Echo in June showed normal LVEF 55% with moderate intravalvular TR (ring), MV bioprosthetic valve normal.

## 2024-09-26 NOTE — DISCUSSION/SUMMARY
[FreeTextEntry1] : He is now > 3months post OHS and is no longer having any symptoms. I have asked him to stop amiodarone. ECG shows sinus bradycardia at 52 bpm. He will remain on 100 mg metoprolol succinate but if he is doing well when he sees Dr. Christian in October, metoprolol could be decreased to 50 mg daily but I would recommend a 2 week outpatient monitor to confirm lack of any atrial arrhythmias. Chetan enrique does not need chronic OAC having an Atriclip; however, it may be worthwhile to check a CT to make sure the appendage has remained closed.. [EKG obtained to assist in diagnosis and management of assessed problem(s)] : EKG obtained to assist in diagnosis and management of assessed problem(s)

## 2024-09-26 NOTE — END OF VISIT
[] : 4 [Time Spent: ___ minutes] : I have spent [unfilled] minutes of time on the encounter which excludes teaching and separately reported services.

## 2024-09-26 NOTE — REASON FOR VISIT
[Arrhythmia/ECG Abnorrmalities] : arrhythmia/ECG abnormalities [FreeTextEntry3] : Kenia Valentino MD [FreeTextEntry1] : - Summary : A 64-year-old male patient who underwent a mitral valve replacement and epicardial electrical lead replacement almost three months ago is presenting for a routine check-up. The patient reports no palpitations and is currently on amiodarone and metoprolol due to post-op atrial tachycardia. - Chief Complaint (CC) : Routine post-operative follow-up. - History of Present Illness : Mr. Rodney Heredia, a 64-year-old male who underwent a sternotomy with mitral valve replacement and an epicardial electrical lead replacement almost three months ago, presents today for a routine follow-up. Mr. Heredia claims he has not been experiencing palpitations. He is currently on amiodarone 200mg daily and metoprolol as treatment for postoperative atrial tachycardia. - Past Medical History : Mr. Heredia has a previous history of tricuspid valve replacement and more recently, a reduction sternotomy with mitral valve replacement and left atrial appendage occlusion with an AtriCure Clip. - Past Surgical History : Previously the patient underwent tricuspid valve replacement and mitral valve replacement operations. Most recently, in addition to the mitral valve replacement, an epicardial electrical lead replacement was performed which is currently not attached to a generator, and a left atrial appendage occlusion using an AtriCure Clip. - Family History : Not mentioned in the conversation - Social History : - Patient is currently employed  - Review of Systems : General: Patient denies any weight changes, fatigue, or fever. Neurological: No reports of headache, dizziness, or any changes in coordination, sensation or vision. Musculoskeletal: No complaints of joint pain, stiffness, or limited range of motion. Cardiovascular: Patient denies experiencing palpitations. Respiratory: Patient's lungs are clear. Gastrointestinal: Not mentioned in the conversation Genitourinary: Not mentioned in the conversation Integumentary: Not mentioned in the conversation Psychiatric: Not mentioned in the conversation - Medications : - Amiodarone 200mg once daily  - Metoprolol 100mg once daily  - Lipitor 20mg once daily  - Aspirin 81mg once daily  - Prostate medication  - Allergies : patient did not report any allergies. Objective: - Diagnostic Results : Not mentioned in the conversation - Vital Signs : Patient's vitals were not explicitly stated, only mentioned that blood pressure is 'overall okay but runs a little bit higher'. - Physical Examination (PE) : Physical examination revealed that the lungs are clear, carotid upstrokes are regular, no abnormalities in heart sounds, and there was a slight swelling in the ankles. Assessment and Plan: - Post-operative Condition : The patient appears to be in stable condition post-operation. No complications were reported and the physical examination concluded with positive results. Patient denies palpitation episodes since a few months ago. - Therapeutic Interventions: I advise stopping the use of Amiodarone, while maintaining the use of Metoprolol for now. Future reduction of the Metoprolol dose may be discussed.  - Diagnostic Tests: None required at this stage.  - Referrals: There is a scheduled follow-up consultation with patient's cardiologist on October 16th.  - Patient Education: I emphasized to the patient the importance of communicating if episodes of palpitations are experienced again.  - Follow-Up: I will review my recommendation of reducing the Metoprolol dose during patient's next consultation.

## 2024-09-26 NOTE — HISTORY OF PRESENT ILLNESS
[FreeTextEntry1] : Mr. Rodney Dasilva is a 64-year-old male with a history of HTN, HLD, BPH, PVCs, BPH, severe MR (s/p mitral valve repair in 2016), TR (s/p TV repair in 2016), atrial fibrillation (s/p MAZE procedure in 2016 and s/p ablation in 2017), s/p recent re-op sternotomy with mitral valve replacement (27 Mitris) and epicardial LV lead placement (not attached to a generator) on 6/12/24 with Dr. Westbrook. Intra-op KEVIN showed AtriCure device present at the base of the left atrial appendage which is positioned normally, with no residual flow. He was discharged on amiodarone (200mg BID) and metoprolol tartrate (25mg BID). In a follow-up appointment with [facility] on 7/11, he was found to be in AT, for which he was admitted with a plan for DCCV. However, he self-converted to sinus overnight and therefore did not undergo cardioversion.  He has continued with 200 mg po amiodarone with no further arrhythmias. Echo in June showed normal LVEF 55% with moderate intravalvular TR (ring), MV bioprosthetic valve normal.

## 2024-10-24 ENCOUNTER — NON-APPOINTMENT (OUTPATIENT)
Age: 64
End: 2024-10-24

## 2024-10-24 ENCOUNTER — APPOINTMENT (OUTPATIENT)
Dept: CARDIOLOGY | Facility: CLINIC | Age: 64
End: 2024-10-24
Payer: COMMERCIAL

## 2024-10-24 VITALS
WEIGHT: 170 LBS | HEIGHT: 67 IN | DIASTOLIC BLOOD PRESSURE: 80 MMHG | BODY MASS INDEX: 26.68 KG/M2 | OXYGEN SATURATION: 98 % | RESPIRATION RATE: 16 BRPM | HEART RATE: 57 BPM | SYSTOLIC BLOOD PRESSURE: 148 MMHG

## 2024-10-24 DIAGNOSIS — R00.2 PALPITATIONS: ICD-10-CM

## 2024-10-24 DIAGNOSIS — E78.5 HYPERLIPIDEMIA, UNSPECIFIED: ICD-10-CM

## 2024-10-24 DIAGNOSIS — I34.0 NONRHEUMATIC MITRAL (VALVE) INSUFFICIENCY: ICD-10-CM

## 2024-10-24 DIAGNOSIS — I48.91 UNSPECIFIED ATRIAL FIBRILLATION: ICD-10-CM

## 2024-10-24 DIAGNOSIS — I51.9 HEART DISEASE, UNSPECIFIED: ICD-10-CM

## 2024-10-24 PROCEDURE — 99214 OFFICE O/P EST MOD 30 MIN: CPT | Mod: 25

## 2024-10-24 PROCEDURE — 93000 ELECTROCARDIOGRAM COMPLETE: CPT

## 2024-11-21 ENCOUNTER — APPOINTMENT (OUTPATIENT)
Dept: CARDIOLOGY | Facility: CLINIC | Age: 64
End: 2024-11-21
Payer: COMMERCIAL

## 2024-11-21 PROCEDURE — 93306 TTE W/DOPPLER COMPLETE: CPT

## 2024-12-02 ENCOUNTER — APPOINTMENT (OUTPATIENT)
Dept: CARDIOLOGY | Facility: CLINIC | Age: 64
End: 2024-12-02

## 2024-12-06 ENCOUNTER — APPOINTMENT (OUTPATIENT)
Dept: CARDIOLOGY | Facility: CLINIC | Age: 64
End: 2024-12-06

## 2024-12-06 ENCOUNTER — NON-APPOINTMENT (OUTPATIENT)
Age: 64
End: 2024-12-06

## 2024-12-06 VITALS
OXYGEN SATURATION: 99 % | RESPIRATION RATE: 16 BRPM | SYSTOLIC BLOOD PRESSURE: 158 MMHG | BODY MASS INDEX: 26.68 KG/M2 | DIASTOLIC BLOOD PRESSURE: 96 MMHG | HEIGHT: 67 IN | HEART RATE: 65 BPM | WEIGHT: 170 LBS

## 2024-12-06 VITALS — SYSTOLIC BLOOD PRESSURE: 154 MMHG | DIASTOLIC BLOOD PRESSURE: 98 MMHG

## 2024-12-06 DIAGNOSIS — I10 ESSENTIAL (PRIMARY) HYPERTENSION: ICD-10-CM

## 2024-12-06 DIAGNOSIS — Z95.2 PRESENCE OF PROSTHETIC HEART VALVE: ICD-10-CM

## 2024-12-06 DIAGNOSIS — I48.91 UNSPECIFIED ATRIAL FIBRILLATION: ICD-10-CM

## 2024-12-06 PROCEDURE — 99215 OFFICE O/P EST HI 40 MIN: CPT | Mod: 25

## 2024-12-06 PROCEDURE — 93000 ELECTROCARDIOGRAM COMPLETE: CPT

## 2024-12-06 RX ORDER — VALSARTAN 80 MG/1
80 TABLET, COATED ORAL DAILY
Qty: 30 | Refills: 0 | Status: ACTIVE | COMMUNITY
Start: 2024-12-06 | End: 1900-01-01

## 2024-12-17 NOTE — ED ADULT TRIAGE NOTE - ARRIVAL FROM
Mild elevation of blood pressure  Patient continues to refuse to take chronic antihypertensive or heart medications   Home

## 2025-01-07 ENCOUNTER — APPOINTMENT (OUTPATIENT)
Dept: CARDIOLOGY | Facility: CLINIC | Age: 65
End: 2025-01-07

## 2025-01-07 ENCOUNTER — NON-APPOINTMENT (OUTPATIENT)
Age: 65
End: 2025-01-07

## 2025-01-07 VITALS
SYSTOLIC BLOOD PRESSURE: 120 MMHG | HEART RATE: 65 BPM | OXYGEN SATURATION: 99 % | DIASTOLIC BLOOD PRESSURE: 84 MMHG | HEIGHT: 67 IN | RESPIRATION RATE: 16 BRPM | WEIGHT: 170 LBS | BODY MASS INDEX: 26.68 KG/M2

## 2025-01-07 DIAGNOSIS — Z95.2 PRESENCE OF PROSTHETIC HEART VALVE: ICD-10-CM

## 2025-01-07 DIAGNOSIS — I10 ESSENTIAL (PRIMARY) HYPERTENSION: ICD-10-CM

## 2025-01-07 DIAGNOSIS — I48.91 UNSPECIFIED ATRIAL FIBRILLATION: ICD-10-CM

## 2025-01-07 PROCEDURE — 99214 OFFICE O/P EST MOD 30 MIN: CPT

## 2025-01-07 PROCEDURE — G2211 COMPLEX E/M VISIT ADD ON: CPT

## 2025-01-07 RX ORDER — SPIRONOLACTONE 25 MG/1
25 TABLET ORAL DAILY
Qty: 45 | Refills: 0 | Status: ACTIVE | COMMUNITY
Start: 2025-01-07 | End: 1900-01-01

## 2025-02-05 ENCOUNTER — NON-APPOINTMENT (OUTPATIENT)
Age: 65
End: 2025-02-05

## 2025-02-05 ENCOUNTER — APPOINTMENT (OUTPATIENT)
Dept: CARDIOLOGY | Facility: CLINIC | Age: 65
End: 2025-02-05

## 2025-02-05 VITALS
HEIGHT: 67 IN | HEART RATE: 69 BPM | WEIGHT: 172 LBS | DIASTOLIC BLOOD PRESSURE: 80 MMHG | BODY MASS INDEX: 27 KG/M2 | OXYGEN SATURATION: 98 % | SYSTOLIC BLOOD PRESSURE: 134 MMHG

## 2025-02-05 DIAGNOSIS — I10 ESSENTIAL (PRIMARY) HYPERTENSION: ICD-10-CM

## 2025-02-05 DIAGNOSIS — Z98.890 OTHER SPECIFIED POSTPROCEDURAL STATES: ICD-10-CM

## 2025-02-05 DIAGNOSIS — I48.91 UNSPECIFIED ATRIAL FIBRILLATION: ICD-10-CM

## 2025-02-05 PROCEDURE — 93000 ELECTROCARDIOGRAM COMPLETE: CPT

## 2025-02-05 PROCEDURE — 99214 OFFICE O/P EST MOD 30 MIN: CPT | Mod: 25

## 2025-04-04 ENCOUNTER — RX RENEWAL (OUTPATIENT)
Age: 65
End: 2025-04-04

## 2025-05-23 ENCOUNTER — APPOINTMENT (OUTPATIENT)
Dept: MRI IMAGING | Facility: CLINIC | Age: 65
End: 2025-05-23

## 2025-06-30 ENCOUNTER — RX RENEWAL (OUTPATIENT)
Age: 65
End: 2025-06-30

## 2025-07-30 RX ORDER — TAMSULOSIN HCL 0.4 MG
CAPSULE ORAL
Refills: 0 | Status: ACTIVE | COMMUNITY

## 2025-09-05 ENCOUNTER — NON-APPOINTMENT (OUTPATIENT)
Age: 65
End: 2025-09-05

## 2025-09-05 ENCOUNTER — APPOINTMENT (OUTPATIENT)
Dept: CARDIOLOGY | Facility: CLINIC | Age: 65
End: 2025-09-05
Payer: COMMERCIAL

## 2025-09-05 VITALS
OXYGEN SATURATION: 98 % | HEART RATE: 67 BPM | SYSTOLIC BLOOD PRESSURE: 118 MMHG | RESPIRATION RATE: 21 BRPM | WEIGHT: 175 LBS | DIASTOLIC BLOOD PRESSURE: 80 MMHG | HEIGHT: 67 IN | BODY MASS INDEX: 27.47 KG/M2

## 2025-09-05 DIAGNOSIS — I47.19 OTHER SUPRAVENTRICULAR TACHYCARDIA: ICD-10-CM

## 2025-09-05 DIAGNOSIS — I51.9 HEART DISEASE, UNSPECIFIED: ICD-10-CM

## 2025-09-05 DIAGNOSIS — R00.2 PALPITATIONS: ICD-10-CM

## 2025-09-05 DIAGNOSIS — I10 ESSENTIAL (PRIMARY) HYPERTENSION: ICD-10-CM

## 2025-09-05 DIAGNOSIS — E78.5 HYPERLIPIDEMIA, UNSPECIFIED: ICD-10-CM

## 2025-09-05 DIAGNOSIS — I48.91 UNSPECIFIED ATRIAL FIBRILLATION: ICD-10-CM

## 2025-09-05 PROCEDURE — 93000 ELECTROCARDIOGRAM COMPLETE: CPT

## 2025-09-05 PROCEDURE — 99214 OFFICE O/P EST MOD 30 MIN: CPT | Mod: 25

## (undated) DEVICE — CONNECTOR "Y" 1/2 X 3/8 X 3/8"

## (undated) DEVICE — TUBING SUCTION 20FT

## (undated) DEVICE — PACK UNIVERSAL CARDIAC SUPPLEMNTAL B

## (undated) DEVICE — SOL IRR POUR H2O 250ML

## (undated) DEVICE — SAW BLADE MICROAIRE STERNUM 1X34X9.4MM

## (undated) DEVICE — DRAPE 1/2 SHEET 40X57"

## (undated) DEVICE — SUT TICRON 5 30" KV-40

## (undated) DEVICE — SOL IRR BAG NS 0.9% 3000ML

## (undated) DEVICE — SUT POLYSORB 2-0 30" GS-21 UNDYED

## (undated) DEVICE — SENSOR MYOCARDIAL TEMP 15MM

## (undated) DEVICE — PACK W INSPIRE OXYGENATOR W HEMOCONCENTRATOR

## (undated) DEVICE — SUT ETHIBOND 2-0 4-30" RB-1 GREEN

## (undated) DEVICE — SOL IRR POUR NS 0.9% 500ML

## (undated) DEVICE — STRYKER INTERPULSE HANDPIECE W IRR SUCTION TUBE

## (undated) DEVICE — BLADE SCALPEL SAFETYLOCK #11

## (undated) DEVICE — SAW BLADE STRYKER CORE FAN COARSE OFFSET 53 X 40 X 0.64MM

## (undated) DEVICE — DRSG XEROFORM 1 X 8"

## (undated) DEVICE — VENODYNE/SCD SLEEVE CALF MEDIUM

## (undated) DEVICE — SUCTION YANKAUER NO CONTROL VENT

## (undated) DEVICE — SUT ETHIBOND 2-0 36" SH

## (undated) DEVICE — CONNECTOR STRAIGHT 3/8 X 1/2"

## (undated) DEVICE — SYR LUER LOK 30CC

## (undated) DEVICE — GOWN TRIMAX XXL

## (undated) DEVICE — GOWN TRIMAX LG

## (undated) DEVICE — Device

## (undated) DEVICE — SUT PROLENE 3-0 36" SH

## (undated) DEVICE — DRAPE SLUSH MACHINE 48" X 48"

## (undated) DEVICE — TUBING ATS SUCTION LINE

## (undated) DEVICE — DRAIN CHANNEL 19FR ROUND FULL FLUTED

## (undated) DEVICE — DRAIN RESERVOIR FOR JACKSON PRATT 100CC CARDINAL

## (undated) DEVICE — TUBING IV SET MICROCLAVE ADAPTER

## (undated) DEVICE — SUMP PERICARDIAL 20FR 1/4" ADULT

## (undated) DEVICE — DRAPE IOBAN 23" X 23"

## (undated) DEVICE — URETERAL CATH RED RUBBER 22FR (WITHOUT CODE)

## (undated) DEVICE — SUT PROLENE 5-0 30" RB-2

## (undated) DEVICE — FOLEY TRAY 16FR 5CC LF LUBRISIL ADVANCE TEMP CLOSED

## (undated) DEVICE — SUT ETHIBOND 2-0 4-30" RB-1 WHITE

## (undated) DEVICE — ELCTR BOVIE TIP CLEANER SCRATCH PAD

## (undated) DEVICE — SAW BLADE STRYKER FAN OFFSET 20 X 41 X 0.38MM

## (undated) DEVICE — SUT BLUNT SZ 5

## (undated) DEVICE — SUT POLYSORB 2 30" GS-26

## (undated) DEVICE — PACING CABLE (BLUE) ATRIAL TEMP SCREW DOWN 12FT

## (undated) DEVICE — GLV 7.5 PROTEXIS (WHITE)

## (undated) DEVICE — RIGID ADULT SUCKER

## (undated) DEVICE — SAW BLADE MICROAIRE STERNUM 1.1X50X42MM

## (undated) DEVICE — DRSG STERISTRIPS 0.5 X 4"

## (undated) DEVICE — VASCULAR DILATOR KIT 8,12,16,20, 24FR

## (undated) DEVICE — SUT POLYSORB 0 36" GS-25 UNDYED

## (undated) DEVICE — ELCTR BOVIE PENCIL HANDPIECE ROCKER SWITCH 15FT

## (undated) DEVICE — DRSG MAMMARY SUPPORT XL SIZE 5

## (undated) DEVICE — TOURNIQUET SET 12FR (1 RED, 1 BLUE, 1 SNARE) 7"

## (undated) DEVICE — DRAPE SLUSH / WARMER 44 X 66"

## (undated) DEVICE — VENTING ADAPTER "Y" (RED/BLUE) 7.5"

## (undated) DEVICE — SOL NORMOSOL-R PH7.4 1000ML

## (undated) DEVICE — VESSEL LOOP EXTRA MAXI-BLUE 0.200" X 22"

## (undated) DEVICE — SUT BIOSYN 4-0 18" P-12

## (undated) DEVICE — FEEDING TUBE NG SUMP 16FR 48"

## (undated) DEVICE — SYR LUER LOK 20CC

## (undated) DEVICE — BLADE SCALPEL SAFETYLOCK #10

## (undated) DEVICE — STOPCOCK 4-WAY W SWIVEL MALE LUER LOCK NON VENTED RED CAP

## (undated) DEVICE — PAD INSULATION ADULT CARDIAC

## (undated) DEVICE — BLADE SCALPEL SAFETYLOCK #15

## (undated) DEVICE — GLV 8 PROTEXIS (WHITE)

## (undated) DEVICE — LAP PAD 18 X 18"

## (undated) DEVICE — SUT PROLENE 4-0 36" BB

## (undated) DEVICE — SAW BLADE STRYKER STERNUM 31MM X 6.27 X .79

## (undated) DEVICE — GLV 7 PROTEXIS (WHITE)

## (undated) DEVICE — CONNECTOR "Y" 1/2 X 1/2 X 3/8"

## (undated) DEVICE — SYR ASEPTO

## (undated) DEVICE — GLV 8 PROTEXIS ORTHO (CREAM)

## (undated) DEVICE — GLV 6.5 PROTEXIS (WHITE)

## (undated) DEVICE — SET PERF Y TYPE 13.5IN STRL

## (undated) DEVICE — NDL COUNTER FOAM AND MAGNET 40-70

## (undated) DEVICE — ELCTR BOVIE TIP BLADE VALLEYLAB 6.5"

## (undated) DEVICE — SUT SILK 2-0 18" SH (POP-OFF)

## (undated) DEVICE — SUT SOFSILK 0 30" V-20

## (undated) DEVICE — BEAVER BLADE MINI SHARP ALL ROUND (BLUE)

## (undated) DEVICE — MULTIPLE PERFUSION SET FEMALE 1 INLET LEG W 4 LEGS 15" (BLUE/RED)

## (undated) DEVICE — SUT SOFSILK 2 60" TIES

## (undated) DEVICE — NDL HYPO SAFE 18G X 1.5" (PINK)

## (undated) DEVICE — PHRENIC NERVE PAD MEDIUM

## (undated) DEVICE — SUT PROLENE 4-0 36" RB-1

## (undated) DEVICE — CONNECTOR STRAIGHT 3/8 X 3/8"

## (undated) DEVICE — GLV 7.5 DERMAPRENE ULTRA

## (undated) DEVICE — POSITIONER CARDIAC BUMP

## (undated) DEVICE — FILTER REINFUSION FOR SALVAGED BLOOD DISP

## (undated) DEVICE — SUT PLEDGET PRE PUNCH 4.8 X 9.5 X 1.5 MM

## (undated) DEVICE — SYR LUER LOK 1CC

## (undated) DEVICE — DRSG TEGADERM 6"X8"

## (undated) DEVICE — CONNECTOR CARDIAC 1:1 FOR HUBLESS DRAINS

## (undated) DEVICE — POSITIONER FOAM EGG CRATE ULNAR 2PCS (PINK)

## (undated) DEVICE — GLV 8.5 PROTEXIS (WHITE)

## (undated) DEVICE — SYR LUER LOK 50CC

## (undated) DEVICE — MARKING PEN W RULER

## (undated) DEVICE — DRAPE MAYO STAND 30"

## (undated) DEVICE — GOWN XXXL

## (undated) DEVICE — DRSG OPSITE 2.5 X 2"

## (undated) DEVICE — TUBING KIT FAST START ATF 40

## (undated) DEVICE — SUT STAINLESS STEEL 5 18" SCC

## (undated) DEVICE — SUT PROLENE 4-0 36" SH

## (undated) DEVICE — PACING CABLE (BROWN) A/V TEMP SCREW DOWN 12FT

## (undated) DEVICE — URETERAL CATH RED RUBBER 20FR (YELLOW)

## (undated) DEVICE — CATH IV SAFE BC 20G X 1.16" (PINK)

## (undated) DEVICE — SUT VICRYL 2-0 27" CT-1 UNDYED

## (undated) DEVICE — DRAPE TOWEL BLUE 17" X 24"

## (undated) DEVICE — PACING CABLE A/V TEMP SCREW DOWN 6FT

## (undated) DEVICE — DRSG OPSITE 13.75 X 4"

## (undated) DEVICE — TUBING INSUFFLATION LAP FILTER 10FT

## (undated) DEVICE — ELCTR BOVIE TIP BLADE INSULATED 4" EDGE

## (undated) DEVICE — DRSG DERMABOND PRINEO 60CM

## (undated) DEVICE — SUT BOOT STANDARD (YELLOW) 5 PAIR

## (undated) DEVICE — PREP CHLORAPREP HI-LITE ORANGE 26ML

## (undated) DEVICE — CHEST DRAIN PLEUR-EVAC WET/WET ADULT-PEDS SINGLE (QUICK)

## (undated) DEVICE — SUT SURGICAL STEEL 6 30" BP-1

## (undated) DEVICE — DRAIN CHANNEL 32FR ROUND HUBLESS FULL FLUTED

## (undated) DEVICE — STAPLER SKIN VISI-STAT 35 WIDE

## (undated) DEVICE — PACK UNIVERSAL CARDIAC

## (undated) DEVICE — URETERAL CATH RED RUBBER 8FR

## (undated) DEVICE — VISITEC 4X4

## (undated) DEVICE — SUT TICRON 2-0 36" CV-316 DA

## (undated) DEVICE — SUMP INTRACARDIAC 20FR 1/4" ADULT

## (undated) DEVICE — SUT DOUBLE 6 WIRE STERNAL

## (undated) DEVICE — DRAPE LIGHT HANDLE COVER (BLUE)

## (undated) DEVICE — TUBING TRUWAVE PRESSURE MALE/FEMALE 72"

## (undated) DEVICE — WARMING BLANKET DUO-THERM HYPER/HYPOTHERM ADULT

## (undated) DEVICE — ELCTR PLASMA BLADE X 3.0S WIDE TIP